# Patient Record
Sex: MALE | Race: WHITE | Employment: UNEMPLOYED | URBAN - METROPOLITAN AREA
[De-identification: names, ages, dates, MRNs, and addresses within clinical notes are randomized per-mention and may not be internally consistent; named-entity substitution may affect disease eponyms.]

---

## 2021-05-20 ENCOUNTER — OFFICE VISIT (OUTPATIENT)
Dept: FAMILY MEDICINE CLINIC | Age: 61
End: 2021-05-20
Payer: MEDICARE

## 2021-05-20 VITALS
WEIGHT: 197 LBS | BODY MASS INDEX: 28.2 KG/M2 | HEIGHT: 70 IN | TEMPERATURE: 97.3 F | SYSTOLIC BLOOD PRESSURE: 138 MMHG | DIASTOLIC BLOOD PRESSURE: 79 MMHG

## 2021-05-20 DIAGNOSIS — Z23 NEED FOR 23-POLYVALENT PNEUMOCOCCAL POLYSACCHARIDE VACCINE: ICD-10-CM

## 2021-05-20 DIAGNOSIS — M54.12 CERVICAL RADICULOPATHY: ICD-10-CM

## 2021-05-20 DIAGNOSIS — Z13.29 THYROID DISORDER SCREENING: ICD-10-CM

## 2021-05-20 DIAGNOSIS — Z13.1 DIABETES MELLITUS SCREENING: ICD-10-CM

## 2021-05-20 DIAGNOSIS — Z11.3 ROUTINE SCREENING FOR STI (SEXUALLY TRANSMITTED INFECTION): ICD-10-CM

## 2021-05-20 DIAGNOSIS — Z11.59 NEED FOR HEPATITIS C SCREENING TEST: ICD-10-CM

## 2021-05-20 DIAGNOSIS — Z13.220 LIPID SCREENING: ICD-10-CM

## 2021-05-20 DIAGNOSIS — M54.2 NECK PAIN: ICD-10-CM

## 2021-05-20 DIAGNOSIS — Z13.0 SCREENING FOR BLOOD DISEASE: ICD-10-CM

## 2021-05-20 DIAGNOSIS — Z23 NEED FOR SHINGLES VACCINE: ICD-10-CM

## 2021-05-20 DIAGNOSIS — Z13.21 ENCOUNTER FOR VITAMIN DEFICIENCY SCREENING: ICD-10-CM

## 2021-05-20 DIAGNOSIS — Z11.4 ENCOUNTER FOR SCREENING FOR HUMAN IMMUNODEFICIENCY VIRUS (HIV): ICD-10-CM

## 2021-05-20 DIAGNOSIS — Z12.11 COLON CANCER SCREENING: ICD-10-CM

## 2021-05-20 DIAGNOSIS — J43.8 OTHER EMPHYSEMA (HCC): Primary | Chronic | ICD-10-CM

## 2021-05-20 DIAGNOSIS — F17.200 CURRENT SMOKER: ICD-10-CM

## 2021-05-20 PROBLEM — M16.12 PRIMARY OSTEOARTHRITIS OF LEFT HIP: Status: ACTIVE | Noted: 2017-06-22

## 2021-05-20 PROCEDURE — 3023F SPIROM DOC REV: CPT | Performed by: FAMILY MEDICINE

## 2021-05-20 PROCEDURE — 4004F PT TOBACCO SCREEN RCVD TLK: CPT | Performed by: FAMILY MEDICINE

## 2021-05-20 PROCEDURE — 3017F COLORECTAL CA SCREEN DOC REV: CPT | Performed by: FAMILY MEDICINE

## 2021-05-20 PROCEDURE — G8419 CALC BMI OUT NRM PARAM NOF/U: HCPCS | Performed by: FAMILY MEDICINE

## 2021-05-20 PROCEDURE — G8427 DOCREV CUR MEDS BY ELIG CLIN: HCPCS | Performed by: FAMILY MEDICINE

## 2021-05-20 PROCEDURE — 90471 IMMUNIZATION ADMIN: CPT | Performed by: FAMILY MEDICINE

## 2021-05-20 PROCEDURE — 99203 OFFICE O/P NEW LOW 30 MIN: CPT | Performed by: FAMILY MEDICINE

## 2021-05-20 PROCEDURE — G8926 SPIRO NO PERF OR DOC: HCPCS | Performed by: FAMILY MEDICINE

## 2021-05-20 PROCEDURE — 90732 PPSV23 VACC 2 YRS+ SUBQ/IM: CPT | Performed by: FAMILY MEDICINE

## 2021-05-20 RX ORDER — ASPIRIN 81 MG/1
81 TABLET ORAL DAILY
COMMUNITY

## 2021-05-20 RX ORDER — LORATADINE 10 MG/1
TABLET ORAL
COMMUNITY
Start: 2021-05-05 | End: 2021-06-30 | Stop reason: ALTCHOICE

## 2021-05-20 RX ORDER — ALBUTEROL SULFATE 90 UG/1
2 AEROSOL, METERED RESPIRATORY (INHALATION) 4 TIMES DAILY PRN
Qty: 1 INHALER | Refills: 0 | Status: SHIPPED | OUTPATIENT
Start: 2021-05-20 | End: 2021-12-20

## 2021-05-20 ASSESSMENT — ENCOUNTER SYMPTOMS
RESPIRATORY NEGATIVE: 1
GASTROINTESTINAL NEGATIVE: 1
EYES NEGATIVE: 1

## 2021-05-20 ASSESSMENT — PATIENT HEALTH QUESTIONNAIRE - PHQ9
SUM OF ALL RESPONSES TO PHQ QUESTIONS 1-9: 1
2. FEELING DOWN, DEPRESSED OR HOPELESS: 1
SUM OF ALL RESPONSES TO PHQ QUESTIONS 1-9: 1
SUM OF ALL RESPONSES TO PHQ QUESTIONS 1-9: 1

## 2021-05-20 NOTE — PROGRESS NOTES
[address] PRIMARY CARE  [address] [place] [address] [facility]  Dept: [phone]  Dept Fax: [phone]    Caro Nazario is a 64 y.o. male who is a New patient, who presents today for his medical conditions/complaints as noted below:  Chief Complaint   Patient presents with   [facility]     ct of lungs    Neck Pain     slept on it wrong back in september and still not healed properly    Other     wants to check skin on hand for melona     Other     allergy test     Other     pneumonia vaccine    Other     colonoscopy in 2015 [facility]          HPI:     The patient is a 21-year-old male with a past medical history significant for osteoarthritis who presents today to [facility]. Patient provides a history for himself. The patient does note multiple complaints today including neck pain, an episode of heartburn and dizziness, palpitations. About 1 month ago the patient had not eaten all day, around 11 PM the patient had dranken 6 beers about 2 hours prior. The patient was making pizza and drinking still. The patient felt like his legs were shutting down standing there. He went to go to his bed and sat on the edge. He fell forward onto his face and nose. About 1 hour later while he was in bed he had significant heartburn (he had been eating pepper and garlic salt while preparing the pizza). The patient developed sweating while he was laying in bed as well. This has not reoccurred. He has drank an occasional beer since that time and did not have the same thing occur. The patient has occasional heart palpitations. He relates this to caffeine a lot. He drinks 12 cups of coffee/day. The patient had symptoms similar to Sandi in 10/2019. He had a headache, hoarseness. The patient does complain of neck pain that radiates down to the left hand. This began after he slept on it and woke up with pain.   This has been going on for at least a month likely a couple months. The symptoms have not been improving much. He has tried over-the-counter medications with no relief in the symptoms. The patient has a history of emphysema. He is a smoker. Neck Pain   This is a chronic problem. The current episode started more than 1 month ago. The problem occurs intermittently. The problem has been waxing and waning. The pain is associated with a sleep position. The pain is present in the left side and midline. The quality of the pain is described as aching. The pain is mild. Associated symptoms include headaches and numbness. Pertinent negatives include no chest pain, fever, leg pain, pain with swallowing, paresis, photophobia, syncope, tingling, trouble swallowing, visual change, weakness or weight loss. He has tried acetaminophen, bed rest, ice and NSAIDs for the symptoms. The treatment provided no relief. No results found for: LABA1C          ( goal A1Cis < 7)   No results found for: LABMICR  LDL Cholesterol (mg/dL)   Date Value   05/21/2021 148 (H)       (goal LDL is <100)   AST (U/L)   Date Value   05/21/2021 21     ALT (U/L)   Date Value   05/21/2021 23     BUN (mg/dL)   Date Value   05/21/2021 12     BP Readings from Last 3 Encounters:   05/20/21 138/79          (goal 120/80)    Past Medical History:   Diagnosis Date    Primary osteoarthritis of both hips     Seasonal allergies       History reviewed. No pertinent surgical history.     Family History   Problem Relation Age of Onset    Coronary Art Dis Mother     Other Mother         ulcers    Dementia Mother     Heart Attack Father     Diabetes Brother     Colon Cancer Neg Hx     Breast Cancer Neg Hx     Prostate Cancer Neg Hx        Social History     Tobacco Use    Smoking status: Current Every Day Smoker     Packs/day: 1.00     Years: 44.00     Pack years: 44.00     Types: Cigarettes     Start date: 1/1/1976    Smokeless tobacco: Never Used   Substance Use Topics    Alcohol use: Not diaphoretic. HENT:      Head: Normocephalic and atraumatic. Right Ear: Tympanic membrane, ear canal and external ear normal.      Left Ear: Tympanic membrane, ear canal and external ear normal.      Nose: Nose normal.      Mouth/Throat:      Mouth: Mucous membranes are moist.      Dentition: Abnormal dentition. Dental caries present. Eyes:      Extraocular Movements: Extraocular movements intact. Conjunctiva/sclera: Conjunctivae normal.      Pupils: Pupils are equal, round, and reactive to light. Cardiovascular:      Rate and Rhythm: Normal rate and regular rhythm. Pulses: Normal pulses. Heart sounds: Normal heart sounds. No murmur heard. Pulmonary:      Effort: Pulmonary effort is normal.      Breath sounds: Normal breath sounds. Abdominal:      General: Abdomen is flat. Bowel sounds are normal.      Palpations: Abdomen is soft. Musculoskeletal:         General: Normal range of motion. Cervical back: Normal range of motion and neck supple. No swelling, deformity or tenderness. Pain with movement present. Normal range of motion. Skin:     Capillary Refill: Capillary refill takes less than 2 seconds. Neurological:      General: No focal deficit present. Mental Status: He is alert and oriented to person, place, and time. Cranial Nerves: No cranial nerve deficit. Psychiatric:         Attention and Perception: Attention normal.         Mood and Affect: Mood and affect normal.         Speech: Speech normal.         Behavior: Behavior normal.         Thought Content: Thought content normal.         Judgment: Judgment normal.       /79 (Site: Right Upper Arm, Position: Sitting, Cuff Size: Large Adult)   Temp 97.3 °F (36.3 °C) (Temporal)   Ht 5' 9.5\" (1.765 m)   Wt 197 lb (89.4 kg)   BMI 28.67 kg/m²     Assessment:       Diagnosis Orders   1. Other emphysema (HCC)  albuterol sulfate HFA (VENTOLIN HFA) 108 (90 Base) MCG/ACT inhaler   2.  Cervical radiculopathy [facility]   3. Current smoker  CT LUNG SCREENING    albuterol sulfate HFA (VENTOLIN HFA) 108 (90 Base) MCG/ACT inhaler   4. Neck pain  XR CERVICAL SPINE (4-5 VIEWS)   5. Encounter for screening for human immunodeficiency virus (HIV)  HIV Screen   6. Need for hepatitis C screening test  Hepatitis C Antibody   7. Lipid screening  Lipid, Fasting   8. Diabetes mellitus screening  Comprehensive Metabolic Panel, Fasting   9. Thyroid disorder screening  TSH With Reflex Ft4   10. Encounter for vitamin deficiency screening  Vitamin D 25 Hydroxy   11. Screening for blood disease  CBC Auto Differential   12. Routine screening for STI (sexually transmitted infection)  Chlamydia/GC DNA, Urine    T. Pallidum Ab   13. Need for shingles vaccine  zoster recombinant adjuvanted vaccine (SHINGRIX) 50 MCG/0.5ML SUSR injection   14. Need for 23-polyvalent pneumococcal polysaccharide vaccine  PNEUMOVAX 23 subcutaneous/IM (Pneumococcal polysaccharide vaccine 23-valent >= 1yo)   15. Colon cancer screening  [facility] Screening Colonoscopy             Plan:    Emphysemapatient to use albuterol as needed. Spirometry is needed. Will order CT lung screening as well. Patient does note that he had spirometry done at [facility] a few years back, we will attempt to retrieve this. Pending the results of the spirometry we may need to add maintenance inhalers. Discussed with patient importance of smoking cessation. Cervical radiculopathy/neck painpatient to pursue physical therapy, take over-the-counter medications such as Tylenol ibuprofen and get imaging of the neck. If symptoms persist past 4 weeks to 6 weeks we will pursue MRI of the neck to see if there is any nerve impingement or disc disease. Patient encouraged to notify us in 4 to 6 weeks his progress. Current smokerdiscussed with patient importance of smoking cessation. At this time he is not able to quit smoking due to stress.   We will update his Pneumovax, check a CT lung screening and continue with education at each visit. Of note the patient had multiple sexual encounters and states he had around 147 partners. Due to this I would like to have the patient get STI screening including syphilis gonorrhea chlamydia and hepatitis. Return in about 4 weeks (around 6/17/2021) for follow up on testing. Orders Placed This Encounter   Procedures    Chlamydia/GC DNA, Urine     Standing Status:   Future     Standing Expiration Date:   5/20/2022    CT LUNG SCREENING     Standing Status:   Future     Standing Expiration Date:   8/20/2021     Order Specific Question:   Is there documentation of shared decision making? Answer:   Yes     Order Specific Question:   Does the patient show any signs or symptoms of lung cancer? Answer:   No     Order Specific Question:   Is this the first (baseline) CT or an annual exam?     Answer:   Baseline [1]     Order Specific Question:   Is this a low dose CT or a routine CT? Answer:   Low Dose CT [1]     Order Specific Question:   Smoking Status? Answer:   Current Every Day Smoker [1]     Order Specific Question:   Smoking packs per day? Answer:   1     Order Specific Question:   Years smoking?      Answer:   44    XR CERVICAL SPINE (4-5 VIEWS)     Standing Status:   Future     Standing Expiration Date:   5/20/2022     Order Specific Question:   Reason for exam:     Answer:   neck pain    PNEUMOVAX 23 subcutaneous/IM (Pneumococcal polysaccharide vaccine 23-valent >= 1yo)    Hepatitis C Antibody     Standing Status:   Future     Number of Occurrences:   1     Standing Expiration Date:   5/19/2022    HIV Screen     Standing Status:   Future     Number of Occurrences:   1     Standing Expiration Date:   5/19/2022    Lipid, Fasting     Standing Status:   Future     Number of Occurrences:   1     Standing Expiration Date:   5/20/2022    Comprehensive Metabolic Panel, Fasting     Standing Status:   Future     Number of Occurrences:   1     Standing Expiration Date:   5/20/2022    TSH With Reflex Ft4     Standing Status:   Future     Number of Occurrences:   1     Standing Expiration Date:   5/20/2022    Vitamin D 25 Hydroxy     Standing Status:   Future     Number of Occurrences:   1     Standing Expiration Date:   5/20/2022    CBC Auto Differential     Standing Status:   Future     Number of Occurrences:   1     Standing Expiration Date:   5/20/2022    T. Pallidum Ab     Standing Status:   Future     Number of Occurrences:   1     Standing Expiration Date:   5/20/2022   [facility] Screening Colonoscopy     Referral Priority:   Routine     Referral Type: Other     Referral Reason:   Specialty Services Required     Number of Visits Requested:   [facility]     Referral Priority:   Routine     Referral Type:   Eval and Treat     Referral Reason:   Specialty Services Required     Requested Specialty:   Physical Therapy     Number of Visits Requested:   1     Orders Placed This Encounter   Medications    zoster recombinant adjuvanted vaccine (SHINGRIX) 50 MCG/0.5ML SUSR injection     Sig: Inject 0.5 mLs into the muscle once for 1 dose     Dispense:  0.5 mL     Refill:  0    albuterol sulfate HFA (VENTOLIN HFA) 108 (90 Base) MCG/ACT inhaler     Sig: Inhale 2 puffs into the lungs 4 times daily as needed for Wheezing     Dispense:  1 Inhaler     Refill:  0       Patient given educational materials - see patient instructions. Discussed use, benefit, and side effects of prescribed medications. All patientquestions answered. Pt voiced understanding. Reviewed health maintenance. Instructedto continue current medications, diet and exercise. Patient agreed with treatmentplan. Follow up as directed.      Electronically signed by Antonio Deluca MD on 5/24/2021 at 7:08 AM

## 2021-05-21 ENCOUNTER — HOSPITAL ENCOUNTER (OUTPATIENT)
Age: 61
Setting detail: SPECIMEN
Discharge: HOME | End: 2021-05-21
Payer: MEDICARE

## 2021-05-21 DIAGNOSIS — Z11.59 NEED FOR HEPATITIS C SCREENING TEST: ICD-10-CM

## 2021-05-21 DIAGNOSIS — Z13.0 SCREENING FOR BLOOD DISEASE: ICD-10-CM

## 2021-05-21 DIAGNOSIS — Z13.29 THYROID DISORDER SCREENING: ICD-10-CM

## 2021-05-21 DIAGNOSIS — Z13.21 ENCOUNTER FOR VITAMIN DEFICIENCY SCREENING: ICD-10-CM

## 2021-05-21 DIAGNOSIS — Z11.3 ROUTINE SCREENING FOR STI (SEXUALLY TRANSMITTED INFECTION): ICD-10-CM

## 2021-05-21 DIAGNOSIS — Z13.1 DIABETES MELLITUS SCREENING: ICD-10-CM

## 2021-05-21 DIAGNOSIS — Z11.4 ENCOUNTER FOR SCREENING FOR HUMAN IMMUNODEFICIENCY VIRUS (HIV): ICD-10-CM

## 2021-05-21 DIAGNOSIS — Z13.220 LIPID SCREENING: ICD-10-CM

## 2021-05-21 LAB
ABSOLUTE EOS #: 0.39 K/UL (ref 0–0.44)
ABSOLUTE IMMATURE GRANULOCYTE: <0.03 K/UL (ref 0–0.3)
ABSOLUTE LYMPH #: 2.86 K/UL (ref 1.1–3.7)
ABSOLUTE MONO #: 0.68 K/UL (ref 0.1–1.2)
ALBUMIN SERPL-MCNC: 4.5 G/DL (ref 3.5–5.2)
ALBUMIN/GLOBULIN RATIO: 1.7 (ref 1–2.5)
ALP BLD-CCNC: 61 U/L (ref 40–129)
ALT SERPL-CCNC: 23 U/L (ref 5–41)
ANION GAP SERPL CALCULATED.3IONS-SCNC: 11 MMOL/L (ref 9–17)
AST SERPL-CCNC: 21 U/L
BASOPHILS # BLD: 1 % (ref 0–2)
BASOPHILS ABSOLUTE: 0.06 K/UL (ref 0–0.2)
BILIRUB SERPL-MCNC: 0.42 MG/DL (ref 0.3–1.2)
BUN BLDV-MCNC: 12 MG/DL (ref 6–20)
BUN/CREAT BLD: ABNORMAL (ref 9–20)
CALCIUM SERPL-MCNC: 9.3 MG/DL (ref 8.6–10.4)
CHLORIDE BLD-SCNC: 103 MMOL/L (ref 98–107)
CHOLESTEROL, FASTING: 221 MG/DL
CHOLESTEROL/HDL RATIO: 5.3
CO2: 24 MMOL/L (ref 20–31)
CREAT SERPL-MCNC: 0.9 MG/DL (ref 0.7–1.2)
DIFFERENTIAL TYPE: ABNORMAL
EOSINOPHILS RELATIVE PERCENT: 4 % (ref 1–4)
GFR AFRICAN AMERICAN: >60 ML/MIN
GFR NON-AFRICAN AMERICAN: >60 ML/MIN
GFR SERPL CREATININE-BSD FRML MDRD: ABNORMAL ML/MIN/{1.73_M2}
GFR SERPL CREATININE-BSD FRML MDRD: ABNORMAL ML/MIN/{1.73_M2}
GLUCOSE FASTING: 135 MG/DL (ref 70–99)
HCT VFR BLD CALC: 53 % (ref 40.7–50.3)
HDLC SERPL-MCNC: 42 MG/DL
HEMOGLOBIN: 16.3 G/DL (ref 13–17)
HEPATITIS C ANTIBODY: REACTIVE
HIV AG/AB: NONREACTIVE
IMMATURE GRANULOCYTES: 0 %
LDL CHOLESTEROL: 148 MG/DL (ref 0–130)
LYMPHOCYTES # BLD: 29 % (ref 24–43)
MCH RBC QN AUTO: 28.6 PG (ref 25.2–33.5)
MCHC RBC AUTO-ENTMCNC: 30.8 G/DL (ref 28.4–34.8)
MCV RBC AUTO: 93 FL (ref 82.6–102.9)
MONOCYTES # BLD: 7 % (ref 3–12)
NRBC AUTOMATED: 0 PER 100 WBC
PDW BLD-RTO: 14.6 % (ref 11.8–14.4)
PLATELET # BLD: 327 K/UL (ref 138–453)
PLATELET ESTIMATE: ABNORMAL
PMV BLD AUTO: 10.8 FL (ref 8.1–13.5)
POTASSIUM SERPL-SCNC: 4.1 MMOL/L (ref 3.7–5.3)
RBC # BLD: 5.7 M/UL (ref 4.21–5.77)
RBC # BLD: ABNORMAL 10*6/UL
SEG NEUTROPHILS: 59 % (ref 36–65)
SEGMENTED NEUTROPHILS ABSOLUTE COUNT: 5.81 K/UL (ref 1.5–8.1)
SODIUM BLD-SCNC: 138 MMOL/L (ref 135–144)
T. PALLIDUM, IGG: NONREACTIVE
TOTAL PROTEIN: 7.2 G/DL (ref 6.4–8.3)
TRIGLYCERIDE, FASTING: 156 MG/DL
TSH SERPL DL<=0.05 MIU/L-ACNC: 1.04 MIU/L (ref 0.3–5)
VITAMIN D 25-HYDROXY: 19.3 NG/ML (ref 30–100)
VLDLC SERPL CALC-MCNC: ABNORMAL MG/DL (ref 1–30)
WBC # BLD: 9.8 K/UL (ref 3.5–11.3)
WBC # BLD: ABNORMAL 10*3/UL

## 2021-05-24 ENCOUNTER — HOSPITAL ENCOUNTER (OUTPATIENT)
Dept: GENERAL RADIOLOGY | Facility: CLINIC | Age: 61
Discharge: HOME | End: 2021-05-26
Payer: MEDICARE

## 2021-05-24 ENCOUNTER — HOSPITAL ENCOUNTER (OUTPATIENT)
Facility: CLINIC | Age: 61
Discharge: HOME | End: 2021-05-26
Payer: MEDICARE

## 2021-05-24 ENCOUNTER — HOSPITAL ENCOUNTER (OUTPATIENT)
Age: 61
Setting detail: SPECIMEN
Discharge: HOME | End: 2021-05-24
Payer: MEDICARE

## 2021-05-24 DIAGNOSIS — Z11.3 ROUTINE SCREENING FOR STI (SEXUALLY TRANSMITTED INFECTION): ICD-10-CM

## 2021-05-24 DIAGNOSIS — M54.2 NECK PAIN: ICD-10-CM

## 2021-05-24 PROCEDURE — 72050 X-RAY EXAM NECK SPINE 4/5VWS: CPT

## 2021-05-24 ASSESSMENT — ENCOUNTER SYMPTOMS
PHOTOPHOBIA: 0
TROUBLE SWALLOWING: 0
VISUAL CHANGE: 0

## 2021-05-25 LAB
C. TRACHOMATIS DNA ,URINE: NEGATIVE
N. GONORRHOEAE DNA, URINE: NEGATIVE
SPECIMEN DESCRIPTION: NORMAL

## 2021-05-26 ENCOUNTER — OFFICE VISIT (OUTPATIENT)
Dept: FAMILY MEDICINE CLINIC | Age: 61
End: 2021-05-26
Payer: MEDICARE

## 2021-05-26 ENCOUNTER — TELEPHONE (OUTPATIENT)
Dept: ONCOLOGY | Age: 61
End: 2021-05-26

## 2021-05-26 ENCOUNTER — HOSPITAL ENCOUNTER (OUTPATIENT)
Age: 61
Discharge: HOME | End: 2021-05-26
Payer: MEDICARE

## 2021-05-26 VITALS
DIASTOLIC BLOOD PRESSURE: 82 MMHG | BODY MASS INDEX: 29.33 KG/M2 | WEIGHT: 198 LBS | SYSTOLIC BLOOD PRESSURE: 130 MMHG | OXYGEN SATURATION: 96 % | TEMPERATURE: 97.3 F | HEIGHT: 69 IN | HEART RATE: 92 BPM

## 2021-05-26 DIAGNOSIS — R73.9 HYPERGLYCEMIA: ICD-10-CM

## 2021-05-26 DIAGNOSIS — R76.8 HEPATITIS C ANTIBODY POSITIVE IN BLOOD: Primary | ICD-10-CM

## 2021-05-26 DIAGNOSIS — R76.8 HEPATITIS C ANTIBODY POSITIVE IN BLOOD: ICD-10-CM

## 2021-05-26 DIAGNOSIS — E78.00 ELEVATED LDL CHOLESTEROL LEVEL: ICD-10-CM

## 2021-05-26 DIAGNOSIS — E55.9 VITAMIN D DEFICIENCY: ICD-10-CM

## 2021-05-26 LAB
HAV IGM SER IA-ACNC: NONREACTIVE
HEPATITIS B CORE IGM ANTIBODY: NONREACTIVE
HEPATITIS B SURFACE ANTIGEN: NONREACTIVE
HEPATITIS C ANTIBODY: REACTIVE

## 2021-05-26 PROCEDURE — G8427 DOCREV CUR MEDS BY ELIG CLIN: HCPCS | Performed by: FAMILY MEDICINE

## 2021-05-26 PROCEDURE — 99214 OFFICE O/P EST MOD 30 MIN: CPT | Performed by: FAMILY MEDICINE

## 2021-05-26 PROCEDURE — G8419 CALC BMI OUT NRM PARAM NOF/U: HCPCS | Performed by: FAMILY MEDICINE

## 2021-05-26 PROCEDURE — 83036 HEMOGLOBIN GLYCOSYLATED A1C: CPT

## 2021-05-26 PROCEDURE — 36415 COLL VENOUS BLD VENIPUNCTURE: CPT

## 2021-05-26 PROCEDURE — 80074 ACUTE HEPATITIS PANEL: CPT

## 2021-05-26 PROCEDURE — 4004F PT TOBACCO SCREEN RCVD TLK: CPT | Performed by: FAMILY MEDICINE

## 2021-05-26 PROCEDURE — 3017F COLORECTAL CA SCREEN DOC REV: CPT | Performed by: FAMILY MEDICINE

## 2021-05-26 PROCEDURE — 87522 HEPATITIS C REVRS TRNSCRPJ: CPT

## 2021-05-26 RX ORDER — ATORVASTATIN CALCIUM 20 MG/1
20 TABLET, FILM COATED ORAL DAILY
Qty: 30 TABLET | Refills: 5 | Status: SHIPPED | OUTPATIENT
Start: 2021-05-26 | End: 2021-11-17

## 2021-05-26 RX ORDER — ERGOCALCIFEROL 1.25 MG/1
50000 CAPSULE ORAL WEEKLY
Qty: 12 CAPSULE | Refills: 1 | Status: SHIPPED | OUTPATIENT
Start: 2021-05-26 | End: 2021-11-17

## 2021-05-26 SDOH — ECONOMIC STABILITY: FOOD INSECURITY: WITHIN THE PAST 12 MONTHS, YOU WORRIED THAT YOUR FOOD WOULD RUN OUT BEFORE YOU GOT MONEY TO BUY MORE.: NEVER TRUE

## 2021-05-26 SDOH — ECONOMIC STABILITY: FOOD INSECURITY: WITHIN THE PAST 12 MONTHS, THE FOOD YOU BOUGHT JUST DIDN'T LAST AND YOU DIDN'T HAVE MONEY TO GET MORE.: NEVER TRUE

## 2021-05-26 ASSESSMENT — SOCIAL DETERMINANTS OF HEALTH (SDOH): HOW HARD IS IT FOR YOU TO PAY FOR THE VERY BASICS LIKE FOOD, HOUSING, MEDICAL CARE, AND HEATING?: NOT HARD AT ALL

## 2021-05-26 NOTE — PROGRESS NOTES
Age of Onset    Coronary Art Dis Mother     Other Mother         ulcers    Dementia Mother     Heart Attack Father     Diabetes Brother     Colon Cancer Neg Hx     Breast Cancer Neg Hx     Prostate Cancer Neg Hx        Social History     Tobacco Use    Smoking status: Current Every Day Smoker     Packs/day: 1.00     Years: 44.00     Pack years: 44.00     Types: Cigarettes     Start date: 1/1/1976    Smokeless tobacco: Never Used   Substance Use Topics    Alcohol use: Not Currently     Comment: occassionly only drinnks every few months      Current Outpatient Medications   Medication Sig Dispense Refill    vitamin D (ERGOCALCIFEROL) 1.25 MG (36071 UT) CAPS capsule Take 1 capsule by mouth once a week 12 capsule 1    atorvastatin (LIPITOR) 20 MG tablet Take 1 tablet by mouth daily 30 tablet 5    loratadine (CLARITIN) 10 MG tablet       aspirin 81 MG EC tablet Take 81 mg by mouth daily      albuterol sulfate HFA (VENTOLIN HFA) 108 (90 Base) MCG/ACT inhaler Inhale 2 puffs into the lungs 4 times daily as needed for Wheezing 1 Inhaler 0     No current facility-administered medications for this visit. No Known Allergies    Health Maintenance   Topic Date Due    COVID-19 Vaccine (1) Never done    Shingles Vaccine (1 of 2) Never done    Colon cancer screen colonoscopy  Never done    Low dose CT lung screening  Never done    Flu vaccine (Season Ended) 09/01/2021    Lipid screen  05/21/2022    A1C test (Diabetic or Prediabetic)  05/26/2022    DTaP/Tdap/Td vaccine (2 - Td) 01/01/2026    Pneumococcal 0-64 years Vaccine (2 of 2) 06/09/2027    Hepatitis C screen  Completed    HIV screen  Completed    Hepatitis A vaccine  Aged Out    Hepatitis B vaccine  Aged Out    Hib vaccine  Aged Out    Meningococcal (ACWY) vaccine  Aged Out       Subjective:     Review of Systems   Constitutional: Negative. HENT: Negative. Eyes: Negative. Respiratory: Negative. Cardiovascular: Negative. GI.  LFTs were essentially unremarkable and check this time. If confirmatory test is positive we will order an ultrasound of the liver. Vitamin D deficiencypatient to start vitamin D supplement and recheck vitamin D level in 6 months. Hyperglycemiapatient to get an A1c when he goes to get the other labs drawn. LDL elevationpatient's risks are high including his history of smoking, and LDL level. I discussed with him starting statin. We will start him on a statin today and recheck LFTs in 3 to 6 months. We will also recheck cholesterol in 3 months. Return in about 3 months (around 8/26/2021). Orders Placed This Encounter   Procedures    Hemoglobin A1C     Standing Status:   Future     Number of Occurrences:   1     Standing Expiration Date:   5/26/2022    Hepatitis C RNA, PCR     Standing Status:   Future     Number of Occurrences:   1     Standing Expiration Date:   5/26/2022    Hepatitis Panel, Acute     Standing Status:   Future     Number of Occurrences:   1     Standing Expiration Date:   5/26/2022   [facility], [address]     Referral Priority:   Routine     Referral Type:   Eval and Treat     Referral Reason:   Specialty Services Required     Requested Specialty:   Gastroenterology     Number of Visits Requested:   1     Orders Placed This Encounter   Medications    vitamin D (ERGOCALCIFEROL) 1.25 MG (47171 UT) CAPS capsule     Sig: Take 1 capsule by mouth once a week     Dispense:  12 capsule     Refill:  1    atorvastatin (LIPITOR) 20 MG tablet     Sig: Take 1 tablet by mouth daily     Dispense:  30 tablet     Refill:  5       Patient given educational materials - see patient instructions. Discussed use, benefit, and side effects of prescribed medications. All patientquestions answered. Pt voiced understanding. Reviewed health maintenance. Instructedto continue current medications, diet and exercise. Patient agreed with treatmentplan.  Follow up as directed.      Electronically signed by Evita Galvan MD on 5/31/2021 at 12:04 PM

## 2021-05-27 ENCOUNTER — HOSPITAL ENCOUNTER (OUTPATIENT)
Dept: PHYSICAL THERAPY | Facility: CLINIC | Age: 61
Setting detail: THERAPIES SERIES
Discharge: HOME | End: 2021-05-27
Payer: MEDICARE

## 2021-05-27 DIAGNOSIS — R73.03 PREDIABETES: Primary | ICD-10-CM

## 2021-05-27 LAB
DIRECT EXAM: NORMAL
ESTIMATED AVERAGE GLUCOSE: 134 MG/DL
HBA1C MFR BLD: 6.3 % (ref 4–6)
Lab: NORMAL
SPECIMEN DESCRIPTION: NORMAL

## 2021-05-27 PROCEDURE — 97530 THERAPEUTIC ACTIVITIES: CPT

## 2021-05-27 PROCEDURE — 97161 PT EVAL LOW COMPLEX 20 MIN: CPT

## 2021-05-27 PROCEDURE — 97140 MANUAL THERAPY 1/> REGIONS: CPT

## 2021-05-27 NOTE — CONSULTS
[] [facility]) - [facility] &  Therapy  [address]  [phone]  F: [phone] [] [address]  [facility]   [address]  P: [phone]  F: [phone] [] [address] &  Therapy  [address]  P: [phone]  F: [phone] [x] [address]  P: [phone]  F: [phone] [] [address]  [facility]   [address]   [place]: [phone]  F: [phone]      Physical Therapy Spine Evaluation    Date:  2021  Patient: Gerhardt Auer  : 1960  MRN: [id number]  Physician: Dr. Keyona Weber: liudmila fuller (30 visits/yr)  Medical Diagnosis: Cervical radiculopathy  Rehab Codes: M54.2  Onset Date:   Next 's appt.:     Subjective:   CC:  Pins/needles (low grade)/  Burn that comes and goes. HPI: Occasional numbness . Decayed vertebrae which is aggravated by \"drinking beer  And sleeping wrong\". To have an MRI in 4-6 wks if no improvement. To have a lung CT but is not having any issues. Always pain in the neck and numbness on L side of neck. But frequency, duration and severity have been decreasing. Back in Sept of last year, he slept wrong and has taken several months to improve. When it happened, he was limited with all movement. 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 Saw Dr. Edgard Nieves for the first time last week.    Prior to that, it was ~3 yrs ago having a PCP  PMHx: [] Unremarkable [] Diabetes [x] HTN  [] Pacemaker   [] MI/Heart Problems [] Cancer [] Arthritis  [] Other:              [x] Refer to full medical chart  In EPIC       Comorbidities:   [] Obesity [] Dialysis  [] N/A   [x] Asthma/COPD [] Dementia [] Other:   [] Stroke [] Sleep apnea [] Independent  [] Assist      Gait Prior level of function Current level of function    [] Independent  [] Assist [] Independent  [] Assist   Device: [] Independent [] Independent    [] Straight Cane [] Quad cane [] Straight Cane [] Quad cane    [] Standard walker [] Rolling walker   [] 4 wheeled walker [] Standard walker [] Rolling walker   [] 4 wheeled walker    [] Wheelchair [] Wheelchair     Pain:  [x] Yes  [] No Location:  Pain Rating: (0-10 scale) 0/10  Pain altered Tx:  [] Yes  [] No  Action:    Symptoms:  [x] Improving [] Worsening [] Same  Better:  [] AM    [] PM    [] Sit    [] Rise/Sit    []Stand    [] Walk    [] Lying    [] Other:  Worse: [] AM    [] PM    [] Sit    [] Rise/Sit    []Stand    [] Walk    [] Lying    [] Bend                      [] Valsalva    [] Other:  Sleep: [] OK    [x] Disturbed side sleeper. 1 pillow but he feels that there is quite a pressure on his neck. He is trying to sleep on his back     Objective:      STRENGTH  ROM    Left Right Cervical    C5 Shld Abd 5 5 Flexion wnl   Shld Flexion 5 5 Extension discomfort   Shld IR 5 5 Rotation L stif R stiff   Shld ER 5 5 Sidebend Lstiff R stiff   C6 Elb Flex 5 5 Retraction    C7 Elb Ext 5 5     C8 EPL 5 5     T1 Fing Abd 5 5        UE wnl wnl                                                           Otto shoulder flexion were wnl for full flexion and abd, but increased resistance to full range. TESTS (+/-) LEFT RIGHT Not Tested   SLR [] sit [] supine   []   Hamstring (SLR)   []   SKTC   []   DKTC   []   Slump/Dural   []   SI JT   []   ANDREA   []   Joint Mobility   []   Cerv. Comp   []   Cerv. Distraction   []   Cerv. Alar/Transverse   []   Vertebral Artery   []   Adsons   []   Ardath Patricia   []   Reji Tests ? Pain ?  Pain No Change Not Tested   RFIS [] [] [] []   JUDY [] [] [] []   RFIL [] [] [] []   REIL [] [] [] []   Rep Prot [] [] [] []   Rep Retract [] [] [] []       OBSERVATION No Deficit Deficit Not Tested Comments   Posture Forward Head [] [x] []    Rounded Shoulders [] [x] []    Kyphosis [] [x] []    Lordosis [] [] []    Lateral Shift [] [] []    Scoliosis [] [] []    Iliac Crest [] [] []    PSIS [] [] []    ASIS [] [] []    Genu Valgus [] [] []    Genu Varus [] [] []    Genu Recurvatum [] [] []    Pronation [] [] []    Supination [] [] []    Leg Length Discrp [] [] []    Slumped Sitting [] [] []    Palpation [] [] []    Sensation [] [] []    Edema [] [] []    Neurological [] [] []        Functional Test: NDI Score: not completed  functionally impaired     Comments:    Assessment:  Patient would benefit from skilled physical therapy services in order to: decrease pins/needles feeling on the L lateral side of his neck    Problems:    [x] ? Pain:  [x] ? ROM:  [] ? Strength:  [x] ? Function:  [] Other:      STG: (to be met in 6 treatments)  1. ? Pain: in neck  2. ? ROM: to minimal restriction with SB and rot  3. ? Function: able to sleep on his L side with decreased discomfort. 4. Patient to be independent with home exercise program as demonstrated by performance with correct form without cues. LTG: (to be met in 12 treatments)  1. Minimal to no paresthesias in L lateral neck  2. Independent with proper computer workstation setup      Patient goals:     Rehab Potential:  [x] Good  [] Fair  [] Poor   Suggested Professional Referral:  [x] No  [] Yes:  Barriers to Goal Achievement:  [x] No  [] Yes:  Domestic Concerns:  [x] No  [] Yes:    Pt. Education:  [x] Plans/Goals, Risks/Benefits discussed  [x] Home exercise program: sleeping posture on his side and supine with a towel roll in pillow case  Method of Education: [x] Verbal  [] Demo  [] Written  Comprehension of Education:  [x] Verbalizes understanding. [] Demonstrates understanding. [] Needs Review. [] Demonstrates/verbalizes understanding of HEP/Ed previously given.     Treatment Plan:  [x] Therapeutic Exercise   13144    [] Therapeutic Activity  87848   [] Gait Training   35394 [x] Neuromuscular Re-education  [id number]   [x] Manual Therapy  39328   [x] Instruction in HEP        Frequency:  2 x/week for 12 visits    Todays Treatment:  Modalities: none  Precautions: standard  Exercises:  Exercise Reps/ Time Weight/ Level Issued for HEP Completed Comments   UBE *               Shoulder flex/abd 10 ea  x x    Shrugs *       UT stretch *       Lev scap stretch (*       C AROM *       TB rows/ext *                                                               Other:  Manual  1. STM to L scalene, lev scap    Therapeutic act:  1. Reviewed at length proper sleeping posture with 1-2 pillows and a towel roll in the pillow case. 2. Reviewed computer workstation posture. Specific Instructions for next treatment:  1. Continue with manual  2. Add UE ex as above      Evaluation Complexity:  History (Personal factors, comorbidities) [x] 0 [] 1-2 [] 3+   Exam (limitations, restrictions) [x] 1-2 [] 3 [] 4+   Clinical presentation (progression) [x] Stable [] Evolving  [] Unstable   Decision Making [x] Low [] Moderate [] High    [x] Low Complexity [] Moderate Complexity [] High Complexity       Treatment Charges: Mins Units   [x] Evaluation       [x]  Low       []  Moderate       []  High  1   []  Modalities     []  Ther Exercise     [x]  Manual Therapy 10 1   [x]  Ther Activities 20 1   []  Aquatics     []  Vasocompression     []  Other       TOTAL TREATMENT TIME: 55    Time in: 0563      Time out: [address]    Electronically signed by: Giovani Mir PT        Physician Signature:________________________________Date:__________________  By signing above or cosigning this note, I have reviewed this plan of care and certify a need for medically necessary rehabilitation services.      *PLEASE SIGN ABOVE AND FAX BACK ALL PAGES*

## 2021-05-31 ASSESSMENT — ENCOUNTER SYMPTOMS
RESPIRATORY NEGATIVE: 1
GASTROINTESTINAL NEGATIVE: 1
EYES NEGATIVE: 1

## 2021-06-01 ENCOUNTER — HOSPITAL ENCOUNTER (OUTPATIENT)
Dept: CT IMAGING | Facility: CLINIC | Age: 61
Discharge: HOME | End: 2021-06-03
Payer: MEDICARE

## 2021-06-01 ENCOUNTER — HOSPITAL ENCOUNTER (OUTPATIENT)
Dept: PHYSICAL THERAPY | Facility: CLINIC | Age: 61
Setting detail: THERAPIES SERIES
Discharge: HOME | End: 2021-06-01
Payer: MEDICARE

## 2021-06-01 DIAGNOSIS — F17.200 CURRENT SMOKER: ICD-10-CM

## 2021-06-01 PROCEDURE — 71271 CT THORAX LUNG CANCER SCR C-: CPT

## 2021-06-01 PROCEDURE — 97140 MANUAL THERAPY 1/> REGIONS: CPT

## 2021-06-01 PROCEDURE — 97110 THERAPEUTIC EXERCISES: CPT

## 2021-06-01 NOTE — FLOWSHEET NOTE
of motion. Added UE and neck strengthening activities to improve stability. Pt received manual to L scalene, lev scap and bilateral UT to decrease muscle tightness and pain. Pt noted improvement after treatmtent. [] No change. [] Other:  [x] Patient would continue to benefit from skilled physical therapy services in order to: decrease pins/needles feeling on the L lateral side of his neck      STG: (to be met in 6 treatments)  1. ? Pain: in neck  2. ? ROM: to minimal restriction with SB and rot  3. ? Function: able to sleep on his L side with decreased discomfort. 4. Patient to be independent with home exercise program as demonstrated by performance with correct form without cues. LTG: (to be met in 12 treatments)  1. Minimal to no paresthesias in L lateral neck  2. Independent with proper computer workstation setup          Pt. Education:  [x] Yes  [] No  [] Reviewed Prior HEP/Ed  Method of Education: [x] Verbal  [x] Demo  [] Written  Comprehension of Education:  [x] Verbalizes understanding. [x] Demonstrates understanding. [] Needs review. [] Demonstrates/verbalizes HEP/Ed previously given. Plan: [x] Continue current frequency toward long and short term goals.     [x] Specific Instructions for subsequent treatments: above      Time In: 0900            Time Out: 1610    Electronically signed by:  Fouzia Whittington PTA

## 2021-06-08 ENCOUNTER — HOSPITAL ENCOUNTER (OUTPATIENT)
Dept: PHYSICAL THERAPY | Facility: CLINIC | Age: 61
Setting detail: THERAPIES SERIES
Discharge: HOME | End: 2021-06-08
Payer: MEDICARE

## 2021-06-08 PROCEDURE — 97110 THERAPEUTIC EXERCISES: CPT

## 2021-06-08 PROCEDURE — 97140 MANUAL THERAPY 1/> REGIONS: CPT

## 2021-06-08 NOTE — FLOWSHEET NOTE
[x] [facility]  Outpatient Rehabilitation &  Therapy  [address]  P: [phone]  F: [phone] [] [address]  [address]: [phone]  F: [phone]      Physical Therapy Daily Treatment Note    Date:  2021  Patient Name:  Frieda Penaloza    :  1960  MRN: [id number]  Physician: Dr. Nevin Thompson: liudmila fuller (30 visits/yr)  Medical Diagnosis: Cervical radiculopathy Rehab Codes: M54.2  Onset Date:  ???                 Next 's appt.: TBD  Visit# / total visits: 3/12    Cancels/No Shows: 0/0    Subjective:    Pain:  [x] Yes  [] No Location: L side Neck Pain    Rating: (0-10 scale) 0-1/10  Pain altered Tx:  [] No  [] Yes  Action:  Comments: Pt reported cervical spinal pain which patient stated as normal but pt stated that the burning and numbness has been gone since the last visit. Objective:  Modalities:   Precautions:  Exercises:  Exercise Reps/ Time Weight/ Level Issued for HEP Completed Comments   UBE 2/2                       Shoulder flex/abd/scaption  10x ea   x      Shrugs 10x          UT stretch 2x15\" ea          Lev scap stretch 2x15\" ea          C AROM 10x ea          TB rows 2x15  blue        TB ext 2x15 blue        Supine on 1/2 foam roller        X                                                                           Other:    Manual  1. Sub occ release followed by gentle C traction   2. DI to L pec minor     Specific Instructions for next treatment:  1. Continue with manual  2. Neck and upper back strengthening activities     Treatment Charges: Mins Units   []  Modalities     [x]  Ther Exercise 23 2   [x]  Manual Therapy 15 1   []  Ther Activities     []  Aquatics     []  Vasocompression     []  Other     Total Treatment time 38 3       Assessment: [x] Progressing toward goals.  He remains tender on sub occ triangle, but less on the scalenes, lev scap, and UT. Issued HEP (supine on 1/2 noodle, pec stretch, retractions, C rotation, UT stretch)    [] No change. [] Other:  [x] Patient would continue to benefit from skilled physical therapy services in order to: decrease pins/needles feeling on the L lateral side of his neck      STG: (to be met in 6 treatments)  1. ? Pain: in neck  2. ? ROM: to minimal restriction with SB and rot  3. ? Function: able to sleep on his L side with decreased discomfort. 4. Patient to be independent with home exercise program as demonstrated by performance with correct form without cues. LTG: (to be met in 12 treatments)  1. Minimal to no paresthesias in L lateral neck  2. Independent with proper computer workstation setup          Pt. Education:  [x] Yes  [] No  [x] Reviewed Prior HEP/Ed  Method of Education: [] Verbal  [x] Demo  [x] Written  Comprehension of Education:  [x] Verbalizes understanding. [x] Demonstrates understanding. [] Needs review. [] Demonstrates/verbalizes HEP/Ed previously given. Plan: [x] Continue current frequency toward long and short term goals.     [x] Specific Instructions for subsequent treatments: above      Time In: 3453  Time Out: 1250    Electronically signed by:  Vanessa Weathers PT

## 2021-06-11 ENCOUNTER — HOSPITAL ENCOUNTER (OUTPATIENT)
Dept: PHYSICAL THERAPY | Facility: CLINIC | Age: 61
Setting detail: THERAPIES SERIES
Discharge: HOME | End: 2021-06-11
Payer: MEDICARE

## 2021-06-11 PROCEDURE — 97110 THERAPEUTIC EXERCISES: CPT

## 2021-06-11 PROCEDURE — 97140 MANUAL THERAPY 1/> REGIONS: CPT

## 2021-06-11 NOTE — FLOWSHEET NOTE
[x] [facility]  Outpatient Rehabilitation &  Therapy  [address]  P: [phone]  F: [phone] [] [address]  [facility]: [phone]  F: [phone]      Physical Therapy Daily Treatment Note    Date:  2021  Patient Name:  Pablito Wilcox    :  1960  MRN: [id number]  Physician: Dr. Troncoso Skill: liudmila fuller (30 visits/yr)  Medical Diagnosis: Cervical radiculopathy Rehab Codes: M54.2  Onset Date:  ???                 Next 's appt.: TBD  Visit# / total visits:     Cancels/No Shows: 0/0    Subjective:    Pain:  [x] Yes  [] No Location: L side Neck Pain    Rating: (0-10 scale) 0-1/10  Pain altered Tx:  [x] No  [] Yes  Action:  Comments: Pt reports his neck has felt stiff over the last few days. Pt denies pain and states that he rarley has numbness/tingling/burning in his neck anymore. Objective:  Modalities:   Precautions:  Exercises:  Exercise Reps/ Time Weight/ Level Issued for HEP Completed Comments   UBE 2/2  L1   x                  Shoulder flex/abd 2x10 ea   x x     Shrugs 10x     -     UT stretch 2x20\" ea     x     Lev scap stretch 2x15\" ea          C AROM 10x ea     x     TB rows 2x15  purple   x     TB ext 2x15 blue   x     Supine on 1/2 foam roller  1' ea      x                   Other:    Manual  1. Sub occ release followed by gentle C traction   2. DI to L pec minor- pt req to hold     Specific Instructions for next treatment:  1. Continue with manual  2. Neck and upper back strengthening activities     Treatment Charges: Mins Units   [x]  Modalities: MHP 10 N/C   [x]  Ther Exercise 30 2   [x]  Manual Therapy 15 1   []  Ther Activities     []  Aquatics     []  Vasocompression     []  Other     Total Treatment time 55 3       Assessment: [] Progressing toward goals. [x] No change. Initiated tx with SOR and manual cervical traction.  Pt states his neck still feels stiff after, therefore applied MHP in supine with cervical HP, decreased tension reported after. Pt then completed cervical AROM with fair sathya. Cont with UBE, scap strengthening and B shld AROM with good sathya. Pt req mod verbal and tactile cues to decr B UT activation with tband rows and B shld ext, fair carry through noted. Pt req cues to \"slow down\" with B shld AROM. Pt also req max cues with seated B UT and levator stretches. Ended with B pec stretch on 1/2 foam roller with sig tightness noted B. Good sathya to interventions this date, pt states that he \"feels great\" after tx. PTA educated pt on doing \"self-checks\" throughout the day to make sure he is relaxed and not in a B shld elevated position. [] Other:  [x] Patient would continue to benefit from skilled physical therapy services in order to: decrease pins/needles feeling on the L lateral side of his neck      STG: (to be met in 6 treatments)  1. ? Pain: in neck  2. ? ROM: to minimal restriction with SB and rot  3. ? Function: able to sleep on his L side with decreased discomfort. 4. Patient to be independent with home exercise program as demonstrated by performance with correct form without cues. LTG: (to be met in 12 treatments)  1. Minimal to no paresthesias in L lateral neck  2. Independent with proper computer workstation setup          Pt. Education:  [x] Yes  [] No  [x] Reviewed Prior HEP/Ed  Method of Education: [] Verbal  [x] Demo  [x] Written  Comprehension of Education:  [x] Verbalizes understanding. [x] Demonstrates understanding. [] Needs review. [] Demonstrates/verbalizes HEP/Ed previously given. Plan: [x] Continue current frequency toward long and short term goals.     [x] Specific Instructions for subsequent treatments: above      Time In: 1:00pm  Time Out: 1:57pm    Electronically signed by:  Dora Rogers PTA

## 2021-06-15 ENCOUNTER — OFFICE VISIT (OUTPATIENT)
Dept: GASTROENTEROLOGY | Age: 61
End: 2021-06-15
Payer: MEDICARE

## 2021-06-15 ENCOUNTER — HOSPITAL ENCOUNTER (OUTPATIENT)
Dept: PHYSICAL THERAPY | Facility: CLINIC | Age: 61
Setting detail: THERAPIES SERIES
Discharge: HOME | End: 2021-06-15
Payer: MEDICARE

## 2021-06-15 VITALS — WEIGHT: 197 LBS | DIASTOLIC BLOOD PRESSURE: 86 MMHG | BODY MASS INDEX: 29.51 KG/M2 | SYSTOLIC BLOOD PRESSURE: 132 MMHG

## 2021-06-15 DIAGNOSIS — K57.92 DIVERTICULITIS: ICD-10-CM

## 2021-06-15 DIAGNOSIS — R76.8 HEPATITIS C ANTIBODY POSITIVE IN BLOOD: ICD-10-CM

## 2021-06-15 DIAGNOSIS — Z12.11 COLON CANCER SCREENING: Primary | ICD-10-CM

## 2021-06-15 PROCEDURE — 3017F COLORECTAL CA SCREEN DOC REV: CPT | Performed by: INTERNAL MEDICINE

## 2021-06-15 PROCEDURE — G8417 CALC BMI ABV UP PARAM F/U: HCPCS | Performed by: INTERNAL MEDICINE

## 2021-06-15 PROCEDURE — 4004F PT TOBACCO SCREEN RCVD TLK: CPT | Performed by: INTERNAL MEDICINE

## 2021-06-15 PROCEDURE — 99203 OFFICE O/P NEW LOW 30 MIN: CPT | Performed by: INTERNAL MEDICINE

## 2021-06-15 PROCEDURE — 97110 THERAPEUTIC EXERCISES: CPT

## 2021-06-15 PROCEDURE — G8427 DOCREV CUR MEDS BY ELIG CLIN: HCPCS | Performed by: INTERNAL MEDICINE

## 2021-06-15 ASSESSMENT — ENCOUNTER SYMPTOMS
BLOOD IN STOOL: 1
ALLERGIC/IMMUNOLOGIC NEGATIVE: 1
EYES NEGATIVE: 1
RESPIRATORY NEGATIVE: 1

## 2021-06-15 NOTE — PROGRESS NOTES
Reason for Referral: Hepatitis C positive results      Kashif Mcfarland MD  [address] [place] [address],  [address]    Chief Complaint   Patient presents with   [facility] New Patient     referred for hepatitis C and colonoscopy screening.  Colon Cancer Screening     Patient states his last colonoscopy was 5 years ago @ [facility]    Diverticulitis     Patient states having 2 occurances in the past year of bleeding when having a flare. HISTORY OF PRESENT ILLNESS: Mr.Dennis P Leopoldo Holly is a 63 y.o. male with a past history remarkable for arthritis of hips, emphysema, COPD, referred for evaluation of chronic HCV and colonoscopy evaluation. Smoker: active smoker, 1 ppd x 40 yrs. Drinking history: heavy in the past <9231   Illicit drugs: Marijuana  Abdominal surgeries: None  Prior Colonoscopy: 5 yrs ago, Jan. 2016, [facility]. Prior EGD: None   FH of GI issues: Father- DM, Mother-CAD      Past Medical,Family, and Social History reviewed and does contribute to the patient presentingcondition. Patient's PMH/PSH,SH,PSYCH Hx, MEDs, ALLERGIES, and ROS were all reviewed and updated in the appropriate sections. PAST MEDICAL HISTORY:  Past Medical History:   Diagnosis Date    Primary osteoarthritis of both hips     Seasonal allergies        History reviewed. No pertinent surgical history.     CURRENT MEDICATIONS:    Current Outpatient Medications:     vitamin D (ERGOCALCIFEROL) 1.25 MG (08536 UT) CAPS capsule, Take 1 capsule by mouth once a week, Disp: 12 capsule, Rfl: 1    atorvastatin (LIPITOR) 20 MG tablet, Take 1 tablet by mouth daily, Disp: 30 tablet, Rfl: 5    loratadine (CLARITIN) 10 MG tablet, , Disp: , Rfl:     aspirin 81 MG EC tablet, Take 81 mg by mouth daily, Disp: , Rfl:     albuterol sulfate HFA (VENTOLIN HFA) 108 (90 Base) MCG/ACT inhaler, Inhale 2 puffs into the lungs 4 times daily as needed for Wheezing, Disp: 1 Inhaler, Rfl: 0    ALLERGIES:   No Known Allergies    FAMILY HISTORY: Meetings:     Marital Status:    Intimate Partner Violence:     Fear of Current or Ex-Partner:     Emotionally Abused:     Physically Abused:     Sexually Abused:          REVIEW OF SYSTEMS: A 12-point review of systems was obtained and pertinent positives and negatives were listed below. REVIEW OF SYSTEMS:     Constitutional: No fever, no chills, no lethargy, no weakness. HEENT:  No headache, otalgia, itchy eyes, nasal discharge or sore throat. Cardiac:  No chest pain, dyspnea, orthopnea or PND. Chest:   No cough, phlegm or wheezing. Abdomen:      Detailed by MA   Neuro:  No focal weakness, abnormal movements or seizure like activity. Skin:   No rashes, no itching. :   No hematuria, no pyuria, no dysuria, no flank pain. Extremities:  No swelling or joint pains. ROS was otherwise negative    Review of Systems   Constitutional: Negative. HENT: Negative. Eyes: Negative. Respiratory: Negative. Cardiovascular: Negative. Gastrointestinal: Positive for blood in stool. Endocrine: Negative. Genitourinary: Negative. Musculoskeletal: Negative. Skin: Negative. Allergic/Immunologic: Negative. Neurological: Negative. Hematological: Negative. Psychiatric/Behavioral: Negative. All other systems reviewed and are negative. PHYSICAL EXAMINATION: Vital signs reviewed per the nursing documentation. BP (!) 153/92   Wt 197 lb (89.4 kg)   BMI 29.51 kg/m²   Body mass index is 29.51 kg/m². Physical Exam    Physical Exam   Constitutional: Patient is oriented to person, place, and time. Patient appears well-developed and well-nourished. HENT:   Head: Normocephalic and atraumatic. Eyes: Pupils are equal, round, and reactive to light. EOM are normal.   Neck: Normal range of motion. Neck supple. No JVD present. No tracheal deviation present. No thyromegaly present. Cardiovascular: Normal rate, regular rhythm, normal heart sounds and intact distal pulses. Pulmonary/Chest: Effort normal and breath sounds normal. No stridor. No respiratory distress. He has no wheezes. He has no rales. He exhibits no tenderness. Abdominal: Soft. Bowel sounds are normal. He exhibits no distension and no mass. There is no tenderness. There is no rebound and no guarding. No hernia. Musculoskeletal: Normal range of motion. Lymphadenopathy:    Patient has no cervical adenopathy. Neurological: Patient is alert and oriented to person, place, and time. Psychiatric: Patient has a normal mood and affect. Patient behavior is normal.       LABORATORY DATA: Reviewed  Lab Results   Component Value Date    WBC 9.8 05/21/2021    HGB 16.3 05/21/2021    HCT 53.0 (H) 05/21/2021    MCV 93.0 05/21/2021     05/21/2021     05/21/2021    K 4.1 05/21/2021     05/21/2021    CO2 24 05/21/2021    BUN 12 05/21/2021    CREATININE 0.90 05/21/2021    LABALBU 4.5 05/21/2021    BILITOT 0.42 05/21/2021    ALKPHOS 61 05/21/2021    AST 21 05/21/2021    ALT 23 05/21/2021         Lab Results   Component Value Date    RBC 5.70 05/21/2021    HGB 16.3 05/21/2021    MCV 93.0 05/21/2021    MCH 28.6 05/21/2021    MCHC 30.8 05/21/2021    RDW 14.6 (H) 05/21/2021    MPV 10.8 05/21/2021    BASOPCT 1 05/21/2021    LYMPHSABS 2.86 05/21/2021    MONOSABS 0.68 05/21/2021    NEUTROABS 5.81 05/21/2021    EOSABS 0.39 05/21/2021    BASOSABS 0.06 05/21/2021         DIAGNOSTIC TESTING:     XR CERVICAL SPINE (4-5 VIEWS)    Result Date: 5/24/2021  EXAMINATION: 5 XRAY VIEWS OF THE CERVICAL SPINE 5/24/2021 11:16 am COMPARISON: None. HISTORY: ORDERING SYSTEM PROVIDED HISTORY: Neck pain TECHNOLOGIST PROVIDED HISTORY: neck pain Reason for Exam: Pt states has had some neck pain that started 1-2 months ago to the left side of his neck. Pain and now has some/tingling/numbness to left side of neck also. No known injury Acuity: Acute Type of Exam: Initial FINDINGS: The vertebral body heights are maintained.   There is degenerative disc disease most pronounced at the C5-6 and C6-7 levels. There is multilevel degenerative facet hypertrophy. There is no spondylolisthesis. There is a degree of bilateral foraminal narrowing at C5-6 and C6-7. The prevertebral soft tissues are unremarkable. The lung apices are without acute process. Multilevel degenerative change with a degree of bilateral foraminal narrowing at C5-6 and C6-7. CT LUNG SCREENING    Result Date: 2021  EXAMINATION: LOW DOSE SCREENING CT OF THE CHEST WITHOUT CONTRAST 2021 2:38 pm TECHNIQUE: Low dose lung cancer screening CT of the chest was performed without the administration of intravenous contrast.  Multiplanar reformatted images are provided for review. Dose modulation, iterative reconstruction, and/or weight based adjustment of the mA/kV was utilized to reduce the radiation dose to as low as reasonably achievable. COMPARISON: None. HISTORY: Screening. Patient Age: 63 y/o Number of pack years of smokin If no longer smoking, number of years since cessation:  Current everyday smoker. Other symptoms:  None. Additional history: ORDERING SYSTEM PROVIDED HISTORY: Current smoker TECHNOLOGIST PROVIDED HISTORY: Is there documentation of shared decision making? ->Yes Does the patient show any signs or symptoms of lung cancer? ->No Is this the first (baseline) CT or an annual exam?->Baseline Is this a low dose CT or a routine CT?->Low Dose CT Smoking Status?->Current Every Day Smoker Smoking packs per day?->1 Years smoking?->46 Reason for Exam: Lung screening Acuity: Acute Type of Exam: Initial Relevant Medical/Surgical History: COPD FINDINGS: Mediastinum:  Mild mediastinal adenopathy is noted with a 12 mm precarinal lymph node present. No acute aortic abnormality, cardiomegaly, pericardial effusion or epicardial adenopathy is noted. Coronary artery calcification is demonstrated. Lungs/Pleura:  Mild emphysematous changes are present.   Basilar intending to generate a document that actually reflects the content of the visit, the document can still have some errors including those of syntax and sound a like substitutions which may escape proof reading. It such instances, actual meaning can be extrapolated by contextual diversion.

## 2021-06-15 NOTE — FLOWSHEET NOTE
[x] [facility] [place]  Outpatient Rehabilitation &  Therapy  [address]  P: [phone]  F: [phone] [] [address]  [address]: [phone]  F: [phone]      Physical Therapy Daily Treatment/Discharge Note    Date:  6/15/2021  Patient Name:  Caro Nazario    :  1960  MRN: [id number]  Physician: Dr. Delarosa Du: liudmila fuller (30 visits/yr)  Medical Diagnosis: Cervical radiculopathy Rehab Codes: M54.2  Onset Date:  ???                 Next 's appt.: TBD  Visit# / total visits:     Cancels/No Shows: 0/0    Subjective:    Pain:  [x] Yes  [] No Location: L side Neck Pain    Rating: (0-10 scale) 0-1/10  Pain altered Tx:  [x] No  [] Yes  Action:  Comments: Pt reports his neck was stiff the morning after his  last visit, but he is unsure if it is due to working out (I.e. PT) or sleeping wrong. Objective:  Modalities: none  Precautions: standard  Exercises:  Exercise Reps/ Time Weight/ Level Issued for HEP Completed Comments   UBE 2/2  L1. 5   X                  Shoulder flex/abd 2x10 ea  3 lbs x X     Shrugs 2x10 6 lbs   X     UT stretch 2x20\" ea     X Can use ipsilateral hand to apply LIGHT pressure   Lev scap stretch 2x15\" ea          C AROM 10x ea     X  apply light overpressure at end range w/ ipsilat UE   Sports cords rows 20x Blue    X     Sports cords ext 20 green   X     Supine on 1/2 foam roller 5'     X     Body Blade 10x     X flexion   Prone shoulder I-T-Y 2x10 0 lbs  X    Other:      Treatment Charges: Mins Units   []  Modalities: MHP     [x]  Ther Exercise 50 3   []  Manual Therapy     []  Ther Activities     []  Aquatics     []  Vasocompression     []  Other     Total Treatment time 50 3       Assessment: [] Progressing toward goals. Advanced his program to add resistance. Pt wishes to be DC'd at this time. [x] No change.       [] Other:  [x] Patient would continue to benefit from skilled physical therapy services in order to: decrease pins/needles feeling on the L lateral side of his neck      STG: (to be met in 6 treatments)  1. ? Pain: in neck  2. ? ROM: to minimal restriction with SB and rot  3. ? Function: able to sleep on his L side with decreased discomfort. 4. Patient to be independent with home exercise program as demonstrated by performance with correct form without cues. LTG: (to be met in 12 treatments)  1. Minimal to no paresthesias in L lateral neck  2. Independent with proper computer workstation setup          Pt. Education:  [x] Yes  [] No  [x] Reviewed Prior HEP/Ed  Method of Education: [] Verbal  [x] Demo  [x] Written  Comprehension of Education:  [] Verbalizes understanding. [] Demonstrates understanding. [] Needs review. [x] Demonstrates/verbalizes HEP/Ed previously given. Plan: [] Continue current frequency toward long and short term goals.     [x] DC to HEP at this time       Time In: 4630  Time Out: 1205    Electronically signed by:  Kat Friend, PT

## 2021-06-16 ENCOUNTER — TELEPHONE (OUTPATIENT)
Dept: GASTROENTEROLOGY | Age: 61
End: 2021-06-16

## 2021-06-16 NOTE — TELEPHONE ENCOUNTER
BERNAG - 7/7/21 9:00 am   Screening colonoscopy  LVM for patient to call writer regarding COVID vaccine  COVID appt 7/3/21 SA 7:30 am  Mailed Eleazar/Mag prep.

## 2021-06-17 ENCOUNTER — OFFICE VISIT (OUTPATIENT)
Dept: FAMILY MEDICINE CLINIC | Age: 61
End: 2021-06-17
Payer: MEDICARE

## 2021-06-17 VITALS
TEMPERATURE: 97.6 F | DIASTOLIC BLOOD PRESSURE: 78 MMHG | BODY MASS INDEX: 29.71 KG/M2 | HEIGHT: 69 IN | WEIGHT: 200.6 LBS | SYSTOLIC BLOOD PRESSURE: 120 MMHG

## 2021-06-17 DIAGNOSIS — R59.1 LYMPHADENOPATHY: Primary | ICD-10-CM

## 2021-06-17 DIAGNOSIS — J43.8 OTHER EMPHYSEMA (HCC): ICD-10-CM

## 2021-06-17 PROCEDURE — G8417 CALC BMI ABV UP PARAM F/U: HCPCS | Performed by: FAMILY MEDICINE

## 2021-06-17 PROCEDURE — 99213 OFFICE O/P EST LOW 20 MIN: CPT | Performed by: FAMILY MEDICINE

## 2021-06-17 PROCEDURE — G8926 SPIRO NO PERF OR DOC: HCPCS | Performed by: FAMILY MEDICINE

## 2021-06-17 PROCEDURE — 4004F PT TOBACCO SCREEN RCVD TLK: CPT | Performed by: FAMILY MEDICINE

## 2021-06-17 PROCEDURE — 3017F COLORECTAL CA SCREEN DOC REV: CPT | Performed by: FAMILY MEDICINE

## 2021-06-17 PROCEDURE — 3023F SPIROM DOC REV: CPT | Performed by: FAMILY MEDICINE

## 2021-06-17 PROCEDURE — G8427 DOCREV CUR MEDS BY ELIG CLIN: HCPCS | Performed by: FAMILY MEDICINE

## 2021-06-17 ASSESSMENT — ENCOUNTER SYMPTOMS
WHEEZING: 1
EYES NEGATIVE: 1
GASTROINTESTINAL NEGATIVE: 1
COUGH: 1

## 2021-06-17 NOTE — PROGRESS NOTES
[address] PRIMARY CARE  [address] [place] [address]  Dept: [phone]  Dept Fax: [phone]    Luis Marinelli is a 63 y.o. male who is a Established patient, who presents today for his medical conditions/complaints as noted below:  Chief Complaint   Patient presents with    1 Month Follow-Up     on testing         HPI:     The patient is a 63year old male with a recent diagnosis of prediabetes, high cholesterol, possible hepatitis C, tobacco use who presents today to discuss CT lung cancer screening. Patient had completed CT lung cancer screening on. It did show emphysema as well as significant mediastinal lymphadenopathy. The patient denies any recent infections, fever, chills, nausea, vomiting, diarrhea, weight loss, weight gain, exposure to histoplasmosis or cryptococcus. The patient has lived in multiple different states throughout his lifetime has been in [place] for a while now. Patient is a chronic smoker. Hemoglobin A1C (%)   Date Value   05/26/2021 6.3 (H)             ( goal A1Cis < 7)   No results found for: LABMICR  LDL Cholesterol (mg/dL)   Date Value   06/18/2021 90   05/21/2021 148 (H)       (goal LDL is <100)   AST (U/L)   Date Value   06/18/2021 16     ALT (U/L)   Date Value   06/18/2021 22     BUN (mg/dL)   Date Value   06/18/2021 13     BP Readings from Last 3 Encounters:   06/17/21 120/78   06/15/21 132/86   05/26/21 130/82          (goal 120/80)    Past Medical History:   Diagnosis Date    Primary osteoarthritis of both hips     Seasonal allergies       History reviewed. No pertinent surgical history.     Family History   Problem Relation Age of Onset    Coronary Art Dis Mother     Other Mother         ulcers    Dementia Mother     Heart Attack Father     Diabetes Brother     Colon Cancer Neg Hx     Breast Cancer Neg Hx     Prostate Cancer Neg Hx        Social History     Tobacco Use    Smoking status: Current Every Day Smoker Packs/day: 1.00     Years: 44.00     Pack years: 44.00     Types: Cigarettes     Start date: 1/1/1976    Smokeless tobacco: Never Used   Substance Use Topics    Alcohol use: Not Currently     Comment: occassionly only drinnks every few months      Current Outpatient Medications   Medication Sig Dispense Refill    vitamin D (ERGOCALCIFEROL) 1.25 MG (55257 UT) CAPS capsule Take 1 capsule by mouth once a week 12 capsule 1    atorvastatin (LIPITOR) 20 MG tablet Take 1 tablet by mouth daily 30 tablet 5    loratadine (CLARITIN) 10 MG tablet       aspirin 81 MG EC tablet Take 81 mg by mouth daily      albuterol sulfate HFA (VENTOLIN HFA) 108 (90 Base) MCG/ACT inhaler Inhale 2 puffs into the lungs 4 times daily as needed for Wheezing 1 Inhaler 0     No current facility-administered medications for this visit. No Known Allergies    Health Maintenance   Topic Date Due    COVID-19 Vaccine (1) Never done    Shingles Vaccine (1 of 2) Never done    Colon cancer screen colonoscopy  Never done    Flu vaccine (Season Ended) 09/01/2021    A1C test (Diabetic or Prediabetic)  05/26/2022    Low dose CT lung screening  06/01/2022    Lipid screen  06/18/2022    DTaP/Tdap/Td vaccine (2 - Td or Tdap) 01/01/2026    Pneumococcal 0-64 years Vaccine (2 of 2) 06/09/2027    Hepatitis C screen  Completed    HIV screen  Completed    Hepatitis A vaccine  Aged Out    Hepatitis B vaccine  Aged Out    Hib vaccine  Aged Out    Meningococcal (ACWY) vaccine  Aged Out       Subjective:     Review of Systems   Constitutional: Negative. HENT: Negative. Eyes: Negative. Respiratory: Positive for cough (first thing in the morning) and wheezing. Cardiovascular: Negative. Gastrointestinal: Negative. Genitourinary: Negative. Musculoskeletal: Negative. Skin: Negative. Allergic/Immunologic: Negative for environmental allergies, food allergies and immunocompromised state. Neurological: Negative. Psychiatric/Behavioral: Negative. Objective:     Physical Exam  Vitals and nursing note reviewed. Constitutional:       General: He is not in acute distress. Appearance: Normal appearance. He is not ill-appearing, toxic-appearing or diaphoretic. HENT:      Head: Normocephalic and atraumatic. Right Ear: External ear normal.      Left Ear: External ear normal.   Eyes:      Extraocular Movements: Extraocular movements intact. Conjunctiva/sclera: Conjunctivae normal.   Cardiovascular:      Rate and Rhythm: Normal rate and regular rhythm. Pulses: Normal pulses. Heart sounds: Normal heart sounds. No murmur heard. No friction rub. No gallop. Pulmonary:      Effort: Pulmonary effort is normal. No respiratory distress. Breath sounds: Normal breath sounds. No stridor. No wheezing, rhonchi or rales. Musculoskeletal:      Cervical back: Normal range of motion. Neurological:      General: No focal deficit present. Mental Status: He is alert. Mental status is at baseline. Psychiatric:         Mood and Affect: Mood normal.         Behavior: Behavior normal.         Thought Content: Thought content normal.         Judgment: Judgment normal.       /78 (Site: Right Upper Arm, Position: Sitting, Cuff Size: Large Adult)   Temp 97.6 °F (36.4 °C) (Temporal)   Ht 5' 8.5\" (1.74 m)   Wt 200 lb 9.6 oz (91 kg)   BMI 30.06 kg/m²     Assessment:       Diagnosis Orders   1. Lymphadenopathy  [facility], DO, Pulmonology, [place]    Histoplasma Antibodies    Histoplasma Antigen, Serum    Cryptococcal Antigen    Lipid, Fasting    Comprehensive Metabolic Panel, Fasting    Angiotensin Converting Enzyme    Quantiferon (R) TB Gold, (Incubated)    Anti-Neutrophilic Cytoplasmic Antibody    C-Reactive Protein    Sedimentation Rate    Lactate Dehydrogenase   2.  Other emphysema ([facility] [place] [address]  Spirometry With Bronchodilator             Plan:    Lymphadenopathy, emphysema, history of smokingI have ordered labs including histoplasmosis, cryptococcus, ACE level to assess for the possibility of sarcoidosis, histoplasmosis, crackle infection. I would like the patient to follow-up with pulmonary to discuss findings further. I would like him to discuss with them as well as emphysema make sure treatment. I would like him to get spirometry as well to assess stage of COPD. Albuterol as needed. Return in about 3 months (around 9/17/2021) for lipid .     Orders Placed This Encounter   Procedures    Quantiferon (R) TB Gold, (Incubated)     Standing Status:   Future     Number of Occurrences:   1     Standing Expiration Date:   6/17/2022    Histoplasma Antibodies     Standing Status:   Future     Number of Occurrences:   1     Standing Expiration Date:   6/17/2022    Histoplasma Antigen, Serum     Standing Status:   Future     Number of Occurrences:   1     Standing Expiration Date:   6/17/2022    Cryptococcal Antigen     Standing Status:   Future     Number of Occurrences:   1     Standing Expiration Date:   6/17/2022    Lipid, Fasting     Standing Status:   Future     Number of Occurrences:   1     Standing Expiration Date:   6/17/2022    Comprehensive Metabolic Panel, Fasting     Standing Status:   Future     Number of Occurrences:   1     Standing Expiration Date:   6/17/2022    Angiotensin Converting Enzyme     Standing Status:   Future     Number of Occurrences:   1     Standing Expiration Date:   6/17/2022    Anti-Neutrophilic Cytoplasmic Antibody     Standing Status:   Future     Number of Occurrences:   1     Standing Expiration Date:   6/17/2022    C-Reactive Protein     Standing Status:   Future     Number of Occurrences:   1     Standing Expiration Date:   6/17/2022    Sedimentation Rate     Standing Status:   Future     Number of Occurrences:   1     Standing Expiration Date:   6/17/2022    Lactate Dehydrogenase     Standing Status:   Future     Number of Occurrences:   1

## 2021-06-18 ENCOUNTER — HOSPITAL ENCOUNTER (OUTPATIENT)
Age: 61
Setting detail: SPECIMEN
Discharge: HOME | End: 2021-06-18
Payer: MEDICARE

## 2021-06-18 DIAGNOSIS — R59.1 LYMPHADENOPATHY: ICD-10-CM

## 2021-06-18 LAB
ALBUMIN SERPL-MCNC: 4.4 G/DL (ref 3.5–5.2)
ALBUMIN/GLOBULIN RATIO: 1.4 (ref 1–2.5)
ALP BLD-CCNC: 65 U/L (ref 40–129)
ALT SERPL-CCNC: 22 U/L (ref 5–41)
ANGIOTENSIN-CONVERTING ENZYME: 22 U/L (ref 8–52)
ANION GAP SERPL CALCULATED.3IONS-SCNC: 10 MMOL/L (ref 9–17)
AST SERPL-CCNC: 16 U/L
BILIRUB SERPL-MCNC: 0.66 MG/DL (ref 0.3–1.2)
BUN BLDV-MCNC: 13 MG/DL (ref 6–20)
BUN/CREAT BLD: NORMAL (ref 9–20)
C-REACTIVE PROTEIN: 19.4 MG/L (ref 0–5)
CALCIUM SERPL-MCNC: 9.2 MG/DL (ref 8.6–10.4)
CHLORIDE BLD-SCNC: 106 MMOL/L (ref 98–107)
CHOLESTEROL, FASTING: 152 MG/DL
CHOLESTEROL/HDL RATIO: 3.6
CO2: 23 MMOL/L (ref 20–31)
CREAT SERPL-MCNC: 0.82 MG/DL (ref 0.7–1.2)
GFR AFRICAN AMERICAN: >60 ML/MIN
GFR NON-AFRICAN AMERICAN: >60 ML/MIN
GFR SERPL CREATININE-BSD FRML MDRD: NORMAL ML/MIN/{1.73_M2}
GFR SERPL CREATININE-BSD FRML MDRD: NORMAL ML/MIN/{1.73_M2}
GLUCOSE FASTING: 99 MG/DL (ref 70–99)
HDLC SERPL-MCNC: 42 MG/DL
LACTATE DEHYDROGENASE: 154 U/L (ref 135–225)
LDL CHOLESTEROL: 90 MG/DL (ref 0–130)
POTASSIUM SERPL-SCNC: 4.2 MMOL/L (ref 3.7–5.3)
SEDIMENTATION RATE, ERYTHROCYTE: 25 MM (ref 0–20)
SODIUM BLD-SCNC: 139 MMOL/L (ref 135–144)
TOTAL PROTEIN: 7.5 G/DL (ref 6.4–8.3)
TRIGLYCERIDE, FASTING: 100 MG/DL
VLDLC SERPL CALC-MCNC: NORMAL MG/DL (ref 1–30)

## 2021-06-19 LAB — CRYPTOCOCCAL ANTIGEN: NEGATIVE

## 2021-06-20 LAB
QUANTI TB GOLD PLUS: NEGATIVE
QUANTI TB1 MINUS NIL: 0.02 IU/ML (ref 0–0.34)
QUANTI TB2 MINUS NIL: 0 IU/ML (ref 0–0.34)
QUANTIFERON MITOGEN: >10 IU/ML
QUANTIFERON NIL: 0.01 IU/ML

## 2021-06-21 ENCOUNTER — HOSPITAL ENCOUNTER (OUTPATIENT)
Dept: ULTRASOUND IMAGING | Facility: CLINIC | Age: 61
Discharge: HOME | End: 2021-06-23
Payer: MEDICARE

## 2021-06-21 DIAGNOSIS — R76.8 HEPATITIS C ANTIBODY POSITIVE IN BLOOD: ICD-10-CM

## 2021-06-21 LAB
ANCA MYELOPEROXIDASE: <0.3 AU/ML (ref 0–3.5)
ANCA PROTEINASE 3: <0.7 AU/ML (ref 0–2)

## 2021-06-21 PROCEDURE — 76705 ECHO EXAM OF ABDOMEN: CPT

## 2021-06-23 LAB
HISTOPLASMA AG, S: NORMAL
HISTOPLASMA ANTIGEN, SERUM: NORMAL

## 2021-06-28 LAB
HISTOPLASMA ABS, ID: NORMAL
HISTOPLASMA ANTIBODY MYCELIAL CF: NORMAL
HISTOPLASMA ANTIBODY YEAST CF: NORMAL

## 2021-06-29 ENCOUNTER — HOSPITAL ENCOUNTER (OUTPATIENT)
Dept: DIABETES SERVICES | Age: 61
Setting detail: THERAPIES SERIES
Discharge: HOME | End: 2021-06-29
Payer: MEDICARE

## 2021-06-29 DIAGNOSIS — R73.03 DIABETES MELLITUS, LATENT: Primary | ICD-10-CM

## 2021-06-29 PROCEDURE — 97802 MEDICAL NUTRITION INDIV IN: CPT

## 2021-06-29 NOTE — PROGRESS NOTES
Diabetes Self- Management Education Program Assessment - PHONE  This visit was done on the telephone  (During OBVVG-24 public health emergency). Pursuant to the emergency declaration under the [facility], [address] [place] [address] authority and the [employer] Act, this visit was conducted with patient's (and/or legal guardian's) consent, to reduce the patient's risk of exposure to COVID-19 and provide necessary medical care. The patient (and/or legal guardian) has also been advised to contact this office for worsening conditions or problems, and seek emergency medical treatment and/or call 911 if deemed necessary. Patient identification was verified at the start of the visit: Yes    Total time spent for this encounter: 60 min  - this encounter was done as one on one virtual /  phone visit as no group sessions being offered for 2 months due to covid - 19 pandemic    Services were provided through a phone discussion to substitute for in-person clinic visit. Patient and provider were located at their individual home/office. Also see Diabetic Screening  Patient, Lainey Livingston, contacted via phone call encounters for diabetes self-management education assessment on 6/21/21       MEDICAL HISTORY:  Past Medical History:   Diagnosis Date    Primary osteoarthritis of both hips     Primary osteoarthritis of left hip 6/22/2017    Seasonal allergies      Family History   Problem Relation Age of Onset    Coronary Art Dis Mother     Other Mother         ulcers    Dementia Mother     Heart Attack Father     Diabetes Brother     Colon Cancer Neg Hx     Breast Cancer Neg Hx     Prostate Cancer Neg Hx      Patient has no known allergies.    Immunization History   Administered Date(s) Administered    Influenza, Quadv, Recombinant, IM PF (Flublok 18 yrs and older) 10/12/2019    Pneumococcal Polysaccharide ([id number]) 05/20/2021    Tdap (Boostrix, Adacel) 01/01/2016     Current Medications  Current Outpatient Medications   Medication Sig Dispense Refill    vitamin D (ERGOCALCIFEROL) 1.25 MG (07742 UT) CAPS capsule Take 1 capsule by mouth once a week 12 capsule 1    atorvastatin (LIPITOR) 20 MG tablet Take 1 tablet by mouth daily 30 tablet 5    loratadine (CLARITIN) 10 MG tablet       aspirin 81 MG EC tablet Take 81 mg by mouth daily      albuterol sulfate HFA (VENTOLIN HFA) 108 (90 Base) MCG/ACT inhaler Inhale 2 puffs into the lungs 4 times daily as needed for Wheezing 1 Inhaler 0     No current facility-administered medications for this encounter.   :     Comments:  Allergies:  No Known Allergies    A1C blood level   Lab Results   Component Value Date    LABA1C 6.3 (H) 05/26/2021     Lab Results   Component Value Date    GLUF 99 06/18/2021    CREATININE 0.82 06/18/2021       Blood pressure   BP Readings from Last 3 Encounters:   06/17/21 120/78   06/15/21 132/86   05/26/21 130/82        Cholesterol  Lab Results   Component Value Date    LDLCHOLESTEROL 90 06/18/2021        Diabetes Self- Management Education Record    Participant Name: Latisha Hart  Referring Provider: Diandra Elizonod MD   Assessment/Evaluation Ratings:  1=Needs Instruction   4=Demonstrates Understanding/Competency  2=Needs Review   NC=Not Covered    3=Comprehends Key Points  N/A=Not Applicable  Topics/Learning Objectives Pre-session Assess Date:   Instr. Date Reinforce Date Post- session Eval Comments   Diabetes disease process & Treatment process: Define diabetes & pre-diabetes; Identify own type of diabetes; role of the pancreas; signs/symptoms; diagnostic criteria; prevention & treatment options; contributing factors. 1    6-21-21 BB family hx of diabetes on his father's side. Incorporating nutritional management into lifestyle: Describe effect of type, amount & timing of food on blood glucose;  Describe basic meal planning techniques & current nutrition guideline   2 Tends to start day late with eating and long stretches between meals. Does make fairly healthy choices. 6/29/21 JW pt also watches sodium, sat fat. Discussed dinner plate guide and carbs. Pt frustrated w varying info on internet. Gave "[employer] and [employer] sites   What to eat - Food groups, When to eat - timing of meals and snacks, and How much to eat - portions control. calories/ day   CHO choices/ meal   CHO choices/  day   grams of protein /day   gram of fat /day     Correctly read food labels & demonstrate CHO counting & portion control with personalized meal plan. Identify dining out strategies, & dietary sick day guidelines. 1 6/29/21 JW      Incorporating physical activity into lifestyle:   Verbalize effect of exercise on blood glucose levels; benefits of regular exercise; safety considerations; contraindications; maintenance of activity. 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 NA       Monitoring blood glucose, interpreting and using results:  Identify recommended & personal blood glucose targets; importance of testing; testing supplies; HgbA1C target levels; Factors affecting blood glucose; Importance of logging blood glucose levels for pattern recognition; ketone testing; safe lancet disposal.   NA       Prevention, detection & treatment of acute complications:  Identify symptoms of hyper & hypoglycemia, and prevention & treatment strategies. 1       Describe sick day guidelines & indications for  physician notification. Identify short term consequences of poor control.  Disaster preparedness strategies    NA       Prevention, detection & treatment of chronic complications:  Define the natural course of diabetes & describe the relationship of blood glucose levels to long term complications of diabetes. Identify preventative measures & standards of care. 1       Developing strategies to address psychosocial issues:  Describe feelings about living with diabetes; Describe how stress, depression & anxiety affect blood glucose; Identify coping strategies; Identify support needed & support network available. 2       Developing strategies to promote health/change behavior: Identify 7 self-care behaviors; Personal health risk factors; Benefits, challenges & strategies for behavioral change;    2         Individualized goal selection. My goal , to help me improve my health, I will:   1. Being active - walk daily      2. Healthy eating - monitor intake, eat healthy balanced meals with portion control to support  weight loss (would like to decrease wt by 20 lbs)       Plan  Follow-up Appointments planned for 6-29-21 with dietician     Instruction Method: [x]Lecture/Discussion  []Power Point Presentation  [x]Handouts  []Return Demonstration    Education Materials/Equipment Provided (VIA Mail for phone visits)  :    [x]Self-Management - Initial assessment - Enrolment in to ADA  Where do I Begin, Living with Type 2 diabetes \"  ADA home support program and  handout on diabetes education classes. Mailed pre-diabetes information & Weight management info on 6-21-21    []Self-Management  Class 1 -Self-Management  Class 1 - \"How to Thrive: A guide for Your Journey with Diabetes\" ADA booklet 2020 -pages 4, 11- 15 , 25 -23   o one day food diary and envelope for return of diet HX   o  You tube and website resource sheet-Understanding Type 2 Diabetes from Animated Diabetes Patient [email or web address] be/[email or web address]      [x] Self-Management  Class 2 - Meal Plan and handout for serving sizes, smarter snacking, Ready Set Carb Counting / Plate Method, Nutrient Conversion and International Diabetes [address] Eating for People with Diabetes and Nutrition in the [employer] - fast facts about fast food and \"How to Thrive: A guide for Your journey with Diabetes\" - ADA booklet 2020  - pages 12 -176/29/21 JW    [] Self-Management  Class 3 -   \"How to Thrive: A guide for Your Journey with Diabetes\"  pages 6- 9 &  21 - 28,  type 2 diabetes and the role of GLP- 1,  Individualized Diabetes report card     [] Self-Management Class 4 - BD Booklet  Sick Day Rules and  Dinning Out Guide , recipe hand outs and tips, diabetes Cookbooks  ( when available), & \"How to Thrive: A guide for Your journey with Diabetes\" - ADA booklet 2020  - pages 39 -39     []Self-Management - 3 month follow -  AADE7 Self care behaviors work sheets,  Online resource list - March 2020 ,  How to Thrive: A guide for Your journey with Diabetes\" - ADA booklet 2020  - pages 39. []Self-Management  Gestational - RN class -Resource materials sent out : care booklet - \" Gestational Diabetes Mellitus ( GDM) toolkit form [place] gestational diabetes postpartum care learning collaborative 2018. \"Simple Guidelines for meal planning with gestational diabetes. SMBG sheets to fax back to [facility] weekly. BD  healthy injection site selection and rotation with 6 mm insulin syringe and 4 mm pen needle. Gestational diabetes handout from [facility] 2016. Did you have gestational diabetes when you were pregnant? Handout from [facility]  April 2014    []Self-Management Gestational - RD class - My Food Plan for Gestational diabetes    []Glucose Meter     []Insulin Kit     []Other      Encounter Type Date Start Time End Time Comments No Show Dates   Assessment   6-21-21 BB 3pm 4pm   []In Person  [x]Telephone    Class 1 - Understanding diabetes     []TelephoneAmerican Diabetes Association  www. diabetes. org    Class 2- Nutrition and diabetes   6/29/21 JW 3:00 4:00 [x]Telephone  Healthy Eating with Diabetes- Automatic Data of Diabetes and Digestive and Kidney   https://[email or web address] be/[email or web address]    Class 3 - Preventing Complications     []Telephone    Class 4 -  In depth Nutrition and sick day care    []Telephone  Diabetes Food hub  www. diabetesfoodhub.org     Class 5 - 3 month follow up / goal reassessment        Gestational - RN         Gestational - RD        Individual MNT         Shared Med Appt         Yearly Follow-up        Meter Instrx      How to Measure Your Blood Sugar - [facility] Patient Education  https://[email or web address] be/[email or web address]    Insulin Instrx      []Pen  []Vial & Syringe   BD Diabetes Care: How to Inject Insulin with a Pen Needle  https://[email or web address] [email or web address]    Diabetes Care: How to Inject Insulin with a Syringe  https://[email or web address] be/[email or web address]       DSMS Support :   [] MNT      [] Annual update     [] Starting Fresh  adults living with diabetes or pre diabetes. [address] [place] [address] [phone] call for dates    []  Diabetes Group at  [address] [place] - Free 6 week diabetes education support   classes - use web site interest form found at  VisConPro.pt - to enroll       []ADA  Where do I Begin, Living with Type 2 diabetes ADA home support program  Web site: diabetes. org/living    Call: 1800 DIABETES  e-mail: [email or web address]@[email or web address]. org     []  Internet web sites - ADAWeb site: diabetes. org and diabetesfoodhub.org      Post Education Referrals:      [] [facility] information sheet and [address] [facility] , 21       [] Dental care - [facility]     [] [facility] ([facility]) link  phone number - for information and referral to [address], WOUND, WEIGHT MANAGEMENT        []Other  Candace Cochran RD, LD

## 2021-06-30 ENCOUNTER — OFFICE VISIT (OUTPATIENT)
Dept: PULMONOLOGY | Age: 61
End: 2021-06-30
Payer: MEDICARE

## 2021-06-30 VITALS
RESPIRATION RATE: 15 BRPM | TEMPERATURE: 97.3 F | DIASTOLIC BLOOD PRESSURE: 78 MMHG | HEIGHT: 70 IN | HEART RATE: 69 BPM | WEIGHT: 199 LBS | SYSTOLIC BLOOD PRESSURE: 144 MMHG | OXYGEN SATURATION: 98 % | BODY MASS INDEX: 28.49 KG/M2

## 2021-06-30 DIAGNOSIS — R59.0 MEDIASTINAL ADENOPATHY: ICD-10-CM

## 2021-06-30 DIAGNOSIS — J44.9 COPD, SEVERITY TO BE DETERMINED (HCC): Primary | ICD-10-CM

## 2021-06-30 PROCEDURE — 3017F COLORECTAL CA SCREEN DOC REV: CPT | Performed by: INTERNAL MEDICINE

## 2021-06-30 PROCEDURE — 4004F PT TOBACCO SCREEN RCVD TLK: CPT | Performed by: INTERNAL MEDICINE

## 2021-06-30 PROCEDURE — G8417 CALC BMI ABV UP PARAM F/U: HCPCS | Performed by: INTERNAL MEDICINE

## 2021-06-30 PROCEDURE — 3023F SPIROM DOC REV: CPT | Performed by: INTERNAL MEDICINE

## 2021-06-30 PROCEDURE — G8926 SPIRO NO PERF OR DOC: HCPCS | Performed by: INTERNAL MEDICINE

## 2021-06-30 PROCEDURE — 94060 EVALUATION OF WHEEZING: CPT | Performed by: INTERNAL MEDICINE

## 2021-06-30 PROCEDURE — 99214 OFFICE O/P EST MOD 30 MIN: CPT | Performed by: INTERNAL MEDICINE

## 2021-06-30 PROCEDURE — G8427 DOCREV CUR MEDS BY ELIG CLIN: HCPCS | Performed by: INTERNAL MEDICINE

## 2021-06-30 NOTE — PROGRESS NOTES
PULMONARY MEDICINE OUTPATIENT  CONSULT NOTE                                                                       PATIENT:  Rebeka Brantley  YOB: 1960  MRN: [id number]     REFERRED BY: Jaquelin Gracia MD   CHIEF COMPLIANT: Breathing Problem (new patient)       HISTORY     HISTORY OF PRESENT ILLNESS:   Rebeka Brantley is a 63y.o. year old male here for [facility] care    Patient  reports that he has been smoking cigarettes. He started smoking about 45 years ago. He has a 44.00 pack-year smoking history. He has never used smokeless tobacco.  He has chronic cough and sputum. No PFT available. Patient on as needed albuterol. He underwent a lung cancer screening CT and was noted to have precarinal lymphadenopathy. PAST MEDICAL HISTORY:      Diagnosis Date    Primary osteoarthritis of both hips     Primary osteoarthritis of left hip 6/22/2017    Seasonal allergies      PAST SURGICAL HISTORY:  History reviewed. No pertinent surgical history. SOCIAL HISTORY:  TOBACCO:   reports that he has been smoking cigarettes. He started smoking about 45 years ago. He has a 44.00 pack-year smoking history. He has never used smokeless tobacco.  ETOH:   reports current alcohol use. DRUGS: reports previous drug use.      AVOCATION/OCCUPATIONAL EXPOSURE:  [] Asbestos      [] Hot tub  [] Silica dust    [] Pets  [] Coal            [] Exotic birds  [] [employer]       [] Tuberculosis  [] Roseanne Cone         [] Others  [] [place]          PHYSICAL EXAMINATION      VITAL SIGNS:   BP (!) 144/78   Pulse 69   Temp 97.3 °F (36.3 °C) (Temporal)   Resp 15   Ht 5' 9.5\" (1.765 m)   Wt 199 lb (90.3 kg)   SpO2 98%   BMI 28.97 kg/m²   Wt Readings from Last 3 Encounters:   09/01/21 196 lb (88.9 kg)   06/30/21 199 lb (90.3 kg)   06/17/21 200 lb 9.6 oz (91 kg)     SYSTEMIC EXAMINATION:   General appearance -  [x] well appearing  [x] overweight  [] obese [] cachectic [x] comfortable  [x] in no acute distress  [] chronically ill-appearing  [] in mild to moderate respiratory distress   Mental status -  [x] alert  [x] oriented to person, place, and time  [] anxious  [] depressed mood    Head-  [x] atraumatic  [x] normocephalic   Eyes -  [] pupils equal and reactive, extraocular eye movements intact  [] sclera anicteric   Ears -  [x] hearing grossly normal bilaterally [] bilateral TM's and external ear canals normal   Nose -  [x] normal and patent [] no erythema  [] discharge   Mouth -  [x] mucous membranes moist  [] pharynx normal without lesions [] erythematous  [] exudate noted   Mallampati Airway Score -  [] I (soft palate, uvula, fauces, tonsillar pillars visible) [] II (soft palate, uvula, fauces visible) []  III (soft palate, base of uvula visible) [] IV (only hard palate visible)   Neck -  [] supple  [] no significant adenopathy  [] carotids upstroke normal bilaterally [] no JVD  [] no bruit   Lymphatics -  [x] no palpable lymphadenopathy  [] no hepatosplenomegaly   Chest -   [x] normal chest excursion  [x] no chest wall tenderness  [] increased AP diameter[] pectus noted [] scoliosis noted [x] no tachypnea retraction or cyanosis [x] clear to auscultation, no wheezes, rales or rhonchi, symmetric air entry  [] wheezing noted  [] rales noted  []rhonchi noted [] decreased air entry noted bilaterally   Heart -  [x] normal rate,  [x] regular rhythm  [] irregularly irregular rhythm [x] normal S1  [x] normal S2  [x] no murmurs, rubs, clicks or gallops  [] S3 present  [] S4 present  [] systolic murmur  [] diastolic murmur  [] midsystolic click []pericardial rub present    Abdomen -  [] soft [] nontender  [] nondistended  [] no masses or organomegaly   Neurological -  [x] normal speech  [x] no focal findings or movement disorder noted  [] cranial nerves II through XII intact  [] DTR's normal and symmetric  [] Babinski sign negative,  [x] motor and sensory grossly normal bilaterally  [] normal muscle tone [x] no tremors  [] strength 5/5  [] Romberg sign negative [] normal gait    Musculoskeletal -  [x] no joint tenderness [x] no deformity or swelling   Extremities - [x] no clubbing or cyanosis,  [x] no pedal edema [] pedal edema  [] intact peripheral pulses   Skin -  [x] normal coloration and turgor  [x] no rashes  [] no suspicious skin lesions noted          MEDICAL DECISION MAKING     1. PROBLEMS ADDRESSED (NUMBER AND COMPLEXITY)       ICD-10-CM    1. COPD, severity to be determined ([facility]  J44.9 fluticasone-salmeterol (WIXELA INHUB) 250-50 MCG/DOSE AEPB     Full PFT Study With Bronchodilator   2. Mediastinal adenopathy  R59.0 CT CHEST W CONTRAST        2.  DATA REVIEWED AND ANALYZED (AMOUNT AND/OR COMPLEXITY)   LABS:  ABG:   No results found for: PH, PHART, PCO2, XLW2JHX, PO2, PO2ART, HCO3, FLN2LMJ, BE, BEART, THGB, M8MUJPZG  VBG:  No results found for: PHVEN, DIW5PVT, BEVEN, H6GXOVRW  CBC:   Lab Results   Component Value Date    WBC 9.8 05/21/2021    RBC 5.70 05/21/2021    HGB 16.3 05/21/2021    HCT 53.0 (H) 05/21/2021     05/21/2021    MCV 93.0 05/21/2021    MCH 28.6 05/21/2021    MCHC 30.8 05/21/2021    RDW 14.6 (H) 05/21/2021    LYMPHOPCT 29 05/21/2021    MONOPCT 7 05/21/2021    BASOPCT 1 05/21/2021    MONOSABS 0.68 05/21/2021    LYMPHSABS 2.86 05/21/2021    EOSABS 0.39 05/21/2021    BASOSABS 0.06 05/21/2021    DIFFTYPE NOT REPORTED 05/21/2021     Allergen Panel:No results found for: REGVALL  Eosinophils/IgE:   Lab Results   Component Value Date    EOSABS 0.39 05/21/2021     Alpha 1 antitrypsin: No results found for: A1A  CMP:   Lab Results   Component Value Date     06/18/2021    K 4.2 06/18/2021     06/18/2021    CO2 23 06/18/2021    BUN 13 06/18/2021    CREATININE 0.82 06/18/2021    CALCIUM 9.2 06/18/2021    PROT 7.7 09/01/2021    LABALBU 4.9 09/01/2021    BILITOT 0.40 09/01/2021    BILIDIR 0.11 09/01/2021    IBILI 0.29 09/01/2021    ALT 25 09/01/2021    AST 20 09/01/2021    ALKPHOS 66 09/01/2021 Coagulation Profile:   No results found for: INR, PROTIME, APTT  BNP:   No results found for: BNP, PROBNP  D-Dimer/Fibrinogen:  No results found for: DDIMER  Others labs:  Lab Results   Component Value Date    TSH 1.04 05/21/2021     Lab Results   Component Value Date    MPO <0.3 06/18/2021    PR3 <0.7 06/18/2021    SEDRATE 25 (H) 06/18/2021    CRP 19.4 (H) 06/18/2021     Lab Results   Component Value Date     06/18/2021     No results found for: SPEP, UPEP, PSA, CEA, , TY9603,   No results found for: RPR, HIV    RADIOLOGY:  [] Ordered  [] Unavailable  [x] Reviewed  CT chest 6/1/2021  FINDINGS:   Mediastinum:  Mild mediastinal adenopathy is noted with a 12 mm precarinal   lymph node present.  No acute aortic abnormality, cardiomegaly, pericardial   effusion or epicardial adenopathy is noted.  Coronary artery calcification is   demonstrated.       Lungs/Pleura:  Mild emphysematous changes are present.  Basilar atelectasis   is noted.  No noncalcified solid pulmonary nodules, areas of consolidation or   effusion are noted.       Upper Abdomen:  No acute abnormality in the included upper abdominal   structures.  Adrenals are unremarkable.       Soft Tissues/Bones: No acute osseous or soft tissue abnormality.  Minimal   spondylosis is present in the spine.           Impression   1.  Mild emphysematous changes.       2.  No solid noncalcified lung nodules.       3.  Prominent mediastinal lymph node.         ECHOCARDIOGRAM:  No results found for this or any previous visit. PULMONARY FUNCTION TEST:  [] Ordered  [x] Unavailable  [] Reviewed  No results found for: FEV1, FVC, CVW9OXQ, TLC, DLCO   No results found for this or any previous visit. 6 MINUTE WALK TEST:  [] Ordered  [x] Unavailable  [] Reviewed  No results found for this or any previous visit.     Distance Walk Predicted Distance LLN** Predicted % Duration (min) Duration of Stops Sec Karine Dyspnea Karine Fatigue               PRIOR EXTERNAL NOTES:  [] Unavailable  [x] Reviewed    ORDERS PLACED:  Orders Placed This Encounter   Procedures    CT CHEST W CONTRAST     Standing Status:   Future     Number of Occurrences:   1     Standing Expiration Date:   6/30/2022     Order Specific Question:   Reason for exam:     Answer:   FU Mediatinal adenopathy    Full PFT Study With Bronchodilator        3. RISK OF COMPLICATIONS AND/OR MORBIDITY OR MORTALITY:     ALLERGIES:  No Known Allergies   MEDICATIONS:  Outpatient Medications Prior to Visit   Medication Sig Dispense Refill    vitamin D (ERGOCALCIFEROL) 1.25 MG (89174 UT) CAPS capsule Take 1 capsule by mouth once a week 12 capsule 1    atorvastatin (LIPITOR) 20 MG tablet Take 1 tablet by mouth daily 30 tablet 5    aspirin 81 MG EC tablet Take 81 mg by mouth daily      albuterol sulfate HFA (VENTOLIN HFA) 108 (90 Base) MCG/ACT inhaler Inhale 2 puffs into the lungs 4 times daily as needed for Wheezing 1 Inhaler 0    loratadine (CLARITIN) 10 MG tablet  (Patient not taking: Reported on 6/30/2021)       No facility-administered medications prior to visit. PRESCRIPTION DRUG MANAGEMENT/RECOMMENDATIONS:   Ordered PFT   CT findings reviewed and discussed with patient   Ordered a CT chest in 3 months to reevaluate precarinal lymph node   Recommended starting Wixela   Continue as needed albuterol   Reviewed use of rescue vs controlling agents and potential side effects.  Reviewed use, techniques, schedule and side effects of all inhaled medications.  Refills were provided as requested.  Barriers to medication compliance addressed.  Patient to have prednisone and an antibiotic available for use during an exacerbation. SUPPLEMENTAL OXYGEN/NIV RECOMMENDATIONS:   Patient is not on home oxygen therapy.     SMOKING CESSATION RECOMMENDATIONS/COUNSELING:   Patient admonished to quit smoking    LIFESTYLE MODIFICATION RECOMMENDATIONS/COUNSELING:   Follow healthy behaviors: nutrition, exercise and medication adherence.  Maintain an active lifestyle. LUNG CANCER SCREENING/OTHER IMAGING RECOMMENDATIONS:   After reviewing the patient's smoking history, age and other clinical criteria, the LOW DOSE CT is : NOT INDICATED; Recent CT within 11 months     IMMUNIZATION HISTORY/RECOMMENDATIONS:  Immunization History   Administered Date(s) Administered    COVID-19, Pfizer, PF, 30mcg/0.3mL 07/01/2021, 08/29/2021    Influenza, Quadv, Recombinant, IM PF (Flublok 18 yrs and older) 10/12/2019    Pneumococcal Polysaccharide ([id number]) 05/20/2021    Tdap (Boostrix, Adacel) 01/01/2016     All the questions that the patient had were answered to his satisfaction  We'll see the patient back in three months  Thank you for having us involved in the care of your patient. Please call us if you have any questions or concerns. Puma Berman MD  Pulmonary and Critical Care Medicine            Please note that this chart was generated using voice recognition Dragon dictation software. Although every effort was made to ensure the accuracy of this automated transcription, some errors in transcription may have occurred.

## 2021-07-20 ENCOUNTER — HOSPITAL ENCOUNTER (OUTPATIENT)
Dept: DIABETES SERVICES | Age: 61
Setting detail: THERAPIES SERIES
Discharge: HOME | End: 2021-07-20
Payer: MEDICARE

## 2021-07-20 DIAGNOSIS — R73.03 DIABETES MELLITUS, LATENT: Primary | ICD-10-CM

## 2021-07-20 PROCEDURE — 97803 MED NUTRITION INDIV SUBSEQ: CPT

## 2021-07-20 NOTE — PROGRESS NOTES
Diabetes Self- Management Education Program Assessment - PHONE  This visit was done on the telephone  (During KNFBE-05 public health emergency). Pursuant to the emergency declaration under the [facility], [address] [place] [address] authority and the [employer] Act, this visit was conducted with patient's (and/or legal guardian's) consent, to reduce the patient's risk of exposure to COVID-19 and provide necessary medical care. The patient (and/or legal guardian) has also been advised to contact this office for worsening conditions or problems, and seek emergency medical treatment and/or call 911 if deemed necessary. Patient identification was verified at the start of the visit: Yes    Total time spent for this encounter: 60 min  - this encounter was done as one on one virtual /  phone visit as no group sessions being offered for 2 months due to covid - 19 pandemic  7/20/21 [facility] were provided through a phone discussion to substitute for in-person clinic visit. Patient and provider were located at their individual home/office. Also see Diabetic Screening  Patient, Fe Shanks, contacted via phone call encounters for diabetes self-management education assessment on 6/21/21       MEDICAL HISTORY:  Past Medical History:   Diagnosis Date    Primary osteoarthritis of both hips     Primary osteoarthritis of left hip 6/22/2017    Seasonal allergies      Family History   Problem Relation Age of Onset    Coronary Art Dis Mother     Other Mother         ulcers    Dementia Mother     Heart Attack Father     Diabetes Brother     Colon Cancer Neg Hx     Breast Cancer Neg Hx     Prostate Cancer Neg Hx      Patient has no known allergies.    Immunization History   Administered Date(s) Administered    Influenza, Quadv, Recombinant, IM PF (Flublok 18 yrs and older) 10/12/2019    Pneumococcal Polysaccharide ([id number]) 05/20/2021    Tdap (Boostrix, Adacel) 01/01/2016     Current Medications  Current Outpatient Medications   Medication Sig Dispense Refill    fluticasone-salmeterol (WIXELA INHUB) 250-50 MCG/DOSE AEPB Inhale 1 puff into the lungs every 12 hours 60 each 3    vitamin D (ERGOCALCIFEROL) 1.25 MG (09055 UT) CAPS capsule Take 1 capsule by mouth once a week 12 capsule 1    atorvastatin (LIPITOR) 20 MG tablet Take 1 tablet by mouth daily 30 tablet 5    aspirin 81 MG EC tablet Take 81 mg by mouth daily      albuterol sulfate HFA (VENTOLIN HFA) 108 (90 Base) MCG/ACT inhaler Inhale 2 puffs into the lungs 4 times daily as needed for Wheezing 1 Inhaler 0     No current facility-administered medications for this encounter.   :     Comments:  Allergies:  No Known Allergies    A1C blood level   Lab Results   Component Value Date    LABA1C 6.3 (H) 05/26/2021     Lab Results   Component Value Date    GLUF 99 06/18/2021    CREATININE 0.82 06/18/2021       Blood pressure   BP Readings from Last 3 Encounters:   06/30/21 (!) 144/78   06/17/21 120/78   06/15/21 132/86        Cholesterol  Lab Results   Component Value Date    LDLCHOLESTEROL 90 06/18/2021        Diabetes Self- Management Education Record    Participant Name: Neri Aguayo  Referring Provider: Dustin Martinez MD   Assessment/Evaluation Ratings:  1=Needs Instruction   4=Demonstrates Understanding/Competency  2=Needs Review   NC=Not Covered    3=Comprehends Key Points  N/A=Not Applicable  Topics/Learning Objectives Pre-session Assess Date:   Instr. Date Reinforce Date Post- session Eval Comments   Diabetes disease process & Treatment process: Define diabetes & pre-diabetes; Identify own type of diabetes; role of the pancreas; signs/symptoms; diagnostic criteria; prevention & treatment options; contributing factors. 1    6-21-21 BB family hx of diabetes on his father's side.    Incorporating nutritional management into lifestyle: Describe effect of type, amount & timing of food on blood glucose; Describe basic meal planning techniques & current nutrition guideline   2 Tends to start day late with eating and long stretches between meals. Does make fairly healthy choices. 6/29/21 JW pt also watches sodium, sat fat. Discussed dinner plate guide and carbs. Pt frustrated w varying info on internet. Gave [employer] and [employer] sites 7/20/21 JW denies questions. Cont to try to make healthy choices and eat regular. Biggest issue is balance and portions. Discussed carbs and sent sample menu using carbs. What to eat - Food groups, When to eat - timing of meals and snacks, and How much to eat - portions control. 1800 calories/ day   CHO choices/ meal 4,1,4,1,4,1   CHO choices/  day   grams of protein /day   gram of fat /day     Correctly read food labels & demonstrate CHO counting & portion control with personalized meal plan. Identify dining out strategies, & dietary sick day guidelines. 1 6/29/21 JW 7/20/21 JW     Incorporating physical activity into lifestyle:   Verbalize effect of exercise on blood glucose levels; benefits of regular exercise; safety considerations; contraindications; maintenance of activity. 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 NA       Monitoring blood glucose, interpreting and using results:  Identify recommended & personal blood glucose targets; importance of testing; testing supplies; HgbA1C target levels; Factors affecting blood glucose;  Importance of logging blood glucose levels for pattern recognition; ketone testing; safe lancet disposal.   NA    7/20/21 JW pt asked about and discussed blood glucose monitoring. Prevention, detection & treatment of acute complications:  Identify symptoms of hyper & hypoglycemia, and prevention & treatment strategies. 1       Describe sick day guidelines & indications for  physician notification. Identify short term consequences of poor control. Disaster preparedness strategies    NA       Prevention, detection & treatment of chronic complications:  Define the natural course of diabetes & describe the relationship of blood glucose levels to long term complications of diabetes. Identify preventative measures & standards of care. 1       Developing strategies to address psychosocial issues:  Describe feelings about living with diabetes; Describe how stress, depression & anxiety affect blood glucose; Identify coping strategies; Identify support needed & support network available. 2       Developing strategies to promote health/change behavior: Identify 7 self-care behaviors; Personal health risk factors; Benefits, challenges & strategies for behavioral change;    2         Individualized goal selection. My goal , to help me improve my health, I will:   1. Being active - walk daily      2. Healthy eating - monitor intake, eat healthy balanced meals with portion control to support  weight loss (would like to decrease wt by 20 lbs)       Plan  Follow-up Appointments planned for 6-29-21 with dietician     Instruction Method: [x]Lecture/Discussion  []Power Point Presentation  [x]Handouts  []Return Demonstration    Education Materials/Equipment Provided (VIA Mail for phone visits)  :    [x]Self-Management - Initial assessment - Enrolment in to ADA  Where do I Begin, Living with Type 2 diabetes \"  ADA home support program and  handout on diabetes education classes.    Mailed pre-diabetes information & Weight management info on 6-21-21    []Self-Management  Class 1 -Self-Management  Class 1 - \"How to Thrive: A guide for Your Journey with Diabetes\" ADA booklet 2020 -pages 4, 11- 15 , 18 -23   o one day food diary and envelope for return of diet HX   o  You tube and website resource sheet-Understanding Type 2 Diabetes from Animated Diabetes Patient [email or web address]://[email or web address] be/[email or web address]      [x] Self-Management  Class 2 - Meal Plan and handout for serving sizes, smarter snacking, Ready Set Carb Counting / Plate Method, Nutrient Conversion and International Diabetes [address] Eating for People with Diabetes and Nutrition in the [employer] - fast facts about fast food and \"How to Thrive: A guide for Your journey with Diabetes\" - ADA booklet 2020  - pages 12 -176/29/21 JW    [] Self-Management  Class 3 -   \"How to Thrive: A guide for Your Journey with Diabetes\"  pages 6- 9 &  21 - 28,  type 2 diabetes and the role of GLP- 1,  Individualized Diabetes report card     [x] Self-Management Class 4 - BD Booklet  Sick Day Rules and  Dinning Out Guide , recipe hand outs and tips, diabetes Cookbooks  ( when available), & \"How to Thrive: A guide for Your journey with Diabetes\" - ADA booklet 2020  - pages 39 -39 7/20/21 JW    []Self-Management - 3 month follow -  AADE7 Self care behaviors work sheets,  Online resource list - March 2020 ,  How to Thrive: A guide for Your journey with Diabetes\" - ADA booklet 2020  - pages 39. []Self-Management  Gestational - RN class -Resource materials sent out : care booklet - \" Gestational Diabetes Mellitus ( GDM) toolkit form [place] gestational diabetes postpartum care learning collaborative 2018. \"Simple Guidelines for meal planning with gestational diabetes. SMBG sheets to fax back to [facility] weekly. BD  healthy injection site selection and rotation with 6 mm insulin syringe and 4 mm pen needle. Gestational diabetes handout from [facility] 2016. Did you have gestational diabetes when you were pregnant?  Handout from [facility]  April 2014    []Self-Management Gestational - RD class - My Food Plan for Gestational diabetes    []Glucose Meter     []Insulin Kit     []Other      Encounter Type Date Start Time End Time Comments No Show Dates   Assessment   6-21-21 BB 3pm 4pm   []In Person  [x]Telephone    Class 1 - Understanding diabetes     []TelephoneAmerican Diabetes Association  www. diabetes. org    Class 2- Nutrition and diabetes   6/29/21 JW 3:00 4:00 [x]Telephone  Healthy Eating with Diabetes- Automatic Data of Diabetes and Digestive and Kidney   https://[email or web address] be/[email or web address]    Class 3 - Preventing Complications     []Telephone    Class 4 -  In depth Nutrition and sick day care 7/20/21 JW 11:30 12:30 [x]Telephone encouraged pt to call with further questions. Diabetes Food hub  www. diabetesY'allb.org     Class 5 - 3 month follow up / goal reassessment        Gestational - RN         Gestational - RD        Individual MNT         Shared Med Appt         Yearly Follow-up        Meter Instrx      How to Measure Your Blood Sugar - [facility] Patient Education  https://[email or web address] be/[email or web address]    Insulin Instrx      []Pen  []Vial & Syringe   BD Diabetes Care: How to Inject Insulin with a Pen Needle  https://[email or web address] be/[email or web address]    Diabetes Care: How to Inject Insulin with a Syringe  [email or web address] be/[email or web address]       DSMS Support :   [] MNT      [] Annual update     [] Starting Fresh  adults living with diabetes or pre diabetes. [address] [place] [phone] call for dates    []  Diabetes Group at  [address] [place] - Free 6 week diabetes education support   classes - use web site interest form found at  [employer].pt - to enroll       []ADA  Where do I Begin, Living with Type 2 diabetes ADA home support program  Web site: diabetes. org/living    Call: 1800 DIABETES  e-mail: Cece@[email or web address]. org     []  Internet web sites - ADAWeb site: diabetes. org and diabetesfoodhub.org      Post Education Referrals: [] [place] Tobacco Quit information sheet and [address] [facility]       [] Dental care - [facility]     [] [address] link  phone number - for information and referral to [address], WOUND, WEIGHT MANAGEMENT        []Other  Sparkle Avendano RD, LD

## 2021-09-01 ENCOUNTER — HOSPITAL ENCOUNTER (OUTPATIENT)
Age: 61
Discharge: HOME | End: 2021-09-01
Payer: MEDICARE

## 2021-09-01 ENCOUNTER — OFFICE VISIT (OUTPATIENT)
Dept: GASTROENTEROLOGY | Age: 61
End: 2021-09-01
Payer: MEDICARE

## 2021-09-01 ENCOUNTER — HOSPITAL ENCOUNTER (OUTPATIENT)
Dept: CT IMAGING | Age: 61
Discharge: HOME | End: 2021-09-03
Payer: MEDICARE

## 2021-09-01 VITALS — DIASTOLIC BLOOD PRESSURE: 81 MMHG | WEIGHT: 196 LBS | BODY MASS INDEX: 28.53 KG/M2 | SYSTOLIC BLOOD PRESSURE: 126 MMHG

## 2021-09-01 DIAGNOSIS — R76.8 HEPATITIS C ANTIBODY POSITIVE IN BLOOD: ICD-10-CM

## 2021-09-01 DIAGNOSIS — K57.92 DIVERTICULITIS: Primary | ICD-10-CM

## 2021-09-01 DIAGNOSIS — R59.0 MEDIASTINAL ADENOPATHY: ICD-10-CM

## 2021-09-01 LAB
ALBUMIN SERPL-MCNC: 4.9 G/DL (ref 3.5–5.2)
ALBUMIN/GLOBULIN RATIO: 1.8 (ref 1–2.5)
ALP BLD-CCNC: 66 U/L (ref 40–129)
ALT SERPL-CCNC: 25 U/L (ref 5–41)
AST SERPL-CCNC: 20 U/L
BILIRUB SERPL-MCNC: 0.4 MG/DL (ref 0.3–1.2)
BILIRUBIN DIRECT: 0.11 MG/DL
BILIRUBIN, INDIRECT: 0.29 MG/DL (ref 0–1)
GLOBULIN: NORMAL G/DL (ref 1.5–3.8)
TOTAL PROTEIN: 7.7 G/DL (ref 6.4–8.3)

## 2021-09-01 PROCEDURE — G8417 CALC BMI ABV UP PARAM F/U: HCPCS | Performed by: INTERNAL MEDICINE

## 2021-09-01 PROCEDURE — G8427 DOCREV CUR MEDS BY ELIG CLIN: HCPCS | Performed by: INTERNAL MEDICINE

## 2021-09-01 PROCEDURE — 2580000003 HC RX 258: Performed by: INTERNAL MEDICINE

## 2021-09-01 PROCEDURE — 6360000004 HC RX CONTRAST MEDICATION: Performed by: INTERNAL MEDICINE

## 2021-09-01 PROCEDURE — 71260 CT THORAX DX C+: CPT

## 2021-09-01 PROCEDURE — 80076 HEPATIC FUNCTION PANEL: CPT

## 2021-09-01 PROCEDURE — 36415 COLL VENOUS BLD VENIPUNCTURE: CPT

## 2021-09-01 PROCEDURE — 3017F COLORECTAL CA SCREEN DOC REV: CPT | Performed by: INTERNAL MEDICINE

## 2021-09-01 PROCEDURE — 87522 HEPATITIS C REVRS TRNSCRPJ: CPT

## 2021-09-01 PROCEDURE — 4004F PT TOBACCO SCREEN RCVD TLK: CPT | Performed by: INTERNAL MEDICINE

## 2021-09-01 PROCEDURE — 99213 OFFICE O/P EST LOW 20 MIN: CPT | Performed by: INTERNAL MEDICINE

## 2021-09-01 RX ORDER — 0.9 % SODIUM CHLORIDE 0.9 %
80 INTRAVENOUS SOLUTION INTRAVENOUS ONCE
Status: COMPLETED | OUTPATIENT
Start: 2021-09-01 | End: 2021-09-01

## 2021-09-01 RX ORDER — SODIUM CHLORIDE 0.9 % (FLUSH) 0.9 %
10 SYRINGE (ML) INJECTION PRN
Status: DISCONTINUED | OUTPATIENT
Start: 2021-09-01 | End: 2021-09-04 | Stop reason: HOSPADM

## 2021-09-01 RX ADMIN — SODIUM CHLORIDE 80 ML: 9 INJECTION, SOLUTION INTRAVENOUS at 14:36

## 2021-09-01 RX ADMIN — SODIUM CHLORIDE, PRESERVATIVE FREE 10 ML: 5 INJECTION INTRAVENOUS at 14:36

## 2021-09-01 RX ADMIN — IOPAMIDOL 75 ML: 755 INJECTION, SOLUTION INTRAVENOUS at 14:36

## 2021-09-01 ASSESSMENT — ENCOUNTER SYMPTOMS
ALLERGIC/IMMUNOLOGIC NEGATIVE: 1
RESPIRATORY NEGATIVE: 1
GASTROINTESTINAL NEGATIVE: 1
EYES NEGATIVE: 1

## 2021-09-01 NOTE — PROGRESS NOTES
GI FOLLOW UP    INTERVAL HISTORY:       Patient is currently deferring colorectal examination, and colonoscopy  Does not appear amenable to any stool test  Liver ultrasound unremarkable    Chief Complaint   Patient presents with    Follow-up     liver ultrasound follow up    Colon Cancer Screening     Patient states for personal reasons he is not ready to have procedure- would not disclose reasons.  Constipation     Patient states since starting lipitor he has noticed being constipated. States he doesn't feel he is fully evacuating. 1. Diverticulitis    2. Hepatitis C antibody positive in blood          HISTORY OF PRESENT ILLNESS: Mr.Dennis RIMA James is a 63 y.o. male with a past history remarkable for arthritis of hips, emphysema, COPD, referred for evaluation of chronic HCV and colonoscopy evaluation.        Smoker: active smoker, 1 ppd x 40 yrs. Drinking history: heavy in the past <7698   Illicit drugs: Marijuana  Abdominal surgeries: None  Prior Colonoscopy: 5 yrs ago, Jan. 2016, [facility]. Prior EGD: None   FH of GI issues: Father- DM, Mother-CAD    Past Medical,Family, and Social History reviewed and does contribute to the patient presenting condition. Patient's PMH/PSH,SH,PSYCH Hx, MEDs, ALLERGIES, and ROS were all reviewed and updated in the appropriate sections. PAST MEDICAL HISTORY:  Past Medical History:   Diagnosis Date    Primary osteoarthritis of both hips     Primary osteoarthritis of left hip 6/22/2017    Seasonal allergies        No past surgical history on file.     CURRENT MEDICATIONS:    Current Outpatient Medications:     fluticasone-salmeterol (WIXELA INHUB) 250-50 MCG/DOSE AEPB, Inhale 1 puff into the lungs every 12 hours, Disp: 60 each, Rfl: 3    vitamin D (ERGOCALCIFEROL) 1.25 MG (55360 UT) CAPS capsule, Take 1 capsule by mouth once a week, Disp: 12 capsule, Rfl: 1    atorvastatin (LIPITOR) 20 MG tablet, Take 1 tablet by mouth daily, Disp: 30 tablet, Rfl: 5    aspirin 81 MG EC tablet, Take 81 mg by mouth daily, Disp: , Rfl:     albuterol sulfate HFA (VENTOLIN HFA) 108 (90 Base) MCG/ACT inhaler, Inhale 2 puffs into the lungs 4 times daily as needed for Wheezing, Disp: 1 Inhaler, Rfl: 0    ALLERGIES:   No Known Allergies    FAMILY HISTORY:       Problem Relation Age of Onset    Coronary Art Dis Mother     Other Mother         ulcers    Dementia Mother     Heart Attack Father     Diabetes Brother     Colon Cancer Neg Hx     Breast Cancer Neg Hx     Prostate Cancer Neg Hx          SOCIAL HISTORY:   Social History     Socioeconomic History    Marital status: Single     Spouse name: Not on file    Number of children: Not on file    Years of education: Not on file    Highest education level: Not on file   Occupational History    Not on file   Tobacco Use    Smoking status: Current Every Day Smoker     Packs/day: 1.00     Years: 44.00     Pack years: 44.00     Types: Cigarettes     Start date: 1/1/1976    Smokeless tobacco: Never Used   Vaping Use    Vaping Use: Never used   Substance and Sexual Activity    Alcohol use: Yes     Comment: occassionly     Drug use: Not Currently     Comment: used to smoke marijuana    Sexual activity: Not Currently     Partners: Female     Comment: had 80 female partners lifetime--has not been sexually active since 2017   Other Topics Concern    Not on file   Social History Narrative    Not on file     Social Determinants of Health     Financial Resource Strain: Low Risk     Difficulty of Paying Living Expenses: Not hard at all   Food Insecurity: No Food Insecurity    Worried About Running Out of Food in the Last Year: Never true    Tiffani of Food in the Last Year: Never true   Transportation Needs:     Lack of Transportation (Medical):      Lack of Transportation (Non-Medical):    Physical Activity:     Days of Exercise per Week:     Minutes of Exercise per Session:    Stress:     Feeling of Stress :    Social Connections:     Frequency of Communication with Friends and Family:     Frequency of Social Gatherings with Friends and Family:     Attends Buddhist Services:     Active Member of Clubs or Organizations:     Attends Club or Organization Meetings:     Marital Status:    Intimate Partner Violence:     Fear of Current or Ex-Partner:     Emotionally Abused:     Physically Abused:     Sexually Abused:        REVIEW OF SYSTEMS: A 12-point review of systems was obtained and pertinent positives and negatives were listed below. REVIEW OF SYSTEMS:     Constitutional: No fever, no chills, no lethargy, no weakness. HEENT:  No headache, otalgia, itchy eyes, nasal discharge or sore throat. Cardiac:  No chest pain, dyspnea, orthopnea or PND. Chest:   No cough, phlegm or wheezing. Abdomen:      Detailed by MA   Neuro:  No focal weakness, abnormal movements or seizure like activity. Skin:   No rashes, no itching. :   No hematuria, no pyuria, no dysuria, no flank pain. Extremities:  No swelling or joint pains. ROS was otherwise negative    Review of Systems   Constitutional: Negative. HENT: Negative. Eyes: Negative. Respiratory: Negative. Cardiovascular: Negative. Gastrointestinal: Negative. Endocrine: Negative. Genitourinary: Negative. Musculoskeletal: Negative. Skin: Negative. Allergic/Immunologic: Negative. Neurological: Negative. Hematological: Negative. Psychiatric/Behavioral: Negative. All other systems reviewed and are negative. PHYSICAL EXAMINATION: Vital signs reviewed per the nursing documentation. /81   Wt 196 lb (88.9 kg)   BMI 28.53 kg/m²   Body mass index is 28.53 kg/m². Physical Exam    Physical Exam   Constitutional: Patient is oriented to person, place, and time. Patient appears well-developed and well-nourished.    HENT: Head: Normocephalic and atraumatic. Eyes: Pupils are equal, round, and reactive to light. EOM are normal.   Neck: Normal range of motion. Neck supple. No JVD present. No tracheal deviation present. No thyromegaly present. Cardiovascular: Normal rate, regular rhythm, normal heart sounds and intact distal pulses. Pulmonary/Chest: Effort normal and breath sounds normal. No stridor. No respiratory distress. He has no wheezes. He has no rales. He exhibits no tenderness. Abdominal: Soft. Bowel sounds are normal. He exhibits no distension and no mass. There is no tenderness. There is no rebound and no guarding. No hernia. Musculoskeletal: Normal range of motion. Lymphadenopathy:    Patient has no cervical adenopathy. Neurological: Patient is alert and oriented to person, place, and time. Psychiatric: Patient has a normal mood and affect. Patient behavior is normal.       LABORATORY DATA: Reviewed  Lab Results   Component Value Date    WBC 9.8 05/21/2021    HGB 16.3 05/21/2021    HCT 53.0 (H) 05/21/2021    MCV 93.0 05/21/2021     05/21/2021     06/18/2021    K 4.2 06/18/2021     06/18/2021    CO2 23 06/18/2021    BUN 13 06/18/2021    CREATININE 0.82 06/18/2021    LABALBU 4.4 06/18/2021    BILITOT 0.66 06/18/2021    ALKPHOS 65 06/18/2021    AST 16 06/18/2021    ALT 22 06/18/2021         Lab Results   Component Value Date    RBC 5.70 05/21/2021    HGB 16.3 05/21/2021    MCV 93.0 05/21/2021    MCH 28.6 05/21/2021    MCHC 30.8 05/21/2021    RDW 14.6 (H) 05/21/2021    MPV 10.8 05/21/2021    BASOPCT 1 05/21/2021    LYMPHSABS 2.86 05/21/2021    MONOSABS 0.68 05/21/2021    NEUTROABS 5.81 05/21/2021    EOSABS 0.39 05/21/2021    BASOSABS 0.06 05/21/2021         DIAGNOSTIC TESTING:     No results found.            IMPRESSION: Mr.Dennis RIMA Thornton is a 63 y.o. male with a past history remarkable for arthritis of hips, emphysema, COPD, referred for evaluation of chronic HCV and colonoscopy evaluation. Assessment  1. Diverticulitis    2. Hepatitis C antibody positive in blood        PLAN:    1) Chronic HCV antibody positive result --- viral load in May was negative. Will repeat viral load to evaluate for any changes. We will repeat LFTs. Difficult to obtain approval for DAA if no detectable viral load. Liver ultrasound is unremarkable aside from mild fatty infiltration. 2) colorectal cancer screeningpatient is deferring colonoscopy. Risk of deferring at refusal explained to the patient which includes missing a precancerous or cancerous lesion. He understood with full capacity    3) follow-up in 2 months. Thank you for allowing me to participate in the care of Mr. Clementina Burnett. For any further questions please do not hesitate to contact me. I have reviewed and agree with the ROS entered by the MA/LPN from today's encounter documented in a separate note. Elias Vela MD, MPH   [facility]  Office #: [phone]    this note is created with the assistance of a speech recognition program.  While intending to generate a document that actually reflects the content of the visit, the document can still have some errors including those of syntax and sound a like substitutions which may escape proof reading. It such instances, actual meaning can be extrapolated by contextual diversion.

## 2021-09-02 LAB
DIRECT EXAM: NORMAL
Lab: NORMAL
SPECIMEN DESCRIPTION: NORMAL

## 2021-09-29 ENCOUNTER — OFFICE VISIT (OUTPATIENT)
Dept: PULMONOLOGY | Age: 61
End: 2021-09-29
Payer: MEDICARE

## 2021-09-29 VITALS
OXYGEN SATURATION: 97 % | RESPIRATION RATE: 15 BRPM | BODY MASS INDEX: 29.62 KG/M2 | DIASTOLIC BLOOD PRESSURE: 89 MMHG | WEIGHT: 200 LBS | SYSTOLIC BLOOD PRESSURE: 139 MMHG | HEIGHT: 69 IN | TEMPERATURE: 97.3 F | HEART RATE: 86 BPM

## 2021-09-29 DIAGNOSIS — Z23 ENCOUNTER FOR IMMUNIZATION: ICD-10-CM

## 2021-09-29 DIAGNOSIS — Z72.0 TOBACCO ABUSE: ICD-10-CM

## 2021-09-29 DIAGNOSIS — R59.0 MEDIASTINAL ADENOPATHY: Primary | ICD-10-CM

## 2021-09-29 DIAGNOSIS — J44.9 COPD, SEVERITY TO BE DETERMINED (HCC): ICD-10-CM

## 2021-09-29 PROCEDURE — 4004F PT TOBACCO SCREEN RCVD TLK: CPT | Performed by: INTERNAL MEDICINE

## 2021-09-29 PROCEDURE — G8427 DOCREV CUR MEDS BY ELIG CLIN: HCPCS | Performed by: INTERNAL MEDICINE

## 2021-09-29 PROCEDURE — 90471 IMMUNIZATION ADMIN: CPT | Performed by: INTERNAL MEDICINE

## 2021-09-29 PROCEDURE — 99214 OFFICE O/P EST MOD 30 MIN: CPT | Performed by: INTERNAL MEDICINE

## 2021-09-29 PROCEDURE — G8417 CALC BMI ABV UP PARAM F/U: HCPCS | Performed by: INTERNAL MEDICINE

## 2021-09-29 PROCEDURE — G8926 SPIRO NO PERF OR DOC: HCPCS | Performed by: INTERNAL MEDICINE

## 2021-09-29 PROCEDURE — 90674 CCIIV4 VAC NO PRSV 0.5 ML IM: CPT | Performed by: INTERNAL MEDICINE

## 2021-09-29 PROCEDURE — 3017F COLORECTAL CA SCREEN DOC REV: CPT | Performed by: INTERNAL MEDICINE

## 2021-09-29 PROCEDURE — 3023F SPIROM DOC REV: CPT | Performed by: INTERNAL MEDICINE

## 2021-09-29 RX ORDER — NICOTINE 21 MG/24HR
1 PATCH, TRANSDERMAL 24 HOURS TRANSDERMAL DAILY
Qty: 14 PATCH | Refills: 5 | Status: SHIPPED | OUTPATIENT
Start: 2021-09-29 | End: 2021-12-28 | Stop reason: SDUPTHER

## 2021-09-29 NOTE — PATIENT INSTRUCTIONS
Patient to call central scheduling at [phone] to schedule PET scan dated 3/1/22; printed AVS for patient  guillermo

## 2021-09-29 NOTE — PROGRESS NOTES
PULMONARY MEDICINE OUTPATIENT  FOLLOW UP NOTE                                                                       PATIENT:  Terell Hill OF BIRTH: 1960  MRN: [id number]     REFERRED BY: Shashi Maldonado MD   CHIEF COMPLIANT: COPD (follow up)       HISTORY     HISTORY OF PRESENT ILLNESS:   Pramod Issa is a 63y.o. year old male here for follow up    COPD/Tobacco abuse/mediastinal adenopathy:  Patient  reports that he has been smoking cigarettes. He started smoking about 45 years ago. He has a 44.00 pack-year smoking history. He has never used smokeless tobacco.  He has chronic cough and sputum. PFT not done yet. Patient on Wixela and as needed albuterol. He underwent a lung cancer screening CT (6/1/21) and was noted to have 1.2 cm precarinal lymphadenopathy. He is returning after repeat which is reported to show slight decrease in the size of precarinal LN. Patient also has subcarinal LN (not reported) which is also stable in appearance. PAST MEDICAL HISTORY:      Diagnosis Date    Primary osteoarthritis of both hips     Primary osteoarthritis of left hip 6/22/2017    Seasonal allergies      PAST SURGICAL HISTORY:  History reviewed. No pertinent surgical history. SOCIAL HISTORY:  TOBACCO:   reports that he has been smoking cigarettes. He started smoking about 45 years ago. He has a 44.00 pack-year smoking history. He has never used smokeless tobacco.  ETOH:   reports current alcohol use. DRUGS: reports previous drug use.      AVOCATION/OCCUPATIONAL EXPOSURE:  [] Asbestos      [] Hot tub  [] Silica dust    [] Pets  [] Coal            [] Exotic birds  [] [employer]       [] Tuberculosis  [] Kamila Sham         [] Others  [] [place]          PHYSICAL EXAMINATION      VITAL SIGNS:   /89 (Site: Left Upper Arm, Position: Sitting)   Pulse 86   Temp 97.3 °F (36.3 °C) (Temporal)   Resp 15   Ht 5' 9\" (1.753 m)   Wt 200 lb (90.7 kg)   SpO2 97%   BMI 29.53 kg/m²   Wt Readings from Last 3 Encounters:   09/29/21 200 lb (90.7 kg)   09/01/21 196 lb (88.9 kg)   06/30/21 199 lb (90.3 kg)     SYSTEMIC EXAMINATION:   General appearance -  [x] well appearing  [x] overweight  [] obese [] cachectic [x] comfortable  [x] in no acute distress  [] chronically ill-appearing  [] in mild to moderate respiratory distress   Mental status -  [x] alert  [x] oriented to person, place, and time  [] anxious  [] depressed mood    Head-  [x] atraumatic  [x] normocephalic   Eyes -  [] pupils equal and reactive, extraocular eye movements intact  [] sclera anicteric   Ears -  [x] hearing grossly normal bilaterally [] bilateral TM's and external ear canals normal   Nose -  [x] normal and patent [] no erythema  [] discharge   Mouth -  [x] mucous membranes moist  [] pharynx normal without lesions [] erythematous  [] exudate noted   Mallampati Airway Score -  [] I (soft palate, uvula, fauces, tonsillar pillars visible) [] II (soft palate, uvula, fauces visible) []  III (soft palate, base of uvula visible) [] IV (only hard palate visible)   Neck -  [] supple  [] no significant adenopathy  [] carotids upstroke normal bilaterally [] no JVD  [] no bruit   Lymphatics -  [x] no palpable lymphadenopathy  [] no hepatosplenomegaly   Chest -   [x] normal chest excursion  [x] no chest wall tenderness  [] increased AP diameter[] pectus noted [] scoliosis noted [x] no tachypnea retraction or cyanosis [x] clear to auscultation, no wheezes, rales or rhonchi, symmetric air entry  [] wheezing noted  [] rales noted  []rhonchi noted [] decreased air entry noted bilaterally   Heart -  [x] normal rate,  [x] regular rhythm  [] irregularly irregular rhythm [x] normal S1  [x] normal S2  [x] no murmurs, rubs, clicks or gallops  [] S3 present  [] S4 present  [] systolic murmur  [] diastolic murmur  [] midsystolic click []pericardial rub present    Abdomen -  [] soft [] nontender  [] nondistended  [] no masses or organomegaly   Neurological - [x] normal speech  [x] no focal findings or movement disorder noted  [] cranial nerves II through XII intact  [] DTR's normal and symmetric  [] Babinski sign negative,  [x] motor and sensory grossly normal bilaterally  [] normal muscle tone [x] no tremors  [] strength 5/5  [] Romberg sign negative [] normal gait    Musculoskeletal -  [x] no joint tenderness [x] no deformity or swelling   Extremities - [x] no clubbing or cyanosis,  [x] no pedal edema [] pedal edema  [] intact peripheral pulses   Skin -  [x] normal coloration and turgor  [x] no rashes  [] no suspicious skin lesions noted          MEDICAL DECISION MAKING     1. PROBLEMS ADDRESSED (NUMBER AND COMPLEXITY)       ICD-10-CM    1. Mediastinal adenopathy  R59.0 PET CT SKULL BASE TO MID THIGH   2. Encounter for immunization  Z23 INFLUENZA, MDCK QUADV, 2 YRS AND OLDER, IM, PF, PREFILL SYR OR SDV, 0.5ML (FLUCELVAX QUADV, PF)     CANCELED: INFLUENZA, QUADV, 3 YRS AND OLDER, IM PF, PREFILL SYR OR SDV, 0.5ML (AFLURIA QUADV, PF)   3. Tobacco abuse  Z72.0 nicotine (NICODERM CQ) 14 MG/24HR   4. COPD, severity to be determined ([address]  J44.9         2.  DATA REVIEWED AND ANALYZED (AMOUNT AND/OR COMPLEXITY)   LABS:  ABG:   No results found for: PH, PHART, PCO2, NAL1QON, PO2, PO2ART, HCO3, LRQ6ABY, BE, BEART, THGB, F6YOALDC  VBG:  No results found for: PHVEN, VUG6LJH, BEVEN, S7RXGSGY  CBC:   Lab Results   Component Value Date    WBC 9.8 05/21/2021    RBC 5.70 05/21/2021    HGB 16.3 05/21/2021    HCT 53.0 (H) 05/21/2021     05/21/2021    MCV 93.0 05/21/2021    MCH 28.6 05/21/2021    MCHC 30.8 05/21/2021    RDW 14.6 (H) 05/21/2021    LYMPHOPCT 29 05/21/2021    MONOPCT 7 05/21/2021    BASOPCT 1 05/21/2021    MONOSABS 0.68 05/21/2021    LYMPHSABS 2.86 05/21/2021    EOSABS 0.39 05/21/2021    BASOSABS 0.06 05/21/2021    DIFFTYPE NOT REPORTED 05/21/2021     Allergen Panel:No results found for: REGVALL  Eosinophils/IgE:   Lab Results   Component Value Date    EOSABS 0.39 05/21/2021     Alpha 1 antitrypsin: No results found for: A1A  CMP:   Lab Results   Component Value Date     06/18/2021    K 4.2 06/18/2021     06/18/2021    CO2 23 06/18/2021    BUN 13 06/18/2021    CREATININE 0.82 06/18/2021    CALCIUM 9.2 06/18/2021    PROT 7.7 09/01/2021    LABALBU 4.9 09/01/2021    BILITOT 0.40 09/01/2021    BILIDIR 0.11 09/01/2021    IBILI 0.29 09/01/2021    ALT 25 09/01/2021    AST 20 09/01/2021    ALKPHOS 66 09/01/2021     Coagulation Profile:   No results found for: INR, PROTIME, APTT  BNP:   No results found for: BNP, PROBNP  D-Dimer/Fibrinogen:  No results found for: DDIMER  Others labs:  Lab Results   Component Value Date    TSH 1.04 05/21/2021     Lab Results   Component Value Date    MPO <0.3 06/18/2021    PR3 <0.7 06/18/2021    SEDRATE 25 (H) 06/18/2021    CRP 19.4 (H) 06/18/2021     Lab Results   Component Value Date     06/18/2021     No results found for: SPEP, UPEP, PSA, CEA, , SR3109,   No results found for: RPR, HIV    RADIOLOGY:  [] Ordered  [] Unavailable  [x] Reviewed  CT CHEST W CONTRAST  Result Date: 9/1/2021  FINDINGS: Mediastinum: Precarinal lymph node is redemonstrated now measuring 11 mm, minimally enlarged. No other enlarged lymph nodes are present. No acute aortic abnormality, cardiomegaly, pericardial effusion or epicardial adenopathy is noted. Minimal calcification of the coronary arteries is noted. Lungs/pleura: Mild emphysematous changes are present in the lungs. No areas of consolidation effusion or extrapleural air are noted. No significant nodules are present. Tracheobronchial tree is patent. Upper Abdomen: No acute abnormality in the included upper abdomen. However, the liver is fatty infiltrated. Soft Tissues/Bones: No acute osseous or soft tissue abnormality. No axillary adenopathy. Multilevel minimal degenerative changes are noted.      1.  Slightly prominent precarinal lymph node is again demonstrated now measuring 11 mm previously 12 mm. 2.  Mild emphysematous changes. No acute pulmonary findings. No noncalcified solid pulmonary nodules. 3.  Hepatic steatosis. CT chest 6/1/2021  FINDINGS:   Mediastinum:  Mild mediastinal adenopathy is noted with a 12 mm precarinal   lymph node present.  No acute aortic abnormality, cardiomegaly, pericardial   effusion or epicardial adenopathy is noted.  Coronary artery calcification is   demonstrated.       Lungs/Pleura:  Mild emphysematous changes are present.  Basilar atelectasis   is noted.  No noncalcified solid pulmonary nodules, areas of consolidation or   effusion are noted.       Upper Abdomen:  No acute abnormality in the included upper abdominal   structures.  Adrenals are unremarkable.       Soft Tissues/Bones: No acute osseous or soft tissue abnormality.  Minimal   spondylosis is present in the spine.           Impression   1.  Mild emphysematous changes.       2.  No solid noncalcified lung nodules.       3.  Prominent mediastinal lymph node.         ECHOCARDIOGRAM:  No results found for this or any previous visit. PULMONARY FUNCTION TEST:  [] Ordered  [x] Unavailable  [] Reviewed  No results found for: FEV1, FVC, XKL2HRW, TLC, DLCO   No results found for this or any previous visit. 6 MINUTE WALK TEST:  [] Ordered  [x] Unavailable  [] Reviewed  No results found for this or any previous visit. Distance Walk Predicted Distance LLN** Predicted % Duration (min) Duration of Stops Sec Karine Dyspnea Karine Fatigue               PRIOR EXTERNAL NOTES:  [] Unavailable  [x] Reviewed    ORDERS PLACED:  Orders Placed This Encounter   Procedures    PET CT SKULL BASE TO MID THIGH     Standing Status:   Future     Standing Expiration Date:   9/29/2022     Order Specific Question:   Reason for exam:     Answer:   medistinal lymphadenopathy FU    INFLUENZA, MDCK QUADV, 2 YRS AND OLDER, IM, PF, PREFILL SYR OR SDV, 0.5ML (FLUCELVAX QUADV, PF)        3.  RISK OF COMPLICATIONS AND/OR MORBIDITY OR MORTALITY:     ALLERGIES:  No Known Allergies   MEDICATIONS:  Outpatient Medications Prior to Visit   Medication Sig Dispense Refill    fluticasone-salmeterol (WIXELA INHUB) 250-50 MCG/DOSE AEPB Inhale 1 puff into the lungs every 12 hours 60 each 3    vitamin D (ERGOCALCIFEROL) 1.25 MG (38221 UT) CAPS capsule Take 1 capsule by mouth once a week 12 capsule 1    atorvastatin (LIPITOR) 20 MG tablet Take 1 tablet by mouth daily 30 tablet 5    aspirin 81 MG EC tablet Take 81 mg by mouth daily      albuterol sulfate HFA (VENTOLIN HFA) 108 (90 Base) MCG/ACT inhaler Inhale 2 puffs into the lungs 4 times daily as needed for Wheezing 1 Inhaler 0     No facility-administered medications prior to visit. PRESCRIPTION DRUG MANAGEMENT/RECOMMENDATIONS:   PFT pending   CT findings reviewed and discussed with patient   Ordered a PET chest in 6 months to reevaluate precarinal lymph node   Continue Wixela and as needed albuterol   Reviewed use of rescue vs controlling agents and potential side effects.  Reviewed use, techniques, schedule and side effects of all inhaled medications.  Refills were provided as requested.  Barriers to medication compliance addressed.  Patient to have prednisone and an antibiotic available for use during an exacerbation. SUPPLEMENTAL OXYGEN/NIV RECOMMENDATIONS:   Patient is not on home oxygen therapy. SMOKING CESSATION RECOMMENDATIONS/COUNSELING:  Jeff Adair was instructed on smoking cessation for greater than 10 minutes. I discussed with this patient today the risks and benefits of various tobacco cessation strategies, including, but not limited to \"cold turkey,\" nicotine replacement including the electronic cigarette, zyban ( wellbutrin ) & Chantix. We specifically discussed the risks of Chantix and zyban with focus on side effects to include nausea, and intense dreams.  Jeff Adair has chosen to try Nicotine patches    LIFESTYLE MODIFICATION RECOMMENDATIONS/COUNSELING:   Follow healthy behaviors: nutrition, exercise and medication adherence.  Maintain an active lifestyle. LUNG CANCER SCREENING/OTHER IMAGING RECOMMENDATIONS:   After reviewing the patient's smoking history, age and other clinical criteria, the LOW DOSE CT is : NOT INDICATED; Recent CT within 11 months     IMMUNIZATION HISTORY/RECOMMENDATIONS:    Immunization History   Administered Date(s) Administered    COVID-19, Pfizer, PF, 30mcg/0.3mL 07/01/2021, 08/29/2021    Influenza, MDCK Quadv, IM, PF (Flucelvax 2 yrs and older) 09/29/2021    Influenza, Hassel Scarce, Recombinant, IM PF (Flublok 18 yrs and older) 10/12/2019    Pneumococcal Polysaccharide ([id number]) 05/20/2021    Tdap (Boostrix, Adacel) 01/01/2016      Recommend influenza vaccination in the fall annually; ordered    Recommendations were given regarding pneumococcal vaccination; Up-to-date   Recommendations were given regarding COVID-19 vaccination; Up-to-date    All the questions that the patient had were answered to his satisfaction  We'll see the patient back in 6 months  Thank you for having us involved in the care of your patient. Please call us if you have any questions or concerns. Ta Santos MD  Pulmonary and Critical Care Medicine            Please note that this chart was generated using voice recognition Dragon dictation software. Although every effort was made to ensure the accuracy of this automated transcription, some errors in transcription may have occurred.

## 2021-11-16 DIAGNOSIS — E78.00 ELEVATED LDL CHOLESTEROL LEVEL: ICD-10-CM

## 2021-11-16 DIAGNOSIS — E55.9 VITAMIN D DEFICIENCY: ICD-10-CM

## 2021-11-17 RX ORDER — ERGOCALCIFEROL 1.25 MG/1
CAPSULE ORAL
Qty: 4 CAPSULE | Refills: 2 | Status: SHIPPED | OUTPATIENT
Start: 2021-11-17 | End: 2022-02-21 | Stop reason: SDUPTHER

## 2021-11-17 RX ORDER — ATORVASTATIN CALCIUM 20 MG/1
TABLET, FILM COATED ORAL
Qty: 30 TABLET | Refills: 5 | Status: SHIPPED | OUTPATIENT
Start: 2021-11-17 | End: 2022-05-23 | Stop reason: SDUPTHER

## 2021-11-17 NOTE — TELEPHONE ENCOUNTER
Electronic medication refill request. Pharmacy on file. Please advise.         Next Visit Date:  Future Appointments   Date Time Provider [place] Sanchez   12/1/2021  1:30 PM Ehsan Bansal MD [id number]   3/2/2022  2:30 PM Onesimo Maldonado MD [place] [facility]   Topic Date Due    Colon cancer screen colonoscopy  Never done    Shingles Vaccine (1 of 2) Never done    COVID-19 Vaccine (3 - Booster for Pfizer series) 02/28/2022    A1C test (Diabetic or Prediabetic)  05/26/2022    Lipid screen  06/18/2022    Low dose CT lung screening  09/01/2022    DTaP/Tdap/Td vaccine (2 - Td or Tdap) 01/01/2026    Pneumococcal 0-64 years Vaccine (2 of 2 - PPSV23) 06/09/2027    Flu vaccine  Completed    Hepatitis C screen  Completed    HIV screen  Completed    Hepatitis A vaccine  Aged Out    Hepatitis B vaccine  Aged Out    Hib vaccine  Aged Out    Meningococcal (ACWY) vaccine  Aged Out       Hemoglobin A1C (%)   Date Value   05/26/2021 6.3 (H)             ( goal A1C is < 7)   No results found for: LABMICR  LDL Cholesterol (mg/dL)   Date Value   06/18/2021 90   05/21/2021 148 (H)       (goal LDL is <100)   AST (U/L)   Date Value   09/01/2021 20     ALT (U/L)   Date Value   09/01/2021 25     BUN (mg/dL)   Date Value   06/18/2021 13     BP Readings from Last 3 Encounters:   09/29/21 139/89   09/01/21 126/81   06/30/21 (!) 144/78          (goal 120/80)    All Future Testing planned in [facility]  Lab Frequency Next Occurrence   COLONOSCOPY (Screening) Once 06/29/2021   Spirometry With Bronchodilator Once 06/17/2021   PET CT SKULL BASE TO MID THIGH Once 03/01/2022               Patient Active Problem List:     Primary osteoarthritis of left hip

## 2021-11-29 ENCOUNTER — TELEPHONE (OUTPATIENT)
Dept: GASTROENTEROLOGY | Age: 61
End: 2021-11-29

## 2021-11-29 NOTE — TELEPHONE ENCOUNTER
Yasmeen Mckay called stating he received a reminder for his appt. with Dr. Kiera Parsons on 12/1/2021. He has not had testing done yet and wonders if he should reschedule. The testing was scheduled but then cancelled by provider due to illness. Please advise, Yasmeen Mckay would like a call back.

## 2021-11-30 NOTE — TELEPHONE ENCOUNTER
Spoke with patient. Cancelled tomorrows appt. States he will see PCP end of December to get his living will set up. Informed patient I will put a recall in for mid-January to schedule procedure. Patient was agreeable and thanked writer.

## 2021-12-19 DIAGNOSIS — J43.8 OTHER EMPHYSEMA (HCC): Chronic | ICD-10-CM

## 2021-12-19 DIAGNOSIS — F17.200 CURRENT SMOKER: ICD-10-CM

## 2021-12-20 DIAGNOSIS — J43.8 OTHER EMPHYSEMA (HCC): Chronic | ICD-10-CM

## 2021-12-20 DIAGNOSIS — F17.200 CURRENT SMOKER: ICD-10-CM

## 2021-12-20 RX ORDER — ALBUTEROL SULFATE 90 UG/1
AEROSOL, METERED RESPIRATORY (INHALATION)
Qty: 8.5 G | Refills: 1 | Status: CANCELLED | OUTPATIENT
Start: 2021-12-20

## 2021-12-20 RX ORDER — ALBUTEROL SULFATE 90 UG/1
AEROSOL, METERED RESPIRATORY (INHALATION)
Qty: 8.5 G | Refills: 1 | Status: SHIPPED | OUTPATIENT
Start: 2021-12-20 | End: 2022-04-25 | Stop reason: SDUPTHER

## 2021-12-20 NOTE — TELEPHONE ENCOUNTER
Daniel Landa is calling to request a refill on the following medication(s):    Medication Request:  Requested Prescriptions     Pending Prescriptions Disp Refills    albuterol sulfate  (90 Base) MCG/ACT inhaler 8.5 g 1     Sig: INHALE TWO PUFFS BY MOUTH FOUR TIMES A DAY AS NEEDED FOR WHEEZING       Last Visit Date (If Applicable):  8/93/9711    Next Visit Date:    Visit date not found

## 2021-12-20 NOTE — TELEPHONE ENCOUNTER
Requested Prescriptions     Pending Prescriptions Disp Refills    albuterol sulfate  (90 Base) MCG/ACT inhaler [Pharmacy Med Name: ALBUTEROL HFA 90 MCG INHALER] 8.5 g      Sig: INHALE TWO PUFFS BY MOUTH FOUR TIMES A DAY AS NEEDED FOR WHEEZING

## 2021-12-28 ENCOUNTER — OFFICE VISIT (OUTPATIENT)
Dept: FAMILY MEDICINE CLINIC | Age: 61
End: 2021-12-28
Payer: MEDICARE

## 2021-12-28 VITALS
SYSTOLIC BLOOD PRESSURE: 137 MMHG | WEIGHT: 201 LBS | HEART RATE: 85 BPM | DIASTOLIC BLOOD PRESSURE: 79 MMHG | OXYGEN SATURATION: 97 % | TEMPERATURE: 97.3 F | HEIGHT: 69 IN | BODY MASS INDEX: 29.77 KG/M2

## 2021-12-28 DIAGNOSIS — R73.03 PREDIABETES: ICD-10-CM

## 2021-12-28 DIAGNOSIS — R00.2 PALPITATIONS: Primary | ICD-10-CM

## 2021-12-28 DIAGNOSIS — Z72.0 TOBACCO ABUSE: ICD-10-CM

## 2021-12-28 DIAGNOSIS — K40.90 RIGHT INGUINAL HERNIA: ICD-10-CM

## 2021-12-28 PROBLEM — B18.2 CHRONIC HEPATITIS C WITHOUT HEPATIC COMA (HCC): Status: ACTIVE | Noted: 2021-12-28

## 2021-12-28 PROCEDURE — G8482 FLU IMMUNIZE ORDER/ADMIN: HCPCS | Performed by: STUDENT IN AN ORGANIZED HEALTH CARE EDUCATION/TRAINING PROGRAM

## 2021-12-28 PROCEDURE — 4004F PT TOBACCO SCREEN RCVD TLK: CPT | Performed by: STUDENT IN AN ORGANIZED HEALTH CARE EDUCATION/TRAINING PROGRAM

## 2021-12-28 PROCEDURE — 99214 OFFICE O/P EST MOD 30 MIN: CPT | Performed by: STUDENT IN AN ORGANIZED HEALTH CARE EDUCATION/TRAINING PROGRAM

## 2021-12-28 PROCEDURE — G8417 CALC BMI ABV UP PARAM F/U: HCPCS | Performed by: STUDENT IN AN ORGANIZED HEALTH CARE EDUCATION/TRAINING PROGRAM

## 2021-12-28 PROCEDURE — G8427 DOCREV CUR MEDS BY ELIG CLIN: HCPCS | Performed by: STUDENT IN AN ORGANIZED HEALTH CARE EDUCATION/TRAINING PROGRAM

## 2021-12-28 PROCEDURE — 3017F COLORECTAL CA SCREEN DOC REV: CPT | Performed by: STUDENT IN AN ORGANIZED HEALTH CARE EDUCATION/TRAINING PROGRAM

## 2021-12-28 RX ORDER — NICOTINE 21 MG/24HR
1 PATCH, TRANSDERMAL 24 HOURS TRANSDERMAL DAILY
Qty: 30 PATCH | Refills: 1 | Status: SHIPPED | OUTPATIENT
Start: 2021-12-28 | End: 2022-08-16 | Stop reason: SDUPTHER

## 2021-12-28 ASSESSMENT — ENCOUNTER SYMPTOMS
WHEEZING: 0
DIARRHEA: 0
ABDOMINAL PAIN: 0
COUGH: 0
CONSTIPATION: 0
SORE THROAT: 0
NAUSEA: 0
EYE DISCHARGE: 0
CHEST TIGHTNESS: 0
SHORTNESS OF BREATH: 0
VOMITING: 0

## 2021-12-28 NOTE — PROGRESS NOTES
[address] PRIMARY CARE  [address] [place] [address] [address] [facility]  Dept: [phone]  Dept Fax: [phone]    Cesar Farmer is a 63 y.o. male who is a Established patient, who presents today for his medical conditions/complaints as noted below:  Chief Complaint   Patient presents with    3 Month Follow-Up     pre diabetes    Other     anxiety after covid shoot         HPI:     He is here today to follow-up on smoking cessation and anxiety. He says that he has been trying to cut down on smoking and nicotine patches have been helping. He says that he got his second shot of Covid in July and has noticed occasional episodes of palpitations since then. He says that the episodes occur mostly when he is stressed but sometimes he just sitting around or waking up in the morning when they occur. He also has a history of prediabetes for which he is not on any medications. He says that he followed with diabetic education program and has been learning to modify his diet slowly. He has been learning to read labels and cut out sugars from his diet. He also has chronic right inguinal hernia which is slowly getting bigger and he would like to get a surgical evaluation. Hemoglobin A1C (%)   Date Value   05/26/2021 6.3 (H)             ( goal A1Cis < 7)   No results found for: LABMICR  LDL Cholesterol (mg/dL)   Date Value   06/18/2021 90   05/21/2021 148 (H)       (goal LDL is <100)   AST (U/L)   Date Value   09/01/2021 20     ALT (U/L)   Date Value   09/01/2021 25     BUN (mg/dL)   Date Value   06/18/2021 13     BP Readings from Last 3 Encounters:   12/28/21 137/79   09/29/21 139/89   09/01/21 126/81          (goal 120/80)    Past Medical History:   Diagnosis Date    Primary osteoarthritis of both hips     Primary osteoarthritis of left hip 6/22/2017    Seasonal allergies       History reviewed. No pertinent surgical history.     Family History   Problem Relation Age of Onset    Coronary Art Dis Mother     Other Mother         ulcers    Dementia Mother     Heart Attack Father     Diabetes Brother     Colon Cancer Neg Hx     Breast Cancer Neg Hx     Prostate Cancer Neg Hx        Social History     Tobacco Use    Smoking status: Current Every Day Smoker     Packs/day: 1.00     Years: 44.00     Pack years: 44.00     Types: Cigarettes     Start date: 1/1/1976    Smokeless tobacco: Never Used   Substance Use Topics    Alcohol use: Yes     Comment: occassionly       Current Outpatient Medications   Medication Sig Dispense Refill    nicotine (NICODERM CQ) 14 MG/24HR Place 1 patch onto the skin daily 30 patch 1    albuterol sulfate  (90 Base) MCG/ACT inhaler INHALE TWO PUFFS BY MOUTH FOUR TIMES A DAY AS NEEDED FOR WHEEZING 8.5 g 1    vitamin D (ERGOCALCIFEROL) 1.25 MG (33542 UT) CAPS capsule TAKE ONE CAPSULE BY MOUTH ONCE WEEKLY 4 capsule 2    atorvastatin (LIPITOR) 20 MG tablet TAKE ONE TABLET BY MOUTH DAILY 30 tablet 5    fluticasone-salmeterol (WIXELA INHUB) 250-50 MCG/DOSE AEPB Inhale 1 puff into the lungs every 12 hours 60 each 3    aspirin 81 MG EC tablet Take 81 mg by mouth daily       No current facility-administered medications for this visit.      No Known Allergies    Health Maintenance   Topic Date Due    Colon cancer screen colonoscopy  Never done    Shingles Vaccine (1 of 2) Never done    COVID-19 Vaccine (3 - Booster for Pfizer series) 02/28/2022    A1C test (Diabetic or Prediabetic)  05/26/2022    Lipid screen  06/18/2022    Low dose CT lung screening  09/01/2022    DTaP/Tdap/Td vaccine (2 - Td or Tdap) 01/01/2026    Pneumococcal 0-64 years Vaccine (2 of 2 - PPSV23) 06/09/2027    Flu vaccine  Completed    Hepatitis C screen  Completed    HIV screen  Completed    Hepatitis A vaccine  Aged Out    Hepatitis B vaccine  Aged Out    Hib vaccine  Aged Out    Meningococcal (ACWY) vaccine  Aged Out       Subjective:     Review of Systems   Constitutional: Negative for appetite change, fatigue and fever. HENT: Negative for congestion, ear pain, hearing loss and sore throat. Eyes: Negative for discharge and visual disturbance. Respiratory: Negative for cough, chest tightness, shortness of breath and wheezing. Cardiovascular: Negative for chest pain, palpitations and leg swelling. Gastrointestinal: Negative for abdominal pain, constipation, diarrhea, nausea and vomiting. Genitourinary: Negative for flank pain, frequency, hematuria and urgency. Musculoskeletal: Negative for arthralgias, gait problem, joint swelling and myalgias. Skin: Negative. Neurological: Negative for dizziness, weakness, numbness and headaches. Psychiatric/Behavioral: Negative. Negative for dysphoric mood. The patient is not nervous/anxious. Objective:     Physical Exam  Vitals reviewed. Constitutional:       Appearance: Normal appearance. He is normal weight. HENT:      Head: Normocephalic and atraumatic. Nose: Nose normal.      Mouth/Throat:      Mouth: Mucous membranes are moist.      Pharynx: Oropharynx is clear. Eyes:      Extraocular Movements: Extraocular movements intact. Conjunctiva/sclera: Conjunctivae normal.      Pupils: Pupils are equal, round, and reactive to light. Cardiovascular:      Rate and Rhythm: Normal rate and regular rhythm. Heart sounds: Normal heart sounds. No murmur heard. No gallop. Pulmonary:      Effort: Pulmonary effort is normal. No respiratory distress. Breath sounds: Normal breath sounds. No stridor. No wheezing. Abdominal:      General: Bowel sounds are normal. There is no distension. Palpations: Abdomen is soft. Tenderness: There is no abdominal tenderness. There is no guarding or rebound. Musculoskeletal:         General: No swelling or tenderness. Normal range of motion. Cervical back: Normal range of motion and neck supple. Skin:     General: Skin is warm and dry. Coloration: Skin is not jaundiced. Findings: No rash. Neurological:      General: No focal deficit present. Mental Status: He is alert and oriented to person, place, and time. Psychiatric:         Mood and Affect: Mood normal.         Behavior: Behavior normal.         Thought Content: Thought content normal.         Judgment: Judgment normal.       /79   Pulse 85   Temp 97.3 °F (36.3 °C) (Temporal)   Ht 5' 9\" (1.753 m)   Wt 201 lb (91.2 kg)   SpO2 97%   BMI 29.68 kg/m²     Assessment/Plan:   1. Palpitations  -     Holter Monitor 48 Hour; Future  2. Prediabetes  3. Tobacco abuse  -     nicotine (NICODERM CQ) 14 MG/24HR; Place 1 patch onto the skin daily, Disp-30 patch, R-1Normal  4. Right inguinal hernia  -     [facility] Rosemary Paz DO, General Surgery, [place]    Palpitations-started after second Covid shot. Will get Holter monitor to rule out any arrhythmia. Prediabetes-last A1c 6.1, not on any medications currently. Discussed dietary modifications and increasing activity, will recheck A1c in 3 months        Return in about 3 months (around 3/28/2022) for Follow up prediabetes. Orders Placed This Encounter   Procedures   [facility] - Rosemary Paz DO, General Surgery, [place]     Referral Priority:   Routine     Referral Type:   Eval and Treat     Referral Reason:   Specialty Services Required     Referred to Provider:   Erica Love DO     Requested Specialty:   General Surgery     Number of Visits Requested:   1    Holter Monitor 48 Hour     Standing Status:   Future     Standing Expiration Date:   12/28/2022     Order Specific Question:   Reason for Exam?     Answer: Tachycardia     Orders Placed This Encounter   Medications    nicotine (NICODERM CQ) 14 MG/24HR     Sig: Place 1 patch onto the skin daily     Dispense:  30 patch     Refill:  1       Patient given educational materials - see patient instructions. Discussed use, benefit, and side effects of prescribed medications. All patientquestions answered. Pt voiced understanding. Reviewed health maintenance. Instructedto continue current medications, diet and exercise. Patient agreed with treatmentplan. Follow up as directed.      Electronically signed by Shivani Luz MD on 12/28/2021 at 3:07 PM

## 2021-12-29 ENCOUNTER — TELEPHONE (OUTPATIENT)
Dept: SURGERY | Age: 61
End: 2021-12-29

## 2022-01-03 ENCOUNTER — TELEPHONE (OUTPATIENT)
Dept: SURGERY | Age: 62
End: 2022-01-03

## 2022-01-06 ENCOUNTER — TELEPHONE (OUTPATIENT)
Dept: SURGERY | Age: 62
End: 2022-01-06

## 2022-02-17 DIAGNOSIS — J44.9 COPD, SEVERITY TO BE DETERMINED (HCC): ICD-10-CM

## 2022-02-17 NOTE — TELEPHONE ENCOUNTER
Dr Lulú Sandhu, patient is current but not scheduled for follow up at this time. I sent a note to pharmacy asking that patient call for an appointment. Per last dictation patient to continue Dina Edmond. Please sign for refill if ok. Thank you.

## 2022-02-18 ENCOUNTER — TELEPHONE (OUTPATIENT)
Dept: PULMONOLOGY | Age: 62
End: 2022-02-18

## 2022-02-21 DIAGNOSIS — E55.9 VITAMIN D DEFICIENCY: ICD-10-CM

## 2022-02-21 RX ORDER — ERGOCALCIFEROL 1.25 MG/1
CAPSULE ORAL
Qty: 4 CAPSULE | Refills: 2 | Status: SHIPPED | OUTPATIENT
Start: 2022-02-21 | End: 2022-03-21

## 2022-02-21 NOTE — TELEPHONE ENCOUNTER
Len Thorne is calling to request a refill on the following medication(s):    Medication Request:  Requested Prescriptions     Pending Prescriptions Disp Refills    vitamin D (ERGOCALCIFEROL) 1.25 MG (19505 UT) CAPS capsule 4 capsule 2     Sig: TAKE ONE CAPSULE BY MOUTH ONCE WEEKLY       Last Visit Date (If Applicable):  3/29/6813    Next Visit Date:    Visit date not found

## 2022-03-16 ENCOUNTER — TELEPHONE (OUTPATIENT)
Dept: PULMONOLOGY | Age: 62
End: 2022-03-16

## 2022-03-16 NOTE — TELEPHONE ENCOUNTER
----- Message from Pipo Saini sent at 3/16/2022  8:32 AM EDT -----  Jeny Gaming, PET scan ordered by Dr Russel Parra on 9/29/21 fell into the overdue results folder. Please contact patient to see if this test was completed outside of a [facility] facility. If so please obtain results and if testing has not been completed please advise patient to complete. If testing needs to be rescheduled please do so.  Thank you.    ----- Message -----  From: SYSTEM  Sent: 3/16/2022   4:22 AM EDT  To: [facility] [place] Clinical Staff

## 2022-03-16 NOTE — TELEPHONE ENCOUNTER
A call was made to [facility] which went to voicemail. A message was left asking for a return call regarding scheduling his PET scan. My name and number were provided.

## 2022-03-19 DIAGNOSIS — E55.9 VITAMIN D DEFICIENCY: ICD-10-CM

## 2022-03-21 RX ORDER — ERGOCALCIFEROL 1.25 MG/1
CAPSULE ORAL
Qty: 4 CAPSULE | Refills: 2 | Status: SHIPPED | OUTPATIENT
Start: 2022-03-21 | End: 2022-04-18

## 2022-03-21 NOTE — TELEPHONE ENCOUNTER
Saumya Barnett is calling to request a refill on the following medication(s):    Medication Request:  Requested Prescriptions     Pending Prescriptions Disp Refills    vitamin D (ERGOCALCIFEROL) 1.25 MG (24759 UT) CAPS capsule [Pharmacy Med Name: VIT D2 (ERGOCAL) 1.25MG(50,000U) CP] 4 capsule 2     Sig: TAKE ONE CAPSULE BY MOUTH ONCE WEEKLY       Last Visit Date (If Applicable):  49/67/3070    Next Visit Date:    3/29/2022

## 2022-03-22 NOTE — TELEPHONE ENCOUNTER
Shannan Butts returned dropped call and is scheduled for PET on 4/18/22 and follow up appt. with Dr. Dania Mcghee is also rescheduled for 5/16/22.

## 2022-03-22 NOTE — TELEPHONE ENCOUNTER
Call was made to Courtney Vaughan to reschedule an appointment with Dr. Vlad Byrd which was cancelled on 3/21/22. When asked if he would also like to reschedule the PET scan , the connection was lost. A return call to him went to [employer] and a message was left providing Central Scheduling phone number and also my name and number were provided.

## 2022-03-29 ENCOUNTER — OFFICE VISIT (OUTPATIENT)
Dept: FAMILY MEDICINE CLINIC | Age: 62
End: 2022-03-29
Payer: MEDICARE

## 2022-03-29 VITALS
OXYGEN SATURATION: 98 % | HEIGHT: 70 IN | WEIGHT: 193.4 LBS | HEART RATE: 87 BPM | SYSTOLIC BLOOD PRESSURE: 126 MMHG | DIASTOLIC BLOOD PRESSURE: 82 MMHG | TEMPERATURE: 97 F | BODY MASS INDEX: 27.69 KG/M2

## 2022-03-29 DIAGNOSIS — E55.9 VITAMIN D DEFICIENCY: ICD-10-CM

## 2022-03-29 DIAGNOSIS — E78.00 PURE HYPERCHOLESTEROLEMIA: Primary | ICD-10-CM

## 2022-03-29 DIAGNOSIS — K40.90 RIGHT INGUINAL HERNIA: ICD-10-CM

## 2022-03-29 DIAGNOSIS — R73.03 PREDIABETES: ICD-10-CM

## 2022-03-29 DIAGNOSIS — Z72.0 TOBACCO ABUSE: ICD-10-CM

## 2022-03-29 DIAGNOSIS — Z23 NEED FOR PROPHYLACTIC VACCINATION AND INOCULATION AGAINST VARICELLA: ICD-10-CM

## 2022-03-29 DIAGNOSIS — Z00.00 ROUTINE HEALTH MAINTENANCE: ICD-10-CM

## 2022-03-29 PROCEDURE — 99214 OFFICE O/P EST MOD 30 MIN: CPT | Performed by: STUDENT IN AN ORGANIZED HEALTH CARE EDUCATION/TRAINING PROGRAM

## 2022-03-29 PROCEDURE — G8417 CALC BMI ABV UP PARAM F/U: HCPCS | Performed by: STUDENT IN AN ORGANIZED HEALTH CARE EDUCATION/TRAINING PROGRAM

## 2022-03-29 PROCEDURE — 4004F PT TOBACCO SCREEN RCVD TLK: CPT | Performed by: STUDENT IN AN ORGANIZED HEALTH CARE EDUCATION/TRAINING PROGRAM

## 2022-03-29 PROCEDURE — G8482 FLU IMMUNIZE ORDER/ADMIN: HCPCS | Performed by: STUDENT IN AN ORGANIZED HEALTH CARE EDUCATION/TRAINING PROGRAM

## 2022-03-29 PROCEDURE — 3017F COLORECTAL CA SCREEN DOC REV: CPT | Performed by: STUDENT IN AN ORGANIZED HEALTH CARE EDUCATION/TRAINING PROGRAM

## 2022-03-29 PROCEDURE — G8427 DOCREV CUR MEDS BY ELIG CLIN: HCPCS | Performed by: STUDENT IN AN ORGANIZED HEALTH CARE EDUCATION/TRAINING PROGRAM

## 2022-03-29 ASSESSMENT — ENCOUNTER SYMPTOMS
VOMITING: 0
DIARRHEA: 0
NAUSEA: 0
ABDOMINAL PAIN: 0
EYE DISCHARGE: 0
SHORTNESS OF BREATH: 0
WHEEZING: 0
COUGH: 0
CONSTIPATION: 0
SORE THROAT: 0
CHEST TIGHTNESS: 0

## 2022-03-29 NOTE — PROGRESS NOTES
[address] PRIMARY CARE  [address] [place] [address] [place] [address]  Dept: [phone]  Dept Fax: [phone]    Vladislav Romero is a 63 y.o. male who is a Established patient, who presents today for his medical conditions/complaints as noted below:  Chief Complaint   Patient presents with    3 Month Follow-Up    Palpitations     come and go     Blood Sugar Problem     pre diabetes    Other     request labs         HPI:     He is here today to follow-up on hyperlipidemia and prediabetes. He has been taking atorvastatin daily but has not been on any medications for his prediabetes. He says that he is nervous about taking too many medications and likes to avoid them. His mother passed away after routine surgery and he feels that it was because she was on too many medications. He also lost his sister at age 13 and his brother after he had blockage of his stent. He says that he had already lost his father at a very young age. He says that he occasionally gets depressed with having no living family member. He does not want to do counseling, says that he is managing well on his own. He is due for repeat colonoscopy but would like to wait for some time. He also has a right inguinal hernia which has been bothering him, he had a referral placed to general surgery last visit but he missed their calls for scheduling. He is also a current everyday smoker, says that he has been trying to cut down. He was put on Wellbutrin but that gave him side effects.            Hemoglobin A1C (%)   Date Value   05/26/2021 6.3 (H)             ( goal A1Cis < 7)   No results found for: LABMICR  LDL Cholesterol (mg/dL)   Date Value   06/18/2021 90   05/21/2021 148 (H)       (goal LDL is <100)   AST (U/L)   Date Value   09/01/2021 20     ALT (U/L)   Date Value   09/01/2021 25     BUN (mg/dL)   Date Value   06/18/2021 13     BP Readings from Last 3 Encounters:   03/29/22 126/82   12/28/21 137/79 09/29/21 139/89          (goal 120/80)    Past Medical History:   Diagnosis Date    Primary osteoarthritis of both hips     Primary osteoarthritis of left hip 6/22/2017    Seasonal allergies       History reviewed. No pertinent surgical history. Family History   Problem Relation Age of Onset    Coronary Art Dis Mother     Other Mother         ulcers    Dementia Mother     Heart Attack Father     Diabetes Brother     Colon Cancer Neg Hx     Breast Cancer Neg Hx     Prostate Cancer Neg Hx        Social History     Tobacco Use    Smoking status: Current Every Day Smoker     Packs/day: 1.00     Years: 44.00     Pack years: 44.00     Types: Cigarettes     Start date: 1/1/1976    Smokeless tobacco: Never Used   Substance Use Topics    Alcohol use: Yes     Comment: occassionly       Current Outpatient Medications   Medication Sig Dispense Refill    zoster recombinant adjuvanted vaccine (SHINGRIX) 50 MCG/0.5ML SUSR injection Inject 0.5 mLs into the muscle once for 1 dose 50 MCG IM then repeat 2-6 months. 1 each 1    vitamin D (ERGOCALCIFEROL) 1.25 MG (35158 UT) CAPS capsule TAKE ONE CAPSULE BY MOUTH ONCE WEEKLY 4 capsule 2    fluticasone-salmeterol (ADVAIR) 250-50 MCG/DOSE AEPB INHALE ONE PUFF BY MOUTH EVERY 12 HOURS 1 each 7    nicotine (NICODERM CQ) 14 MG/24HR Place 1 patch onto the skin daily 30 patch 1    albuterol sulfate  (90 Base) MCG/ACT inhaler INHALE TWO PUFFS BY MOUTH FOUR TIMES A DAY AS NEEDED FOR WHEEZING 8.5 g 1    atorvastatin (LIPITOR) 20 MG tablet TAKE ONE TABLET BY MOUTH DAILY 30 tablet 5    aspirin 81 MG EC tablet Take 81 mg by mouth daily       No current facility-administered medications for this visit.      No Known Allergies    Health Maintenance   Topic Date Due    Colorectal Cancer Screen  Never done    Shingles Vaccine (1 of 2) Never done    COVID-19 Vaccine (3 - Booster for Pfizer series) 01/29/2022    Hepatitis A vaccine (1 of 2 - Risk 2-dose series) 03/29/2023 (Originally 6/9/1961)    Hepatitis B vaccine (1 of 3 - Risk 3-dose series) 03/29/2023 (Originally 6/9/1979)    Depression Screen  05/20/2022    A1C test (Diabetic or Prediabetic)  05/26/2022    Lipid screen  06/18/2022    Low dose CT lung screening  09/01/2022    DTaP/Tdap/Td vaccine (2 - Td or Tdap) 01/01/2026    Pneumococcal 0-64 years Vaccine (2 of 2 - PPSV23) 06/09/2027    Flu vaccine  Completed    HIV screen  Completed    Hib vaccine  Aged Out    Meningococcal (ACWY) vaccine  Aged Out       Subjective:     Review of Systems   Constitutional: Negative for appetite change, fatigue and fever. HENT: Negative for congestion, ear pain, hearing loss and sore throat. Eyes: Negative for discharge and visual disturbance. Respiratory: Negative for cough, chest tightness, shortness of breath and wheezing. Cardiovascular: Negative for chest pain, palpitations and leg swelling. Gastrointestinal: Negative for abdominal pain, constipation, diarrhea, nausea and vomiting. Genitourinary: Negative for flank pain, frequency, hematuria and urgency. Musculoskeletal: Negative for arthralgias, gait problem, joint swelling and myalgias. Skin: Negative. Neurological: Negative for dizziness, weakness, numbness and headaches. Psychiatric/Behavioral: Negative. Negative for dysphoric mood. The patient is not nervous/anxious. Objective:     Physical Exam  Vitals reviewed. Constitutional:       Appearance: Normal appearance. He is normal weight. HENT:      Head: Normocephalic and atraumatic. Nose: Nose normal.      Mouth/Throat:      Mouth: Mucous membranes are moist.      Pharynx: Oropharynx is clear. Eyes:      Extraocular Movements: Extraocular movements intact. Conjunctiva/sclera: Conjunctivae normal.      Pupils: Pupils are equal, round, and reactive to light. Cardiovascular:      Rate and Rhythm: Normal rate and regular rhythm. Heart sounds: Normal heart sounds.  No murmur heard.  No gallop. Pulmonary:      Effort: Pulmonary effort is normal. No respiratory distress. Breath sounds: Normal breath sounds. No stridor. No wheezing. Abdominal:      General: Bowel sounds are normal. There is no distension. Palpations: Abdomen is soft. Tenderness: There is no abdominal tenderness. There is no guarding or rebound. Musculoskeletal:         General: No swelling or tenderness. Normal range of motion. Cervical back: Normal range of motion and neck supple. Skin:     General: Skin is warm and dry. Coloration: Skin is not jaundiced. Findings: No rash. Neurological:      General: No focal deficit present. Mental Status: He is alert and oriented to person, place, and time. Psychiatric:         Mood and Affect: Mood normal.         Behavior: Behavior normal.         Thought Content: Thought content normal.         Judgment: Judgment normal.       /82   Pulse 87   Temp 97 °F (36.1 °C)   Ht 5' 10\" (1.778 m)   Wt 193 lb 6.4 oz (87.7 kg)   SpO2 98%   BMI 27.75 kg/m²     Assessment/Plan:   1. Pure hypercholesterolemia  -     Lipid, Fasting; Future  2. Prediabetes  -     Hemoglobin A1C; Future  3. Right inguinal hernia  -     Kimmy Awad DO, General Surgery, [place]  4. Vitamin D deficiency  -     Vitamin D 25 Hydroxy; Future  5. Tobacco abuse  6. Routine health maintenance  -     CBC with Auto Differential; Future  -     Comprehensive Metabolic Panel, Fasting; Future  -     TSH with Reflex; Future  7. Need for prophylactic vaccination and inoculation against varicella  -     zoster recombinant adjuvanted vaccine [facility]) 50 MCG/0.5ML SUSR injection; Inject 0.5 mLs into the muscle once for 1 dose 50 MCG IM then repeat 2-6 months., Disp-1 each, R-1Print    Hyperlipidemiaon atorvastatin 20 mg daily, repeat lipid panel ordered    Prediabeteslast A1c 6.3, not on any medications currently.     Right inguinal hernia-advised to call surgery office to schedule appointment    Dorian Lu been trying to cut down, could not tolerate Wellbutrin    Return in about 6 months (around 9/29/2022) for Follow up labs. Orders Placed This Encounter   Procedures    CBC with Auto Differential     Standing Status:   Future     Standing Expiration Date:   9/29/2022    Comprehensive Metabolic Panel, Fasting     Standing Status:   Future     Standing Expiration Date:   9/29/2022    Hemoglobin A1C     Standing Status:   Future     Standing Expiration Date:   9/29/2022    Lipid, Fasting     Standing Status:   Future     Standing Expiration Date:   9/29/2022    TSH with Reflex     Standing Status:   Future     Standing Expiration Date:   9/29/2022    Vitamin D 25 Hydroxy     Standing Status:   Future     Standing Expiration Date:   9/29/2022   [facility] - Abigail Burgos DO, General Surgery, [place]     Referral Priority:   Routine     Referral Type:   Eval and Treat     Referral Reason:   Specialty Services Required     Referred to Provider:   Elisabeth Irwin DO     Requested Specialty:   General Surgery     Number of Visits Requested:   1     Orders Placed This Encounter   Medications    zoster recombinant adjuvanted vaccine [facility]) 50 MCG/0.5ML SUSR injection     Sig: Inject 0.5 mLs into the muscle once for 1 dose 50 MCG IM then repeat 2-6 months. Dispense:  1 each     Refill:  1       Patient given educational materials - see patient instructions. Discussed use, benefit, and side effects of prescribed medications. All patientquestions answered. Pt voiced understanding. Reviewed health maintenance. Instructedto continue current medications, diet and exercise. Patient agreed with treatmentplan. Follow up as directed.      Electronically signed by Kash Romero MD on 3/29/2022 at 7:59 PM

## 2022-04-05 ENCOUNTER — OFFICE VISIT (OUTPATIENT)
Dept: SURGERY | Age: 62
End: 2022-04-05
Payer: MEDICARE

## 2022-04-05 VITALS
WEIGHT: 193 LBS | BODY MASS INDEX: 27.63 KG/M2 | RESPIRATION RATE: 12 BRPM | OXYGEN SATURATION: 100 % | HEART RATE: 70 BPM | HEIGHT: 70 IN

## 2022-04-05 DIAGNOSIS — R19.09 INGUINAL BULGE: Primary | ICD-10-CM

## 2022-04-05 PROCEDURE — G8417 CALC BMI ABV UP PARAM F/U: HCPCS | Performed by: SURGERY

## 2022-04-05 PROCEDURE — 99203 OFFICE O/P NEW LOW 30 MIN: CPT | Performed by: SURGERY

## 2022-04-05 PROCEDURE — 3017F COLORECTAL CA SCREEN DOC REV: CPT | Performed by: SURGERY

## 2022-04-05 PROCEDURE — 4004F PT TOBACCO SCREEN RCVD TLK: CPT | Performed by: SURGERY

## 2022-04-05 PROCEDURE — G8427 DOCREV CUR MEDS BY ELIG CLIN: HCPCS | Performed by: SURGERY

## 2022-04-05 NOTE — LETTER
[facility]  Surgical Specialties - General Surgery  [address]  [place], [facility] [address]  Phone: [phone]  Fax: [phone]      22    Patient: Christine Levine  MRN: [id number]  : 1960  Date of visit: 2022    Dear Steff Castano MD:      Thank you for requesting consultation for Christine Levine in regards to evaluation for right inguinal bulge, ordered soft tissue ultrasound to better characterize this area, further recommendations to follow those imaging results. Below are the relevant portions of my assessment and plan of care. If you have questions, please do not hesitate to call me. I look forward to following Christine Levine along with you.     Sincerely,        Meeta Query, DO

## 2022-04-05 NOTE — PROGRESS NOTES
[id number] [address] [facility]   History & Physical  Lani Parry DO  Pt Name: Tre Redding  MRN: [id number]  YOB: 1960  Date of evaluation: 4/5/2022  Primary Care Physician: Kavitha Javier MD    Chief Complaint: right inguinal bulge      SUBJECTIVE:    History of Present Illness: This is a 63 y.o.  male who presents for right inguinal bulge, persistently reproducible for the past month but the patient feels this is probably been present for at least 15 years. Patient denies any incidence of this bulge getting worse and unable to be reduced, denies any testicle pain, no prior history of repair. Current smoker at least 1 pack/day    Chart review performed to add information to the HPI: Yes    Past Medical History   has a past medical history of Primary osteoarthritis of both hips, Primary osteoarthritis of left hip, and Seasonal allergies. Past Surgical History   has no past surgical history on file. Family History  family history includes Coronary Art Dis in his mother; Dementia in his mother; Diabetes in his brother; Heart Attack in his father; Other in his mother. Social History  Tobacco use:  reports that he has been smoking cigarettes. He started smoking about 46 years ago. He has a 44.00 pack-year smoking history. He has never used smokeless tobacco.  Alcohol use:  reports current alcohol use. Drug use:  reports previous drug use.       Medications  Current Medications:   Current Outpatient Medications   Medication Sig Dispense Refill    vitamin D (ERGOCALCIFEROL) 1.25 MG (67479 UT) CAPS capsule TAKE ONE CAPSULE BY MOUTH ONCE WEEKLY 4 capsule 2    fluticasone-salmeterol (ADVAIR) 250-50 MCG/DOSE AEPB INHALE ONE PUFF BY MOUTH EVERY 12 HOURS 1 each 7    nicotine (NICODERM CQ) 14 MG/24HR Place 1 patch onto the skin daily 30 patch 1    albuterol sulfate  (90 Base) MCG/ACT inhaler INHALE TWO PUFFS BY MOUTH FOUR TIMES A DAY AS NEEDED FOR WHEEZING 8.5 g 1    atorvastatin (LIPITOR) 20 MG tablet TAKE ONE TABLET BY MOUTH DAILY 30 tablet 5    aspirin 81 MG EC tablet Take 81 mg by mouth daily       No current facility-administered medications for this visit. Home Medications:   Prior to Admission medications    Medication Sig Start Date End Date Taking? Authorizing Provider   vitamin D (ERGOCALCIFEROL) 1.25 MG (59928 UT) CAPS capsule TAKE ONE CAPSULE BY MOUTH ONCE WEEKLY 3/21/22   SARAH Godfrey - LISA   fluticasone-salmeterol (ADVAIR) 250-50 MCG/DOSE AEPB INHALE ONE PUFF BY MOUTH EVERY 12 HOURS 2/17/22   Lidia Holt MD   nicotine (NICODERM CQ) 14 MG/24HR Place 1 patch onto the skin daily 12/28/21 3/29/22  Orquidea Escalera MD   albuterol sulfate  (90 Base) MCG/ACT inhaler INHALE TWO PUFFS BY MOUTH FOUR TIMES A DAY AS NEEDED FOR WHEEZING 12/20/21   Griffin Chery MD   atorvastatin (LIPITOR) 20 MG tablet TAKE ONE TABLET BY MOUTH DAILY 11/17/21   Griffin Chery MD   aspirin 81 MG EC tablet Take 81 mg by mouth daily    Historical Provider, MD       Allergies  Patient has no known allergies. Review of Systems:  General: Denies any fever, chills. Eyes: Denies any changes in vision, diplopia or eye pain  Ears, Nose, Mouth: Denies changes in hearing/tinnitus or drainage from ears, no rhinorrhea or bloody nose, no difficulty chewing  Throat: no difficulty swallowing, no throat pain  Respiratory: Denies any shortness of breath or cough. Cardiac: Denies any chest pain, palpitations, claudication or edema. Gastrointestinal: Denies any melena, hematochezia, hematemesis or pyrosis. Genitourinary: Denies any frequency, urgency, hesitancy or incontinence. Musculoskeletal: Denies worsening muscle weakness or recent trauma  Skin: Denies rashes or lesions  Psychiatric: Denies any recent changes in mood or affect  Hematologic: Denies bruising or bleeding easily.     PHYSICAL EXAMINATION  Vitals:   Vitals:    04/05/22 1524   Pulse: 70   Resp: 12   SpO2: 100%       General Appearance: awake, alert, no acute distress, well developed, well nourished   Skin:  Skin color, texture, turgor normal. No rashes or lesions. Head/face:  NCAT, face symmetrical  Eyes:  PERRL, no evidence of conjunctivitis or ptosis bilaterally  Ears:  External ears and canals grossly normal, no evidence of otorrhea. Nose/Sinuses:  Nares normal. Septum midline. Mucosa normal. No external drainage noted. Mouth/Neck:  Mucosa moist.  No external oral lesions. Trachea midline. No visible masses. Lungs:  Normal chest expansion, unlabored breathing without accessory muscle use. No audible rales, rhonchi, or wheezing. Cardiovascular: S1S2. No evidence of JVD. No evidence of pulsatile masses in abdomen  Abdomen:  Soft, non-tender, no organomegaly, no masses. Right inguinal bulge on Valsalva with normal overlying skin, nontender  Musculoskeletal: No evidence of bony/muscular deformities, trauma, atrophy of either left/right upper/lower extremity. No evidence of digital clubbing or cyanosis. Neurologic:  CN 2-12 grossly intact without obvious deficits. Grossly normal sensation in all extremities. Psychiatric: appropriate judgement and insight, appropriate recall of recent and remote memory, no evidence of depression/anxiety/agitation      DIAGNOSES:   Diagnosis Orders   1. Inguinal bulge  US PELVIS LIMITED       PLAN:  · We will obtain soft tissue ultrasound to better characterize this area  · As the patient is relatively symptom-free, I would recommend nonoperative management if ultrasound does show evidence of inguinal hernia, patient is a current cigarette smoker and therefore carries increased risk of postoperative complications  · We did discuss signs symptoms of incarceration or strangulation of the right inguinal hernia in which case patient should proceed directly to the nearest ER for evaluation and likely emergency surgery  · All questions were answered, pt is agreeable to this plan.   ·       Medical Decision Making: low complexity    Electronically signed by Carrillo Bolden DO on 4/5/2022 at 5:14 PM

## 2022-04-14 ENCOUNTER — HOSPITAL ENCOUNTER (OUTPATIENT)
Dept: ULTRASOUND IMAGING | Age: 62
Discharge: HOME | End: 2022-04-16
Payer: MEDICARE

## 2022-04-14 DIAGNOSIS — R19.09 INGUINAL BULGE: ICD-10-CM

## 2022-04-14 PROCEDURE — 76857 US EXAM PELVIC LIMITED: CPT

## 2022-04-15 DIAGNOSIS — E55.9 VITAMIN D DEFICIENCY: ICD-10-CM

## 2022-04-18 RX ORDER — ERGOCALCIFEROL 1.25 MG/1
CAPSULE ORAL
Qty: 4 CAPSULE | Refills: 2 | Status: SHIPPED | OUTPATIENT
Start: 2022-04-18 | End: 2022-05-20

## 2022-04-18 NOTE — TELEPHONE ENCOUNTER
Requested Prescriptions     Pending Prescriptions Disp Refills    vitamin D (ERGOCALCIFEROL) 1.25 MG (08768 UT) CAPS capsule [Pharmacy Med Name: VIT D2 (ERGOCAL) 1.25MG(50,000U) CP] 4 capsule 2     Sig: TAKE ONE CAPSULE BY MOUTH ONCE WEEKLY

## 2022-04-25 DIAGNOSIS — F17.200 CURRENT SMOKER: ICD-10-CM

## 2022-04-25 DIAGNOSIS — J43.8 OTHER EMPHYSEMA (HCC): Chronic | ICD-10-CM

## 2022-04-25 RX ORDER — ALBUTEROL SULFATE 90 UG/1
2 AEROSOL, METERED RESPIRATORY (INHALATION) EVERY 4 HOURS PRN
Qty: 8.5 G | Refills: 1 | Status: SHIPPED | OUTPATIENT
Start: 2022-04-25 | End: 2022-08-10 | Stop reason: SDUPTHER

## 2022-04-25 NOTE — TELEPHONE ENCOUNTER
Requested Prescriptions     Pending Prescriptions Disp Refills    albuterol sulfate  (90 Base) MCG/ACT inhaler 8.5 g 1     Sig: Inhale 2 puffs into the lungs every 4 hours as needed for Wheezing

## 2022-05-19 ENCOUNTER — HOSPITAL ENCOUNTER (OUTPATIENT)
Age: 62
Setting detail: SPECIMEN
Discharge: HOME | End: 2022-05-19

## 2022-05-19 DIAGNOSIS — Z00.00 ROUTINE HEALTH MAINTENANCE: ICD-10-CM

## 2022-05-19 DIAGNOSIS — E78.00 PURE HYPERCHOLESTEROLEMIA: ICD-10-CM

## 2022-05-19 DIAGNOSIS — E55.9 VITAMIN D DEFICIENCY: ICD-10-CM

## 2022-05-19 DIAGNOSIS — R73.03 PREDIABETES: ICD-10-CM

## 2022-05-19 LAB
ABSOLUTE EOS #: 0.21 K/UL (ref 0–0.44)
ABSOLUTE IMMATURE GRANULOCYTE: 0.03 K/UL (ref 0–0.3)
ABSOLUTE LYMPH #: 2.22 K/UL (ref 1.1–3.7)
ABSOLUTE MONO #: 0.82 K/UL (ref 0.1–1.2)
ALBUMIN SERPL-MCNC: 4.4 G/DL (ref 3.5–5.2)
ALBUMIN/GLOBULIN RATIO: 1.6 (ref 1–2.5)
ALP BLD-CCNC: 63 U/L (ref 40–129)
ALT SERPL-CCNC: 19 U/L (ref 5–41)
ANION GAP SERPL CALCULATED.3IONS-SCNC: 12 MMOL/L (ref 9–17)
AST SERPL-CCNC: 18 U/L
BASOPHILS # BLD: 1 % (ref 0–2)
BASOPHILS ABSOLUTE: 0.05 K/UL (ref 0–0.2)
BILIRUB SERPL-MCNC: 0.46 MG/DL (ref 0.3–1.2)
BUN BLDV-MCNC: 15 MG/DL (ref 6–20)
CALCIUM SERPL-MCNC: 9.4 MG/DL (ref 8.6–10.4)
CHLORIDE BLD-SCNC: 108 MMOL/L (ref 98–107)
CHOLESTEROL, FASTING: 156 MG/DL
CHOLESTEROL/HDL RATIO: 3.4
CO2: 22 MMOL/L (ref 20–31)
CREAT SERPL-MCNC: 0.93 MG/DL (ref 0.7–1.2)
EOSINOPHILS RELATIVE PERCENT: 2 % (ref 1–4)
ESTIMATED AVERAGE GLUCOSE: 131 MG/DL
GFR AFRICAN AMERICAN: >60 ML/MIN
GFR NON-AFRICAN AMERICAN: >60 ML/MIN
GFR SERPL CREATININE-BSD FRML MDRD: ABNORMAL ML/MIN/{1.73_M2}
GLUCOSE FASTING: 115 MG/DL (ref 70–99)
HBA1C MFR BLD: 6.2 % (ref 4–6)
HCT VFR BLD CALC: 50.5 % (ref 40.7–50.3)
HDLC SERPL-MCNC: 46 MG/DL
HEMOGLOBIN: 15.9 G/DL (ref 13–17)
IMMATURE GRANULOCYTES: 0 %
LDL CHOLESTEROL: 81 MG/DL (ref 0–130)
LYMPHOCYTES # BLD: 23 % (ref 24–43)
MCH RBC QN AUTO: 28.9 PG (ref 25.2–33.5)
MCHC RBC AUTO-ENTMCNC: 31.5 G/DL (ref 28.4–34.8)
MCV RBC AUTO: 91.8 FL (ref 82.6–102.9)
MONOCYTES # BLD: 9 % (ref 3–12)
NRBC AUTOMATED: 0 PER 100 WBC
PDW BLD-RTO: 14.6 % (ref 11.8–14.4)
PLATELET # BLD: 376 K/UL (ref 138–453)
PMV BLD AUTO: 11 FL (ref 8.1–13.5)
POTASSIUM SERPL-SCNC: 4 MMOL/L (ref 3.7–5.3)
RBC # BLD: 5.5 M/UL (ref 4.21–5.77)
RBC # BLD: ABNORMAL 10*6/UL
SEG NEUTROPHILS: 65 % (ref 36–65)
SEGMENTED NEUTROPHILS ABSOLUTE COUNT: 6.26 K/UL (ref 1.5–8.1)
SODIUM BLD-SCNC: 142 MMOL/L (ref 135–144)
TOTAL PROTEIN: 7.2 G/DL (ref 6.4–8.3)
TRIGLYCERIDE, FASTING: 146 MG/DL
TSH SERPL DL<=0.05 MIU/L-ACNC: 1.56 UIU/ML (ref 0.3–5)
VITAMIN D 25-HYDROXY: 43 NG/ML
WBC # BLD: 9.6 K/UL (ref 3.5–11.3)

## 2022-05-19 ASSESSMENT — PATIENT HEALTH QUESTIONNAIRE - PHQ9
SUM OF ALL RESPONSES TO PHQ QUESTIONS 1-9: 0
1. LITTLE INTEREST OR PLEASURE IN DOING THINGS: 0
SUM OF ALL RESPONSES TO PHQ QUESTIONS 1-9: 0
2. FEELING DOWN, DEPRESSED OR HOPELESS: 0
SUM OF ALL RESPONSES TO PHQ QUESTIONS 1-9: 0
SUM OF ALL RESPONSES TO PHQ9 QUESTIONS 1 & 2: 0
SUM OF ALL RESPONSES TO PHQ QUESTIONS 1-9: 0

## 2022-05-19 NOTE — TELEPHONE ENCOUNTER
Fracisco Jones is calling to request a refill on the following medication(s):    Medication Request:  Requested Prescriptions     Pending Prescriptions Disp Refills    vitamin D (ERGOCALCIFEROL) 1.25 MG (77077 UT) CAPS capsule [Pharmacy Med Name: VIT D2 (ERGOCAL) 1.25MG(50,000U) CP] 4 capsule 2     Sig: TAKE ONE CAPSULE BY MOUTH ONCE WEEKLY       Last Visit Date (If Applicable):  3/96/6102    Next Visit Date:    9/29/2022

## 2022-05-20 RX ORDER — ERGOCALCIFEROL 1.25 MG/1
CAPSULE ORAL
Qty: 4 CAPSULE | Refills: 2 | Status: SHIPPED | OUTPATIENT
Start: 2022-05-20 | End: 2022-09-29 | Stop reason: SDUPTHER

## 2022-05-23 DIAGNOSIS — E78.00 ELEVATED LDL CHOLESTEROL LEVEL: ICD-10-CM

## 2022-05-23 RX ORDER — ATORVASTATIN CALCIUM 20 MG/1
TABLET, FILM COATED ORAL
Qty: 30 TABLET | Refills: 5 | Status: SHIPPED | OUTPATIENT
Start: 2022-05-23 | End: 2022-08-10 | Stop reason: SDUPTHER

## 2022-05-23 NOTE — TELEPHONE ENCOUNTER
Christine Levine is calling to request a refill on the following medication(s):    Medication Request:  Requested Prescriptions     Pending Prescriptions Disp Refills    atorvastatin (LIPITOR) 20 MG tablet 30 tablet 5     Sig: TAKE ONE TABLET BY MOUTH DAILY       Last Visit Date (If Applicable):  8/89/1358    Next Visit Date:    9/29/2022

## 2022-07-29 ENCOUNTER — TELEPHONE (OUTPATIENT)
Dept: FAMILY MEDICINE CLINIC | Age: 62
End: 2022-07-29

## 2022-07-29 ENCOUNTER — HOSPITAL ENCOUNTER (EMERGENCY)
Age: 62
Discharge: HOME | End: 2022-07-29
Attending: EMERGENCY MEDICINE
Payer: MEDICARE

## 2022-07-29 VITALS
SYSTOLIC BLOOD PRESSURE: 145 MMHG | DIASTOLIC BLOOD PRESSURE: 80 MMHG | WEIGHT: 193 LBS | HEIGHT: 69 IN | OXYGEN SATURATION: 96 % | RESPIRATION RATE: 18 BRPM | BODY MASS INDEX: 28.58 KG/M2 | HEART RATE: 107 BPM | TEMPERATURE: 98.6 F

## 2022-07-29 DIAGNOSIS — K05.6 PERIODONTAL DISEASE: Primary | ICD-10-CM

## 2022-07-29 PROCEDURE — 99283 EMERGENCY DEPT VISIT LOW MDM: CPT

## 2022-07-29 RX ORDER — CHLORHEXIDINE GLUCONATE 0.12 MG/ML
15 RINSE ORAL 2 TIMES DAILY
Qty: 420 ML | Refills: 0 | Status: SHIPPED | OUTPATIENT
Start: 2022-07-29 | End: 2022-08-12

## 2022-07-29 RX ORDER — AMOXICILLIN AND CLAVULANATE POTASSIUM 875; 125 MG/1; MG/1
1 TABLET, FILM COATED ORAL 2 TIMES DAILY
Qty: 20 TABLET | Refills: 0 | Status: SHIPPED | OUTPATIENT
Start: 2022-07-29 | End: 2022-09-27 | Stop reason: ALTCHOICE

## 2022-07-29 ASSESSMENT — PAIN - FUNCTIONAL ASSESSMENT: PAIN_FUNCTIONAL_ASSESSMENT: 0-10

## 2022-07-29 ASSESSMENT — PAIN SCALES - GENERAL: PAINLEVEL_OUTOF10: 1

## 2022-07-29 ASSESSMENT — PAIN DESCRIPTION - PAIN TYPE: TYPE: ACUTE PAIN

## 2022-07-29 ASSESSMENT — PAIN DESCRIPTION - LOCATION: LOCATION: NECK

## 2022-07-29 ASSESSMENT — ENCOUNTER SYMPTOMS
WHEEZING: 1
SHORTNESS OF BREATH: 1

## 2022-07-29 NOTE — ED PROVIDER NOTES
[address] [facility] ED  EMERGENCY DEPARTMENT ENCOUNTER      Pt Name: Evens Renteria  MRN: [id number]  [place] 1960  Date of evaluation: 7/29/2022  Provider: Jocelyne Pantoja MD    CHIEF COMPLAINT     Chief Complaint   Patient presents with    Oral Swelling         HISTORY OF PRESENT ILLNESS   (Location/Symptom, Timing/Onset, Context/Setting,Quality, Duration, Modifying Factors, Severity)  Note limiting factors. Evens Renteria is a 63 y.o. male who presents to the emergency department with chief complaint of swelling in the right side of his neck that is not painful. He states that he does have periodontal disease and he thinks the infection may be causing the swollen lymph node that he can feel in his neck. He denies chills or fever or difficulty swallowing. Patient was found to have some mediastinal adenopathy last year in a CT scan of the chest and was recommended for PET scan by his pulmonologist which he has not had done yet. He wonders if he can get it done today. I have advised him to call up central scheduling and reschedule the study since it is performed by appointment only. The history is provided by the patient and medical records. Nursing Notes werereviewed. REVIEW OF SYSTEMS    (2-9 systems for level 4, 10 or more for level 5)     Review of Systems   Respiratory:  Positive for shortness of breath and wheezing. All other systems reviewed and are negative. Except as noted above the remainder of the review of systems was reviewed and negative. PAST MEDICAL HISTORY     Past Medical History:   Diagnosis Date    Primary osteoarthritis of both hips     Primary osteoarthritis of left hip 6/22/2017    Seasonal allergies          SURGICALHISTORY     History reviewed. No pertinent surgical history.       CURRENT MEDICATIONS       Discharge Medication List as of 7/29/2022  1:16 PM        CONTINUE these medications which have NOT CHANGED    Details   atorvastatin (LIPITOR) 20 MG tablet TAKE ONE TABLET BY MOUTH DAILY, Disp-30 tablet, R-5Normal      vitamin D (ERGOCALCIFEROL) 1.25 MG (01774 UT) CAPS capsule TAKE ONE CAPSULE BY MOUTH ONCE WEEKLY, Disp-4 capsule, R-2Normal      albuterol sulfate  (90 Base) MCG/ACT inhaler Inhale 2 puffs into the lungs every 4 hours as needed for Wheezing, Disp-8.5 g, R-1Normal      fluticasone-salmeterol (ADVAIR) 250-50 MCG/DOSE AEPB INHALE ONE PUFF BY MOUTH EVERY 12 HOURS, Disp-1 each, R-7PLEASE ASK PATIENT TO CALL OUR OFFICE FOR AN APPOINTMENT. Normal      nicotine (NICODERM CQ) 14 MG/24HR Place 1 patch onto the skin daily, Disp-30 patch, R-1Normal      aspirin 81 MG EC tablet Take 81 mg by mouth dailyHistorical Med             ALLERGIES     Patient has no known allergies.     FAMILY HISTORY       Family History   Problem Relation Age of Onset    Coronary Art Dis Mother     Other Mother         ulcers    Dementia Mother     Heart Attack Father     Diabetes Brother     Colon Cancer Neg Hx     Breast Cancer Neg Hx     Prostate Cancer Neg Hx           SOCIAL HISTORY       Social History     Socioeconomic History    Marital status: Single     Spouse name: None    Number of children: None    Years of education: None    Highest education level: None   Tobacco Use    Smoking status: Every Day     Packs/day: 1.00     Years: 44.00     Pack years: 44.00     Types: Cigarettes     Start date: 1/1/1976    Smokeless tobacco: Never   Vaping Use    Vaping Use: Never used   Substance and Sexual Activity    Alcohol use: Yes     Comment: occassionly     Drug use: Not Currently     Comment: used to smoke marijuana    Sexual activity: Not Currently     Partners: Female     Comment: had 80 female partners lifetime--has not been sexually active since 2017       [facility]    (up to 7 for level 4, 8 or more for level 5)     ED Triage Vitals [07/29/22 1245]   BP Temp Temp Source Heart Rate Resp SpO2 Height Weight   (!) 145/80 98.6 °F (37 °C) Oral (!) 107 18 96 % 5' 9\" (1.753 m) 193 lb (87.5 kg)       Physical Exam  Vitals reviewed. Constitutional:       General: He is not in acute distress. Appearance: He is not ill-appearing. HENT:      Head: Normocephalic. Right Ear: External ear normal.      Left Ear: External ear normal.      Nose: Nose normal.      Mouth/Throat:      Comments: Patient has evidence of some gum recession and periodontitis. He only has 3 of his incisors in the maxillary dentition. His periodontal disease appears to involve mainly the mandibular dentition. He does not have tenderness to percussion of his teeth. He has enlarged sublingual lymph nodes on both sides greater on the right side that are mobile and nontender. Cardiovascular:      Rate and Rhythm: Regular rhythm. Tachycardia present. Heart sounds: Normal heart sounds. Pulmonary:      Effort: Pulmonary effort is normal.      Breath sounds: Normal breath sounds. Abdominal:      Palpations: Abdomen is soft. Tenderness: There is no abdominal tenderness. Skin:     General: Skin is warm and dry. Coloration: Skin is not pale. Neurological:      General: No focal deficit present. Mental Status: He is alert and oriented to person, place, and time. DIAGNOSTIC RESULTS     EKG: All EKG's are interpreted by the Emergency Department Physician who either signs orCo-signs this chart in the absence of a cardiologist.    RADIOLOGY:     Interpretation per the Radiologist below, ifavailable at the time of this note:    No orders to display         ED BEDSIDE ULTRASOUND:   Performed by ED Physician - none    LABS:  Labs Reviewed - No data to display    All other labs were within normal range ornot returned as of this dictation.     EMERGENCY DEPARTMENT COURSE and DIFFERENTIAL DIAGNOSIS/MDM:   Vitals:    Vitals:    07/29/22 1245   BP: (!) 145/80   Pulse: (!) 107   Resp: 18   Temp: 98.6 °F (37 °C)   TempSrc: Oral   SpO2: 96%   Weight: 87.5 kg (193 lb) Height: 5' 9\" (1.753 m)            Patient's findings are consistent with periodontitis with regional adenopathy. He is placed on Augmentin and Peridex to swish and swallow and is referred to outpatient dental follow-up. MDM    CONSULTS:  None    PROCEDURES:  Unlessotherwise noted below, none     Procedures    FINAL IMPRESSION      1. Periodontal disease          DISPOSITION/PLAN   DISPOSITION Decision To Discharge 07/29/2022 01:14:30 PM      PATIENT REFERRED TO:  Antonio Parks MD  [address] [place] [address]  [phone]          Your dentist      AS SOON AS POSSIBLE      DISCHARGE MEDICATIONS:         Problem List:  Patient Active Problem List   Diagnosis Code    Primary osteoarthritis of left hip M16.12    Prediabetes R73.03    Tobacco abuse Z72.0    Chronic hepatitis C without hepatic coma ([facility] [address] B18.2    Pure hypercholesterolemia E78.00    Vitamin D deficiency E55.9           Summation      Patient Course: Discharged    ED Medicationsadministered this visit:  Medications - No data to display    New Prescriptions from this visit:    Discharge Medication List as of 7/29/2022  1:16 PM        START taking these medications    Details   amoxicillin-clavulanate (AUGMENTIN) 875-125 MG per tablet Take 1 tablet by mouth in the morning and 1 tablet before bedtime. Take with food. ., Disp-20 tablet, R-0Normal      chlorhexidine (PERIDEX) 0.12 % solution Take 15 mLs by mouth in the morning and 15 mLs before bedtime. Do all this for 14 days. Rinse and spit., Disp-420 mL, R-0Normal             Follow-up:  Antonio Parks MD  [address] [place] [address]          Your dentist      AS SOON AS POSSIBLE        Final Impression:   1.  Periodontal disease               (Please note that portions of this note were completed with a voice recognitionprogram.  Efforts were made to edit the dictations but occasionally words are mis-transcribed.)    Elgin Sultana MD (electronically signed)  Attending Emergency Physician            Clayton Abdullahi MD  07/29/22 8538

## 2022-07-29 NOTE — TELEPHONE ENCOUNTER
Patient has lymph node swelling due to periodontal disease and would like an antibiotic called in for this issue. He will be getting his teeth deep cleaned soon. Dentist did not have any earlier appointment available.

## 2022-08-01 ENCOUNTER — TELEPHONE (OUTPATIENT)
Dept: FAMILY MEDICINE CLINIC | Age: 62
End: 2022-08-01

## 2022-08-01 NOTE — TELEPHONE ENCOUNTER
[facility] ([facility]) ED Follow up Call    Reason for ED visit:  dental pain      8/1/2022     Hi Pamela Pickett , this is Sarah from Dr. Santiago Norwood's office, just calling to see how you are doing after your recent ED visit. Did you receive discharge instructions? yes  Do you understand the discharge instructions? Yes  Did the ED give you any new prescriptions? Yes  Were you able to fill your prescriptions? Yes      Do you have one of our red, yellow and green  Zone sheets that help you to determine when you should go to the ED? No      Do you need or want to make a follow up appt with your PCP? No, he has an appt with a dentist already     Do you have any further needs in the home, e.g. equipment?   Not Applicable        FU appts/Provider:    Future Appointments   Date Time Provider Modesto Sanchez   8/4/2022  3:30 PM [facility] INJECTION ROOM [facility]   8/4/2022  4:30 PM [facility] PET/CT ROOM [facility]   9/29/2022  1:00 PM MD Pool Vogel

## 2022-08-04 ENCOUNTER — HOSPITAL ENCOUNTER (OUTPATIENT)
Dept: NUCLEAR MEDICINE | Age: 62
Discharge: HOME | End: 2022-08-06
Payer: MEDICARE

## 2022-08-04 DIAGNOSIS — R59.0 MEDIASTINAL ADENOPATHY: ICD-10-CM

## 2022-08-04 LAB — GLUCOSE BLD-MCNC: 90 MG/DL (ref 75–110)

## 2022-08-04 PROCEDURE — A9552 F18 FDG: HCPCS | Performed by: INTERNAL MEDICINE

## 2022-08-04 PROCEDURE — 2580000003 HC RX 258: Performed by: INTERNAL MEDICINE

## 2022-08-04 PROCEDURE — 82947 ASSAY GLUCOSE BLOOD QUANT: CPT

## 2022-08-04 PROCEDURE — 78815 PET IMAGE W/CT SKULL-THIGH: CPT

## 2022-08-04 PROCEDURE — 3430000000 HC RX DIAGNOSTIC RADIOPHARMACEUTICAL: Performed by: INTERNAL MEDICINE

## 2022-08-04 RX ORDER — FLUDEOXYGLUCOSE F 18 200 MCI/ML
10 INJECTION, SOLUTION INTRAVENOUS
Status: COMPLETED | OUTPATIENT
Start: 2022-08-04 | End: 2022-08-04

## 2022-08-04 RX ORDER — SODIUM CHLORIDE 0.9 % (FLUSH) 0.9 %
10 SYRINGE (ML) INJECTION ONCE
Status: COMPLETED | OUTPATIENT
Start: 2022-08-04 | End: 2022-08-04

## 2022-08-04 RX ADMIN — SODIUM CHLORIDE, PRESERVATIVE FREE 10 ML: 5 INJECTION INTRAVENOUS at 13:38

## 2022-08-04 RX ADMIN — FLUDEOXYGLUCOSE F 18 11.7 MILLICURIE: 200 INJECTION, SOLUTION INTRAVENOUS at 13:39

## 2022-08-08 ENCOUNTER — TELEPHONE (OUTPATIENT)
Dept: ONCOLOGY | Age: 62
End: 2022-08-08

## 2022-08-08 DIAGNOSIS — Z87.891 PERSONAL HISTORY OF NICOTINE DEPENDENCE: Primary | ICD-10-CM

## 2022-08-08 NOTE — TELEPHONE ENCOUNTER
Our records indicate that your patient is coming due for their annual lung cancer screening follow up testing. For your convenience, we have pended the order for the scan for you. If you do not agree with the need for the test, please cancel the order and let us know. Sincerely,    [address] [place] [address] Screening Program    Auto printed reminder letter sent to patient.

## 2022-08-10 DIAGNOSIS — J43.8 OTHER EMPHYSEMA (HCC): Primary | ICD-10-CM

## 2022-08-10 DIAGNOSIS — F17.200 CURRENT SMOKER: ICD-10-CM

## 2022-08-10 DIAGNOSIS — J43.8 OTHER EMPHYSEMA (HCC): Chronic | ICD-10-CM

## 2022-08-10 DIAGNOSIS — E78.00 ELEVATED LDL CHOLESTEROL LEVEL: ICD-10-CM

## 2022-08-10 RX ORDER — ATORVASTATIN CALCIUM 20 MG/1
TABLET, FILM COATED ORAL
Qty: 30 TABLET | Refills: 5 | Status: SHIPPED | OUTPATIENT
Start: 2022-08-10 | End: 2022-09-29 | Stop reason: SDUPTHER

## 2022-08-10 RX ORDER — FLUTICASONE PROPIONATE AND SALMETEROL 250; 50 UG/1; UG/1
1 POWDER RESPIRATORY (INHALATION) EVERY 12 HOURS
Qty: 60 EACH | Refills: 3 | Status: SHIPPED | OUTPATIENT
Start: 2022-08-10

## 2022-08-10 RX ORDER — ALBUTEROL SULFATE 90 UG/1
2 AEROSOL, METERED RESPIRATORY (INHALATION) EVERY 4 HOURS PRN
Qty: 8.5 G | Refills: 1 | Status: SHIPPED | OUTPATIENT
Start: 2022-08-10

## 2022-08-10 NOTE — PROGRESS NOTES
Nam Marie is calling to request a refill on the following medication(s):    Medication Request:  Requested Prescriptions     Pending Prescriptions Disp Refills    fluticasone-salmeterol (ADVAIR) 250-50 MCG/ACT AEPB diskus inhaler 60 each 3     Sig: Inhale 1 puff into the lungs in the morning and 1 puff in the evening.        Last Visit Date (If Applicable):  Visit date not found    Next Visit Date:    Visit date not found

## 2022-08-10 NOTE — TELEPHONE ENCOUNTER
Fran Hampton is calling to request a refill on the following medication(s):    Medication Request:  Requested Prescriptions     Pending Prescriptions Disp Refills    albuterol sulfate HFA (PROVENTIL;VENTOLIN;PROAIR) 108 (90 Base) MCG/ACT inhaler 8.5 g 1     Sig: Inhale 2 puffs into the lungs every 4 hours as needed for Wheezing    atorvastatin (LIPITOR) 20 MG tablet 30 tablet 5     Sig: TAKE ONE TABLET BY MOUTH DAILY       Last Visit Date (If Applicable):  2/11/0792    Next Visit Date:    9/29/2022

## 2022-08-16 DIAGNOSIS — J43.8 OTHER EMPHYSEMA (HCC): Primary | ICD-10-CM

## 2022-08-16 DIAGNOSIS — Z72.0 TOBACCO ABUSE: ICD-10-CM

## 2022-08-16 RX ORDER — NICOTINE 21 MG/24HR
1 PATCH, TRANSDERMAL 24 HOURS TRANSDERMAL DAILY
Qty: 30 PATCH | Refills: 1 | Status: SHIPPED | OUTPATIENT
Start: 2022-08-16 | End: 2022-09-15

## 2022-08-16 NOTE — TELEPHONE ENCOUNTER
----- Message from Kristy Haque sent at 8/16/2022  9:46 AM EDT -----  Subject: Refill Request    QUESTIONS  Name of Medication? nicotine (NICODERM CQ) 14 MG/24HR  Patient-reported dosage and instructions? daily change every 24 hours  How many days do you have left? 0  Preferred Pharmacy? [facility] [id number]  Pharmacy phone number (if available)? [phone]  Additional Information for Provider? Pt is asking for a stronger dose.  ---------------------------------------------------------------------------  --------------  CALL BACK INFO  What is the best way for the office to contact you? OK to leave message on   voicemail  Preferred Call Back Phone Number? [phone]  ---------------------------------------------------------------------------  --------------  SCRIPT ANSWERS  Relationship to Patient?  Self

## 2022-08-16 NOTE — TELEPHONE ENCOUNTER
Tatiana Matthews is calling to request a refill on the following medication(s):    Medication Request:  Requested Prescriptions     Pending Prescriptions Disp Refills    nicotine (NICODERM CQ) 14 MG/24HR 30 patch 1     Sig: Place 1 patch onto the skin in the morning.        Last Visit Date (If Applicable):  5/63/3039    Next Visit Date:    9/29/2022

## 2022-08-18 ENCOUNTER — TELEPHONE (OUTPATIENT)
Dept: PULMONOLOGY | Age: 62
End: 2022-08-18

## 2022-08-18 ENCOUNTER — TELEPHONE (OUTPATIENT)
Dept: FAMILY MEDICINE CLINIC | Age: 62
End: 2022-08-18

## 2022-08-18 ENCOUNTER — HOSPITAL ENCOUNTER (OUTPATIENT)
Dept: CT IMAGING | Age: 62
Discharge: HOME | End: 2022-08-20
Payer: MEDICARE

## 2022-08-18 DIAGNOSIS — Z87.891 PERSONAL HISTORY OF NICOTINE DEPENDENCE: ICD-10-CM

## 2022-08-18 DIAGNOSIS — J43.8 OTHER EMPHYSEMA (HCC): Primary | ICD-10-CM

## 2022-08-18 PROCEDURE — 71271 CT THORAX LUNG CANCER SCR C-: CPT

## 2022-08-18 RX ORDER — ALBUTEROL SULFATE 2.5 MG/3ML
2.5 SOLUTION RESPIRATORY (INHALATION) EVERY 4 HOURS PRN
Qty: 30 EACH | Refills: 2 | Status: SHIPPED | OUTPATIENT
Start: 2022-08-18 | End: 2022-10-10

## 2022-08-18 NOTE — TELEPHONE ENCOUNTER
Patient states that the Albuterol solution is the wrong, that one you have to mix with saline.  He needs the one you don't have to mix

## 2022-08-19 NOTE — TELEPHONE ENCOUNTER
Called patient and updated him about the PET scan results. There is no FDG avidity in the mediastinal lymph nodes. The increased uptake seen in the neck is likely related to his recent tooth infection. Will defer its management to primary care. I also reviewed the findings of lung cancer screening CT done yesterday.

## 2022-09-13 ENCOUNTER — TELEPHONE (OUTPATIENT)
Dept: FAMILY MEDICINE CLINIC | Age: 62
End: 2022-09-13

## 2022-09-13 NOTE — TELEPHONE ENCOUNTER
Patient want to try to 30 mg for a month nicoderm patch please advise if your ok with this please send in

## 2022-09-15 ENCOUNTER — TELEPHONE (OUTPATIENT)
Dept: FAMILY MEDICINE CLINIC | Age: 62
End: 2022-09-15

## 2022-09-15 DIAGNOSIS — Z72.0 TOBACCO ABUSE: Primary | ICD-10-CM

## 2022-09-15 RX ORDER — NICOTINE 21 MG/24HR
1 PATCH, TRANSDERMAL 24 HOURS TRANSDERMAL DAILY
Qty: 42 PATCH | Refills: 0 | Status: SHIPPED | OUTPATIENT
Start: 2022-09-15 | End: 2022-10-31

## 2022-09-15 NOTE — TELEPHONE ENCOUNTER
Patient had a bump on his sternum near the breast area without any pain it has been in that area for a few years. He would like a referral for this issue.    He would like for you to call in the Nicoderm  21 mg patch

## 2022-09-20 ENCOUNTER — TELEPHONE (OUTPATIENT)
Dept: SURGERY | Age: 62
End: 2022-09-20

## 2022-09-20 NOTE — TELEPHONE ENCOUNTER
9/20/22- Spoke to patient, surgery scheduled at [facility]. Patient confirmed and information sent via [employer].     Surgery date/time: 9/30/22 at 1pm  Arrival time: 11am  PAT: 9/27/22 at [address] op: 10/10/22 at 2:40pm

## 2022-09-27 ENCOUNTER — HOSPITAL ENCOUNTER (OUTPATIENT)
Dept: PREADMISSION TESTING | Age: 62
Discharge: HOME | End: 2022-10-01
Payer: MEDICARE

## 2022-09-27 VITALS
BODY MASS INDEX: 25.48 KG/M2 | RESPIRATION RATE: 18 BRPM | SYSTOLIC BLOOD PRESSURE: 129 MMHG | HEART RATE: 92 BPM | OXYGEN SATURATION: 98 % | DIASTOLIC BLOOD PRESSURE: 83 MMHG | WEIGHT: 172 LBS | HEIGHT: 69 IN

## 2022-09-27 LAB
EKG ATRIAL RATE: 76 BPM
EKG P AXIS: 67 DEGREES
EKG P-R INTERVAL: 180 MS
EKG Q-T INTERVAL: 384 MS
EKG QRS DURATION: 88 MS
EKG QTC CALCULATION (BAZETT): 432 MS
EKG R AXIS: 71 DEGREES
EKG T AXIS: 48 DEGREES
EKG VENTRICULAR RATE: 76 BPM

## 2022-09-27 PROCEDURE — 93005 ELECTROCARDIOGRAM TRACING: CPT | Performed by: ANESTHESIOLOGY

## 2022-09-27 RX ORDER — IBUPROFEN 600 MG/1
TABLET ORAL
COMMUNITY
Start: 2022-08-16 | End: 2022-10-25 | Stop reason: SDUPTHER

## 2022-09-27 ASSESSMENT — PAIN DESCRIPTION - DESCRIPTORS: DESCRIPTORS: THROBBING

## 2022-09-27 ASSESSMENT — PAIN - FUNCTIONAL ASSESSMENT: PAIN_FUNCTIONAL_ASSESSMENT: PREVENTS OR INTERFERES WITH MANY ACTIVE NOT PASSIVE ACTIVITIES

## 2022-09-27 ASSESSMENT — PAIN SCALES - GENERAL: PAINLEVEL_OUTOF10: 1

## 2022-09-27 ASSESSMENT — PAIN DESCRIPTION - ONSET: ONSET: ON-GOING

## 2022-09-27 ASSESSMENT — PAIN DESCRIPTION - ORIENTATION: ORIENTATION: RIGHT

## 2022-09-27 ASSESSMENT — PAIN DESCRIPTION - LOCATION: LOCATION: GROIN

## 2022-09-27 ASSESSMENT — PAIN DESCRIPTION - FREQUENCY: FREQUENCY: INTERMITTENT

## 2022-09-27 NOTE — DISCHARGE INSTRUCTIONS
Preoperative Instructions:    Stop eating solid foods at midnight the night prior to your surgery. Stop drinking clear liquids at midnight the night prior to your surgery. Arrive at the surgery center (3rd entrance) on ___8-99-11____________ by __1100am_____________. Please stop any blood thinning medications as directed by your surgeon or prescribing physician. Failure to stop certain medications may interfere with your scheduled surgery. These may include: Aspirin, Coumadin, Plavix, NSAIDS (Motrin, Aleve, Advil, Mobic, Celebrex), Eliquis, Pradaxa, Xarelto, Fish oil, and herbal supplements. Stop Aspirin today. You may continue the rest of your medications through the night before surgery unless instructed otherwise. Day of surgery please take only the following medication(s) with a small sip of water: May take Lipitor. PLEASE DO NOT smoke the morning of surgery. Please use and bring inhalers the day of surgery. Pro Air- ALbuterol  Please shower with  provided CHG soap  and water the night before and the morning of surgery. Reminders:  -If you are going home the day of your procedure, you will need a family member or friend to stay during the procedure and drive you home after your procedure. Your  must be 25years of age or older and able to sign off on your discharge instructions.    -If you are going home the same day of your surgery, someone must remain with you for the first 24 hours after your surgery if you receive sedation or anesthesia.      -Please do not wear any jewelery , lotions, or body piercing the day of surgery

## 2022-09-28 ENCOUNTER — TELEPHONE (OUTPATIENT)
Dept: SURGERY | Age: 62
End: 2022-09-28

## 2022-09-28 ENCOUNTER — ANESTHESIA EVENT (OUTPATIENT)
Dept: OPERATING ROOM | Age: 62
End: 2022-09-28
Payer: MEDICARE

## 2022-09-28 NOTE — TELEPHONE ENCOUNTER
9/28/22- Spoke to patient, letting them know surgery start time moved to 12pm, arrival time is 10am. Patient confirmed.

## 2022-09-29 ENCOUNTER — OFFICE VISIT (OUTPATIENT)
Dept: FAMILY MEDICINE CLINIC | Age: 62
End: 2022-09-29
Payer: MEDICARE

## 2022-09-29 VITALS
BODY MASS INDEX: 25.33 KG/M2 | WEIGHT: 171 LBS | SYSTOLIC BLOOD PRESSURE: 136 MMHG | HEIGHT: 69 IN | TEMPERATURE: 97.2 F | DIASTOLIC BLOOD PRESSURE: 85 MMHG | OXYGEN SATURATION: 95 % | HEART RATE: 88 BPM

## 2022-09-29 DIAGNOSIS — N28.1 KIDNEY CYSTS: ICD-10-CM

## 2022-09-29 DIAGNOSIS — Z72.0 TOBACCO ABUSE: ICD-10-CM

## 2022-09-29 DIAGNOSIS — Z12.11 COLON CANCER SCREENING: ICD-10-CM

## 2022-09-29 DIAGNOSIS — E55.9 VITAMIN D DEFICIENCY: ICD-10-CM

## 2022-09-29 DIAGNOSIS — E78.00 PURE HYPERCHOLESTEROLEMIA: ICD-10-CM

## 2022-09-29 DIAGNOSIS — J43.9 PULMONARY EMPHYSEMA, UNSPECIFIED EMPHYSEMA TYPE (HCC): Primary | ICD-10-CM

## 2022-09-29 DIAGNOSIS — R73.03 PREDIABETES: ICD-10-CM

## 2022-09-29 PROCEDURE — 4004F PT TOBACCO SCREEN RCVD TLK: CPT | Performed by: STUDENT IN AN ORGANIZED HEALTH CARE EDUCATION/TRAINING PROGRAM

## 2022-09-29 PROCEDURE — G8417 CALC BMI ABV UP PARAM F/U: HCPCS | Performed by: STUDENT IN AN ORGANIZED HEALTH CARE EDUCATION/TRAINING PROGRAM

## 2022-09-29 PROCEDURE — 3017F COLORECTAL CA SCREEN DOC REV: CPT | Performed by: STUDENT IN AN ORGANIZED HEALTH CARE EDUCATION/TRAINING PROGRAM

## 2022-09-29 PROCEDURE — 3023F SPIROM DOC REV: CPT | Performed by: STUDENT IN AN ORGANIZED HEALTH CARE EDUCATION/TRAINING PROGRAM

## 2022-09-29 PROCEDURE — 99214 OFFICE O/P EST MOD 30 MIN: CPT | Performed by: STUDENT IN AN ORGANIZED HEALTH CARE EDUCATION/TRAINING PROGRAM

## 2022-09-29 PROCEDURE — G8427 DOCREV CUR MEDS BY ELIG CLIN: HCPCS | Performed by: STUDENT IN AN ORGANIZED HEALTH CARE EDUCATION/TRAINING PROGRAM

## 2022-09-29 RX ORDER — AMOXICILLIN 500 MG/1
CAPSULE ORAL
Status: CANCELLED | OUTPATIENT
Start: 2022-09-29

## 2022-09-29 RX ORDER — AMOXICILLIN 500 MG/1
CAPSULE ORAL
COMMUNITY
Start: 2022-08-16 | End: 2022-09-29

## 2022-09-29 RX ORDER — ERGOCALCIFEROL 1.25 MG/1
CAPSULE ORAL
Qty: 4 CAPSULE | Refills: 2 | Status: SHIPPED | OUTPATIENT
Start: 2022-09-29 | End: 2022-10-10

## 2022-09-29 RX ORDER — ATORVASTATIN CALCIUM 20 MG/1
TABLET, FILM COATED ORAL
Qty: 30 TABLET | Refills: 5 | Status: SHIPPED | OUTPATIENT
Start: 2022-09-29

## 2022-09-29 SDOH — ECONOMIC STABILITY: FOOD INSECURITY: WITHIN THE PAST 12 MONTHS, YOU WORRIED THAT YOUR FOOD WOULD RUN OUT BEFORE YOU GOT MONEY TO BUY MORE.: NEVER TRUE

## 2022-09-29 SDOH — ECONOMIC STABILITY: FOOD INSECURITY: WITHIN THE PAST 12 MONTHS, THE FOOD YOU BOUGHT JUST DIDN'T LAST AND YOU DIDN'T HAVE MONEY TO GET MORE.: NEVER TRUE

## 2022-09-29 ASSESSMENT — SOCIAL DETERMINANTS OF HEALTH (SDOH): HOW HARD IS IT FOR YOU TO PAY FOR THE VERY BASICS LIKE FOOD, HOUSING, MEDICAL CARE, AND HEATING?: NOT HARD AT ALL

## 2022-09-29 NOTE — PROGRESS NOTES
[address] PRIMARY CARE  [address] [place] [address] [address] [place] [address]  Dept: [phone]  Dept Fax: [phone]    Anahi Sarmiento is a 63 y.o. male who is a Established patient, who presents today for his medical conditions/complaints as noted below:  Chief Complaint   Patient presents with    6 Month [address] Jasbir 1    Other     Skin issues on his back          HPI:     He is here today to follow-up on his chronic conditions. He says that he has been doing well on current medications and has had no issues. He says that he feels much better overall in his mood since he has been helping take care of his ex-girlfriend from high school. He says that she has been dealing with postconcussion syndrome and he has been helping her out and that has put him in a better mental state. He says that he has some skin lesions on his back that he would like to be checked out for. Denies any recent change in lesions or any itching. He has also been cutting down on his smoking and is down to 3 cigarettes a day. He is due for his colonoscopy.           Hemoglobin A1C (%)   Date Value   05/19/2022 6.2 (H)   05/26/2021 6.3 (H)             ( goal A1Cis < 7)   No results found for: LABMICR  LDL Cholesterol (mg/dL)   Date Value   05/19/2022 81   06/18/2021 90   05/21/2021 148 (H)       (goal LDL is <100)   AST (U/L)   Date Value   05/19/2022 18     ALT (U/L)   Date Value   05/19/2022 19     BUN (mg/dL)   Date Value   05/19/2022 15     BP Readings from Last 3 Encounters:   09/30/22 (!) 145/84   09/29/22 136/85   09/27/22 129/83          (goal 120/80)    Past Medical History:   Diagnosis Date    COPD (chronic obstructive pulmonary disease) (HCC)     emphysema    Gingival and periodontal disease     chronic    Hyperlipidemia     Primary osteoarthritis of both hips     Primary osteoarthritis of left hip 06/22/2017    Seasonal allergies       Past Surgical History:   Procedure Laterality Date COLONOSCOPY      HERNIA REPAIR Right 9/30/2022    RIGHT INGUINAL  HERNIA REPAIR LAPAROSCOPIC ROBOTIC WITH MESH performed by Jacquelynn Scheuermann, DO at [facility] Right 09/30/2022    RIGHT INGUINAL  HERNIA REPAIR LAPAROSCOPIC ROBOTIC WITH MESH       Family History   Problem Relation Age of Onset    Coronary Art Dis Mother     Other Mother         ulcers    Dementia Mother     Heart Attack Father     Diabetes Brother     Colon Cancer Neg Hx     Breast Cancer Neg Hx     Prostate Cancer Neg Hx        Social History     Tobacco Use    Smoking status: Every Day     Packs/day: 0.25     Years: 44.00     Pack years: 11.00     Types: Cigarettes     Start date: 1/1/1976    Smokeless tobacco: Never   Substance Use Topics    Alcohol use: Yes     Comment: occassionly       Current Outpatient Medications   Medication Sig Dispense Refill    vitamin D (ERGOCALCIFEROL) 1.25 MG (44029 UT) CAPS capsule TAKE ONE CAPSULE BY MOUTH ONCE WEEKLY 4 capsule 2    atorvastatin (LIPITOR) 20 MG tablet TAKE ONE TABLET BY MOUTH DAILY 30 tablet 5    ibuprofen (ADVIL;MOTRIN) 600 MG tablet       nicotine (NICODERM CQ) 21 MG/24HR Place 1 patch onto the skin daily 42 patch 0    albuterol (PROVENTIL) (5 MG/ML) 0.5% nebulizer solution Take 0.5 mLs by nebulization 4 times daily as needed for Wheezing 120 each 3    albuterol sulfate HFA (PROVENTIL;VENTOLIN;PROAIR) 108 (90 Base) MCG/ACT inhaler Inhale 2 puffs into the lungs every 4 hours as needed for Wheezing 8.5 g 1    fluticasone-salmeterol (ADVAIR) 250-50 MCG/ACT AEPB diskus inhaler Inhale 1 puff into the lungs in the morning and 1 puff in the evening. 60 each 3    aspirin 81 MG EC tablet Take 81 mg by mouth daily      oxyCODONE-acetaminophen (PERCOCET) 5-325 MG per tablet Take 1 tablet by mouth every 6 hours as needed for Pain for up to 7 days. Intended supply: 7 days.  Take lowest dose possible to manage pain 15 tablet 0    docusate sodium (COLACE) 100 MG capsule Take 1 capsule by mouth 2 times daily 20 capsule 0    albuterol (PROVENTIL) (2.5 MG/3ML) 0.083% nebulizer solution Take 3 mLs by nebulization every 4 hours as needed for Wheezing or Shortness of Breath 30 each 2     No current facility-administered medications for this visit. No Known Allergies    Health Maintenance   Topic Date Due    Colorectal Cancer Screen  Never done    COVID-19 Vaccine (3 - Booster for Pfizer series) 01/29/2022    Hepatitis A vaccine (1 of 2 - Risk 2-dose series) 03/29/2023 (Originally 6/9/1961)    Hepatitis B vaccine (1 of 3 - Risk 3-dose series) 03/29/2023 (Originally 6/9/1979)    A1C test (Diabetic or Prediabetic)  05/19/2023    Lipids  05/19/2023    Depression Screen  05/19/2023    DTaP/Tdap/Td vaccine (2 - Td or Tdap) 01/01/2026    Flu vaccine  Completed    Shingles vaccine  Completed    Pneumococcal 0-64 years Vaccine  Completed    HIV screen  Completed    Hib vaccine  Aged Out    Meningococcal (ACWY) vaccine  Aged Out       Subjective:     Review of Systems   Constitutional:  Negative for appetite change, fatigue and fever. HENT:  Negative for congestion, ear pain, hearing loss and sore throat. Eyes:  Negative for discharge and visual disturbance. Respiratory:  Negative for cough, chest tightness, shortness of breath and wheezing. Cardiovascular:  Negative for chest pain, palpitations and leg swelling. Gastrointestinal:  Negative for abdominal pain, constipation, diarrhea, nausea and vomiting. Genitourinary:  Negative for flank pain, frequency, hematuria and urgency. Musculoskeletal:  Negative for arthralgias, gait problem, joint swelling and myalgias. Skin: Negative. Neurological:  Negative for dizziness, weakness, numbness and headaches. Psychiatric/Behavioral: Negative. Negative for dysphoric mood. The patient is not nervous/anxious. Objective:     Physical Exam  Vitals reviewed. Constitutional:       Appearance: Normal appearance. He is normal weight.    HENT: Head: Normocephalic and atraumatic. Right Ear: Tympanic membrane normal.      Left Ear: Tympanic membrane normal.      Nose: Nose normal.      Mouth/Throat:      Mouth: Mucous membranes are moist.      Pharynx: Oropharynx is clear. Eyes:      Extraocular Movements: Extraocular movements intact. Conjunctiva/sclera: Conjunctivae normal.      Pupils: Pupils are equal, round, and reactive to light. Cardiovascular:      Rate and Rhythm: Normal rate and regular rhythm. Heart sounds: Normal heart sounds. No murmur heard. No gallop. Pulmonary:      Effort: Pulmonary effort is normal. No respiratory distress. Breath sounds: Normal breath sounds. No stridor. No wheezing. Abdominal:      General: Bowel sounds are normal. There is no distension. Palpations: Abdomen is soft. Tenderness: There is no abdominal tenderness. There is no guarding or rebound. Musculoskeletal:         General: No swelling or tenderness. Normal range of motion. Cervical back: Normal range of motion and neck supple. Skin:     General: Skin is warm and dry. Coloration: Skin is not jaundiced. Findings: No rash. Comments: Multiple pigmented lesions all over back, a few larger in size, all lesions appear benign   Neurological:      General: No focal deficit present. Mental Status: He is alert and oriented to person, place, and time. Psychiatric:         Mood and Affect: Mood normal.         Behavior: Behavior normal.         Thought Content: Thought content normal.         Judgment: Judgment normal.     /85   Pulse 88   Temp 97.2 °F (36.2 °C)   Ht 5' 9\" (1.753 m)   Wt 171 lb (77.6 kg)   SpO2 95%   BMI 25.25 kg/m²     Assessment/Plan:   1. Pulmonary emphysema, unspecified emphysema type ([address]  2. Pure hypercholesterolemia  -     atorvastatin (LIPITOR) 20 MG tablet; TAKE ONE TABLET BY MOUTH DAILY, Disp-30 tablet, R-5Normal  3. Prediabetes  4.  Kidney cysts  -     US RENAL treatmentplan. Follow up as directed.      Electronically signed by Bryson Hamilton MD on 10/1/2022 at 12:48 PM

## 2022-09-30 ENCOUNTER — ANESTHESIA (OUTPATIENT)
Dept: OPERATING ROOM | Age: 62
End: 2022-09-30
Payer: MEDICARE

## 2022-09-30 ENCOUNTER — HOSPITAL ENCOUNTER (OUTPATIENT)
Age: 62
Setting detail: OUTPATIENT SURGERY
Discharge: HOME | End: 2022-09-30
Attending: SURGERY | Admitting: SURGERY
Payer: MEDICARE

## 2022-09-30 VITALS
BODY MASS INDEX: 25.03 KG/M2 | HEIGHT: 69 IN | RESPIRATION RATE: 11 BRPM | SYSTOLIC BLOOD PRESSURE: 145 MMHG | HEART RATE: 92 BPM | WEIGHT: 169 LBS | TEMPERATURE: 97.1 F | OXYGEN SATURATION: 95 % | DIASTOLIC BLOOD PRESSURE: 84 MMHG

## 2022-09-30 DIAGNOSIS — K40.90 NON-RECURRENT UNILATERAL INGUINAL HERNIA WITHOUT OBSTRUCTION OR GANGRENE: Primary | ICD-10-CM

## 2022-09-30 PROCEDURE — 6360000002 HC RX W HCPCS: Performed by: ANESTHESIOLOGY

## 2022-09-30 PROCEDURE — 6360000002 HC RX W HCPCS: Performed by: SURGERY

## 2022-09-30 PROCEDURE — 7100000011 HC PHASE II RECOVERY - ADDTL 15 MIN: Performed by: SURGERY

## 2022-09-30 PROCEDURE — 2580000003 HC RX 258: Performed by: SURGERY

## 2022-09-30 PROCEDURE — 2580000003 HC RX 258: Performed by: ANESTHESIOLOGY

## 2022-09-30 PROCEDURE — 2709999900 HC NON-CHARGEABLE SUPPLY: Performed by: SURGERY

## 2022-09-30 PROCEDURE — 2500000003 HC RX 250 WO HCPCS: Performed by: SURGERY

## 2022-09-30 PROCEDURE — C1781 MESH (IMPLANTABLE): HCPCS | Performed by: SURGERY

## 2022-09-30 PROCEDURE — 2500000003 HC RX 250 WO HCPCS: Performed by: ANESTHESIOLOGY

## 2022-09-30 PROCEDURE — 3600000009 HC SURGERY ROBOT BASE: Performed by: SURGERY

## 2022-09-30 PROCEDURE — 49650 LAP ING HERNIA REPAIR INIT: CPT | Performed by: SURGERY

## 2022-09-30 PROCEDURE — S2900 ROBOTIC SURGICAL SYSTEM: HCPCS | Performed by: SURGERY

## 2022-09-30 PROCEDURE — 3700000001 HC ADD 15 MINUTES (ANESTHESIA): Performed by: SURGERY

## 2022-09-30 PROCEDURE — 3600000019 HC SURGERY ROBOT ADDTL 15MIN: Performed by: SURGERY

## 2022-09-30 PROCEDURE — 7100000001 HC PACU RECOVERY - ADDTL 15 MIN: Performed by: SURGERY

## 2022-09-30 PROCEDURE — 6370000000 HC RX 637 (ALT 250 FOR IP)

## 2022-09-30 PROCEDURE — 7100000010 HC PHASE II RECOVERY - FIRST 15 MIN: Performed by: SURGERY

## 2022-09-30 PROCEDURE — 6360000002 HC RX W HCPCS

## 2022-09-30 PROCEDURE — 7100000000 HC PACU RECOVERY - FIRST 15 MIN: Performed by: SURGERY

## 2022-09-30 PROCEDURE — 3700000000 HC ANESTHESIA ATTENDED CARE: Performed by: SURGERY

## 2022-09-30 DEVICE — MESH SURG W3.5XL6IN POLY SELF FIXATING RECT W/ RESRB PLA: Type: IMPLANTABLE DEVICE | Site: ABDOMEN | Status: FUNCTIONAL

## 2022-09-30 RX ORDER — SODIUM CHLORIDE, SODIUM LACTATE, POTASSIUM CHLORIDE, CALCIUM CHLORIDE 600; 310; 30; 20 MG/100ML; MG/100ML; MG/100ML; MG/100ML
INJECTION, SOLUTION INTRAVENOUS CONTINUOUS
Status: DISCONTINUED | OUTPATIENT
Start: 2022-09-30 | End: 2022-09-30 | Stop reason: HOSPADM

## 2022-09-30 RX ORDER — SODIUM CHLORIDE 9 MG/ML
25 INJECTION, SOLUTION INTRAVENOUS PRN
Status: DISCONTINUED | OUTPATIENT
Start: 2022-09-30 | End: 2022-09-30 | Stop reason: HOSPADM

## 2022-09-30 RX ORDER — ROCURONIUM BROMIDE 10 MG/ML
INJECTION, SOLUTION INTRAVENOUS PRN
Status: DISCONTINUED | OUTPATIENT
Start: 2022-09-30 | End: 2022-09-30 | Stop reason: SDUPTHER

## 2022-09-30 RX ORDER — PROPOFOL 10 MG/ML
INJECTION, EMULSION INTRAVENOUS PRN
Status: DISCONTINUED | OUTPATIENT
Start: 2022-09-30 | End: 2022-09-30 | Stop reason: SDUPTHER

## 2022-09-30 RX ORDER — DOCUSATE SODIUM 100 MG/1
100 CAPSULE, LIQUID FILLED ORAL 2 TIMES DAILY
Qty: 20 CAPSULE | Refills: 0 | Status: SHIPPED | OUTPATIENT
Start: 2022-09-30

## 2022-09-30 RX ORDER — SODIUM CHLORIDE 9 MG/ML
INJECTION, SOLUTION INTRAVENOUS PRN
Status: DISCONTINUED | OUTPATIENT
Start: 2022-09-30 | End: 2022-09-30 | Stop reason: HOSPADM

## 2022-09-30 RX ORDER — ONDANSETRON 2 MG/ML
INJECTION INTRAMUSCULAR; INTRAVENOUS
Status: DISCONTINUED
Start: 2022-09-30 | End: 2022-09-30 | Stop reason: HOSPADM

## 2022-09-30 RX ORDER — LABETALOL HYDROCHLORIDE 5 MG/ML
10 INJECTION, SOLUTION INTRAVENOUS
Status: DISCONTINUED | OUTPATIENT
Start: 2022-09-30 | End: 2022-09-30 | Stop reason: HOSPADM

## 2022-09-30 RX ORDER — IPRATROPIUM BROMIDE AND ALBUTEROL SULFATE 2.5; .5 MG/3ML; MG/3ML
1 SOLUTION RESPIRATORY (INHALATION)
Status: DISCONTINUED | OUTPATIENT
Start: 2022-09-30 | End: 2022-09-30 | Stop reason: HOSPADM

## 2022-09-30 RX ORDER — GLYCOPYRROLATE 0.2 MG/ML
INJECTION INTRAMUSCULAR; INTRAVENOUS PRN
Status: DISCONTINUED | OUTPATIENT
Start: 2022-09-30 | End: 2022-09-30 | Stop reason: SDUPTHER

## 2022-09-30 RX ORDER — BUPIVACAINE HYDROCHLORIDE 2.5 MG/ML
INJECTION, SOLUTION INFILTRATION; PERINEURAL
Status: DISCONTINUED
Start: 2022-09-30 | End: 2022-09-30 | Stop reason: HOSPADM

## 2022-09-30 RX ORDER — SODIUM CHLORIDE 0.9 % (FLUSH) 0.9 %
5-40 SYRINGE (ML) INJECTION EVERY 12 HOURS SCHEDULED
Status: DISCONTINUED | OUTPATIENT
Start: 2022-09-30 | End: 2022-09-30 | Stop reason: HOSPADM

## 2022-09-30 RX ORDER — PROMETHAZINE HYDROCHLORIDE 25 MG/ML
6.25 INJECTION, SOLUTION INTRAMUSCULAR; INTRAVENOUS EVERY 5 MIN PRN
Status: DISCONTINUED | OUTPATIENT
Start: 2022-09-30 | End: 2022-09-30 | Stop reason: HOSPADM

## 2022-09-30 RX ORDER — SODIUM CHLORIDE 9 MG/ML
INJECTION, SOLUTION INTRAVENOUS CONTINUOUS
Status: DISCONTINUED | OUTPATIENT
Start: 2022-09-30 | End: 2022-09-30 | Stop reason: HOSPADM

## 2022-09-30 RX ORDER — OXYCODONE HYDROCHLORIDE AND ACETAMINOPHEN 5; 325 MG/1; MG/1
1 TABLET ORAL EVERY 6 HOURS PRN
Qty: 15 TABLET | Refills: 0 | Status: SHIPPED | OUTPATIENT
Start: 2022-09-30 | End: 2022-10-07

## 2022-09-30 RX ORDER — ONDANSETRON 2 MG/ML
INJECTION INTRAMUSCULAR; INTRAVENOUS PRN
Status: DISCONTINUED | OUTPATIENT
Start: 2022-09-30 | End: 2022-09-30 | Stop reason: SDUPTHER

## 2022-09-30 RX ORDER — OXYCODONE HYDROCHLORIDE AND ACETAMINOPHEN 5; 325 MG/1; MG/1
2 TABLET ORAL
Status: DISCONTINUED | OUTPATIENT
Start: 2022-09-30 | End: 2022-09-30 | Stop reason: HOSPADM

## 2022-09-30 RX ORDER — ONDANSETRON 2 MG/ML
4 INJECTION INTRAMUSCULAR; INTRAVENOUS
Status: COMPLETED | OUTPATIENT
Start: 2022-09-30 | End: 2022-09-30

## 2022-09-30 RX ORDER — DIPHENHYDRAMINE HYDROCHLORIDE 50 MG/ML
12.5 INJECTION INTRAMUSCULAR; INTRAVENOUS
Status: DISCONTINUED | OUTPATIENT
Start: 2022-09-30 | End: 2022-09-30 | Stop reason: HOSPADM

## 2022-09-30 RX ORDER — CEFAZOLIN 2 G/1
INJECTION, POWDER, FOR SOLUTION INTRAMUSCULAR; INTRAVENOUS
Status: DISCONTINUED
Start: 2022-09-30 | End: 2022-09-30 | Stop reason: HOSPADM

## 2022-09-30 RX ORDER — LIDOCAINE HYDROCHLORIDE 10 MG/ML
INJECTION, SOLUTION INFILTRATION; PERINEURAL PRN
Status: DISCONTINUED | OUTPATIENT
Start: 2022-09-30 | End: 2022-09-30 | Stop reason: SDUPTHER

## 2022-09-30 RX ORDER — MEPERIDINE HYDROCHLORIDE 50 MG/ML
12.5 INJECTION INTRAMUSCULAR; INTRAVENOUS; SUBCUTANEOUS EVERY 5 MIN PRN
Status: DISCONTINUED | OUTPATIENT
Start: 2022-09-30 | End: 2022-09-30 | Stop reason: HOSPADM

## 2022-09-30 RX ORDER — GLYCOPYRROLATE 0.2 MG/ML
0.4 INJECTION INTRAMUSCULAR; INTRAVENOUS ONCE
Status: DISCONTINUED | OUTPATIENT
Start: 2022-09-30 | End: 2022-09-30 | Stop reason: HOSPADM

## 2022-09-30 RX ORDER — MORPHINE SULFATE 2 MG/ML
2 INJECTION, SOLUTION INTRAMUSCULAR; INTRAVENOUS EVERY 5 MIN PRN
Status: DISCONTINUED | OUTPATIENT
Start: 2022-09-30 | End: 2022-09-30 | Stop reason: HOSPADM

## 2022-09-30 RX ORDER — LIDOCAINE HYDROCHLORIDE 10 MG/ML
INJECTION, SOLUTION EPIDURAL; INFILTRATION; INTRACAUDAL; PERINEURAL
Status: DISCONTINUED
Start: 2022-09-30 | End: 2022-09-30 | Stop reason: WASHOUT

## 2022-09-30 RX ORDER — SODIUM CHLORIDE 0.9 % (FLUSH) 0.9 %
5-40 SYRINGE (ML) INJECTION PRN
Status: DISCONTINUED | OUTPATIENT
Start: 2022-09-30 | End: 2022-09-30 | Stop reason: HOSPADM

## 2022-09-30 RX ORDER — OXYCODONE HYDROCHLORIDE AND ACETAMINOPHEN 5; 325 MG/1; MG/1
TABLET ORAL
Status: COMPLETED
Start: 2022-09-30 | End: 2022-09-30

## 2022-09-30 RX ORDER — NEOSTIGMINE METHYLSULFATE 5 MG/5 ML
SYRINGE (ML) INTRAVENOUS PRN
Status: DISCONTINUED | OUTPATIENT
Start: 2022-09-30 | End: 2022-09-30 | Stop reason: SDUPTHER

## 2022-09-30 RX ORDER — OXYCODONE HYDROCHLORIDE AND ACETAMINOPHEN 5; 325 MG/1; MG/1
1 TABLET ORAL
Status: COMPLETED | OUTPATIENT
Start: 2022-09-30 | End: 2022-09-30

## 2022-09-30 RX ORDER — MIDAZOLAM HYDROCHLORIDE 2 MG/2ML
2 INJECTION, SOLUTION INTRAMUSCULAR; INTRAVENOUS
Status: DISCONTINUED | OUTPATIENT
Start: 2022-09-30 | End: 2022-09-30 | Stop reason: HOSPADM

## 2022-09-30 RX ORDER — FENTANYL CITRATE 50 UG/ML
INJECTION, SOLUTION INTRAMUSCULAR; INTRAVENOUS PRN
Status: DISCONTINUED | OUTPATIENT
Start: 2022-09-30 | End: 2022-09-30 | Stop reason: SDUPTHER

## 2022-09-30 RX ADMIN — HYDROMORPHONE HYDROCHLORIDE 0.5 MG: 1 INJECTION, SOLUTION INTRAMUSCULAR; INTRAVENOUS; SUBCUTANEOUS at 12:57

## 2022-09-30 RX ADMIN — OXYCODONE HYDROCHLORIDE AND ACETAMINOPHEN 1 TABLET: 5; 325 TABLET ORAL at 13:45

## 2022-09-30 RX ADMIN — ONDANSETRON 4 MG: 2 INJECTION INTRAMUSCULAR; INTRAVENOUS at 11:28

## 2022-09-30 RX ADMIN — Medication 5 MG: at 12:42

## 2022-09-30 RX ADMIN — FENTANYL CITRATE 50 MCG: 50 INJECTION, SOLUTION INTRAMUSCULAR; INTRAVENOUS at 11:45

## 2022-09-30 RX ADMIN — FENTANYL CITRATE 50 MCG: 50 INJECTION, SOLUTION INTRAMUSCULAR; INTRAVENOUS at 12:06

## 2022-09-30 RX ADMIN — LIDOCAINE HYDROCHLORIDE 20 MG: 10 INJECTION, SOLUTION INFILTRATION; PERINEURAL at 11:45

## 2022-09-30 RX ADMIN — PROPOFOL 180 MG: 10 INJECTION, EMULSION INTRAVENOUS at 11:45

## 2022-09-30 RX ADMIN — ROCURONIUM BROMIDE 40 MG: 10 INJECTION, SOLUTION INTRAVENOUS at 11:45

## 2022-09-30 RX ADMIN — SODIUM CHLORIDE, POTASSIUM CHLORIDE, SODIUM LACTATE AND CALCIUM CHLORIDE: 600; 310; 30; 20 INJECTION, SOLUTION INTRAVENOUS at 10:28

## 2022-09-30 RX ADMIN — GLYCOPYRROLATE 0.8 MG: 0.2 INJECTION INTRAMUSCULAR; INTRAVENOUS at 12:42

## 2022-09-30 RX ADMIN — HYDROMORPHONE HYDROCHLORIDE 0.5 MG: 1 INJECTION, SOLUTION INTRAMUSCULAR; INTRAVENOUS; SUBCUTANEOUS at 13:09

## 2022-09-30 RX ADMIN — CEFAZOLIN 2000 MG: 2 INJECTION, POWDER, FOR SOLUTION INTRAMUSCULAR; INTRAVENOUS at 11:55

## 2022-09-30 RX ADMIN — ONDANSETRON 4 MG: 2 INJECTION INTRAMUSCULAR; INTRAVENOUS at 12:56

## 2022-09-30 ASSESSMENT — PAIN DESCRIPTION - DESCRIPTORS
DESCRIPTORS: PRESSURE
DESCRIPTORS: ACHING;BURNING;PRESSURE
DESCRIPTORS: PRESSURE

## 2022-09-30 ASSESSMENT — PAIN SCALES - GENERAL
PAINLEVEL_OUTOF10: 7
PAINLEVEL_OUTOF10: 7
PAINLEVEL_OUTOF10: 8
PAINLEVEL_OUTOF10: 8
PAINLEVEL_OUTOF10: 7

## 2022-09-30 ASSESSMENT — PAIN DESCRIPTION - LOCATION
LOCATION: ABDOMEN

## 2022-09-30 ASSESSMENT — LIFESTYLE VARIABLES: SMOKING_STATUS: 1

## 2022-09-30 ASSESSMENT — PAIN - FUNCTIONAL ASSESSMENT: PAIN_FUNCTIONAL_ASSESSMENT: NONE - DENIES PAIN

## 2022-09-30 NOTE — ANESTHESIA POSTPROCEDURE EVALUATION
Department of Anesthesiology  Postprocedure Note    Patient: Julio Marquis  MRN: [id number]  YOB: 1960  Date of evaluation: 9/30/2022      Procedure Summary     Date: 09/30/22 Room / Location: [facility] OR 05 / [address]    Anesthesia Start: 0680 Anesthesia Stop: 1250    Procedure: RIGHT INGUINAL  HERNIA REPAIR LAPAROSCOPIC ROBOTIC WITH MESH (Right: Abdomen) Diagnosis:       Right inguinal hernia      (Right inguinal hernia [K40.90])    Surgeons: Jessica Harding DO Responsible Provider: Carley Novoa MD    Anesthesia Type: general ASA Status: 3          Anesthesia Type: No value filed.     Anuj Phase I: Anuj Score: 8    Anuj Phase II:        Anesthesia Post Evaluation    Patient location during evaluation: PACU  Patient participation: complete - patient participated  Level of consciousness: awake and alert  Airway patency: patent  Nausea & Vomiting: no nausea and no vomiting  Complications: no  Cardiovascular status: hemodynamically stable  Respiratory status: room air and spontaneous ventilation  Hydration status: euvolemic  Multimodal analgesia pain management approach

## 2022-09-30 NOTE — DISCHARGE INSTRUCTIONS
Patient Discharge Instructions  Discharge Date:  9/30/2022    HYGEINE: [address] [address]  to shower 10/01/22, no soaking in a tub/pool until seen at your follow up appointment. Clean incision daily with gentle soap and water, do not use alcohol/peroxide or any harsh cleansers. DRIVING: No driving while taking narcotic medications or while in pain    ACTIVITY: No lifting greater than 20lbs for 6 weeks or any strenuous activity. DIET: Resume your normal diet as advised by your PCP. MEDICATIONS: Take all medications as prescribed. Take Colace until you have your first bowel movement, continue to take if you are using narcotics for pain control    SPECIAL INSTRUCTIONS:     Your scrotum may appear larger than normal after surgery, this is due to the gas (carbon dioxide) that was used in order to repair the hernia. This gas will slowly be reabsorbed by the body over the next several days. You may experience constipation for several days after surgery. The medications used for your anesthesia can have a constipating effect, the constipation is made worse by narcotic medications. Continue to eat normally and take the stool softener as prescribed, it is okay to use an over-the-counter laxative as well if you have not had a bowel movement after 2-3 days post surgery. If you have not been able to urinate within 4-6 hours (5p-7p)since the completion of surgery(1pm) then return to the ED for evaluation of your bladder  Follow up with Dr. Amelia Tamayo in 10-14 days, call the clinic for appointment. Call sooner if fever above 100.4 degrees Farenheit, increase in swelling or redness of the groin or incision sites, or pain not controlled with medications. Activity   You have had anesthesia today  Do not drive, operate heavy equipment, consume alcoholic beverages, or make any important decisions  for 24 hours   If you are taking pain medication: Do not drive or consume alcohol. Take your time changing positions today.  You may feel light headed or dizzy if you move too quickly. Continue your home medications as ordered by your physician. Diet   You can eat your normal diet when you feel well. You should start off with bland foods like chicken soup, toast, or yogurt. Then advance as tolerated. Drink plenty of fluids (unless your doctor tells you not to). Your urine should be very lightly colored without a strong odor.

## 2022-09-30 NOTE — ANESTHESIA PRE PROCEDURE
Department of Anesthesiology  Preprocedure Note       Name:  Sandeep Tan   Age:  63 y.o.  :  1960                                          MRN:  [id number]         Date:  2022      Surgeon: Senait Melton):  Ananth Gallegos DO    Procedure: Procedure(s):  RIGHT INGUINAL  HERNIA REPAIR LAPAROSCOPIC ROBOTIC WITH MESH    Medications prior to admission:   Prior to Admission medications    Medication Sig Start Date End Date Taking? Authorizing Provider   oxyCODONE-acetaminophen (PERCOCET) 5-325 MG per tablet Take 1 tablet by mouth every 6 hours as needed for Pain for up to 7 days. Intended supply: 7 days. Take lowest dose possible to manage pain 9/30/22 10/7/22 Yes Ananth Gallegos DO   docusate sodium (COLACE) 100 MG capsule Take 1 capsule by mouth 2 times daily 22  Yes Ananth Gallegos DO   vitamin D (ERGOCALCIFEROL) 1.25 MG (42811 UT) CAPS capsule TAKE ONE CAPSULE BY MOUTH ONCE WEEKLY 22   Mike Avendano MD   atorvastatin (LIPITOR) 20 MG tablet TAKE ONE TABLET BY MOUTH DAILY 22   Mike Avendano MD   ibuprofen (ADVIL;MOTRIN) 600 MG tablet  22   Historical Provider, MD   nicotine (NICODERM CQ) 21 MG/24HR Place 1 patch onto the skin daily 9/15/22 10/27/22  Mike Avendano MD   albuterol (PROVENTIL) (2.5 MG/3ML) 0.083% nebulizer solution Take 3 mLs by nebulization every 4 hours as needed for Wheezing or Shortness of Breath 22  Mike Avendano MD   albuterol (PROVENTIL) (5 MG/ML) 0.5% nebulizer solution Take 0.5 mLs by nebulization 4 times daily as needed for Wheezing 22   Mike Avendano MD   albuterol sulfate HFA (PROVENTIL;VENTOLIN;PROAIR) 108 (90 Base) MCG/ACT inhaler Inhale 2 puffs into the lungs every 4 hours as needed for Wheezing 8/10/22   Mike Avendano MD   fluticasone-salmeterol (ADVAIR) 250-50 MCG/ACT AEPB diskus inhaler Inhale 1 puff into the lungs in the morning and 1 puff in the evening.  8/10/22   Mike Avendano MD   aspirin 81 MG EC tablet Take 81 mg by mouth daily    Historical Provider, MD       Current medications:    Current Facility-Administered Medications   Medication Dose Route Frequency Provider Last Rate Last Admin    ceFAZolin (ANCEF) 2,000 mg in sodium chloride 0.9 % 50 mL IVPB (mini-bag)  2,000 mg IntraVENous On Call to OR Oleta Maisha, DO        0.9 % sodium chloride infusion   IntraVENous Continuous Paul Mliler MD        lactated ringers infusion   IntraVENous Continuous Paul Miller  mL/hr at 09/30/22 1028 New Bag at 09/30/22 1028    sodium chloride flush 0.9 % injection 5-40 mL  5-40 mL IntraVENous 2 times per day Paul Miller MD        sodium chloride flush 0.9 % injection 5-40 mL  5-40 mL IntraVENous PRN Paul Miller MD        0.9 % sodium chloride infusion   IntraVENous PRN Paul Miller MD        ceFAZolin (ANCEF) 2 g injection                Allergies:  No Known Allergies    Problem List:    Patient Active Problem List   Diagnosis Code    Primary osteoarthritis of left hip M16.12    Prediabetes R73.03    Tobacco abuse Z72.0    Chronic hepatitis C without hepatic coma ([facility] B18.2    Pure hypercholesterolemia E78.00    Vitamin D deficiency E55.9    Other emphysema ([facility] J43.8       Past Medical History:        Diagnosis Date    COPD (chronic obstructive pulmonary disease) ([facility]     emphysema    Gingival and periodontal disease     chronic    Hyperlipidemia     Primary osteoarthritis of both hips     Primary osteoarthritis of left hip 06/22/2017    Seasonal allergies        Past Surgical History:        Procedure Laterality Date    COLONOSCOPY         Social History:    Social History     Tobacco Use    Smoking status: Every Day     Packs/day: 0.25     Years: 44.00     Pack years: 11.00     Types: Cigarettes     Start date: 1/1/1976    Smokeless tobacco: Never   Substance Use Topics    Alcohol use: Yes     Comment: occassionly                                 Ready to quit: Not Answered  Counseling given: Not Answered      Vital Signs (Current):   Vitals: 09/30/22 1016   BP: 138/89   Pulse: 83   Resp: 16   Temp: 97.2 °F (36.2 °C)   SpO2: 95%   Weight: 169 lb (76.7 kg)   Height: 5' 9\" (1.753 m)                                              BP Readings from Last 3 Encounters:   09/30/22 138/89   09/29/22 136/85   09/27/22 129/83       NPO Status: Time of last liquid consumption: 0800                        Time of last solid consumption: 2100                        Date of last liquid consumption: 09/30/22                        Date of last solid food consumption: 09/29/22    BMI:   Wt Readings from Last 3 Encounters:   09/30/22 169 lb (76.7 kg)   09/29/22 171 lb (77.6 kg)   09/27/22 172 lb (78 kg)     Body mass index is 24.96 kg/m². CBC:   Lab Results   Component Value Date/Time    WBC 9.6 05/19/2022 08:23 AM    RBC 5.50 05/19/2022 08:23 AM    HGB 15.9 05/19/2022 08:23 AM    HCT 50.5 05/19/2022 08:23 AM    MCV 91.8 05/19/2022 08:23 AM    RDW 14.6 05/19/2022 08:23 AM     05/19/2022 08:23 AM       CMP:   Lab Results   Component Value Date/Time     05/19/2022 08:23 AM    K 4.0 05/19/2022 08:23 AM     05/19/2022 08:23 AM    CO2 22 05/19/2022 08:23 AM    BUN 15 05/19/2022 08:23 AM    CREATININE 0.93 05/19/2022 08:23 AM    GFRAA >60 05/19/2022 08:23 AM    LABGLOM >60 05/19/2022 08:23 AM    PROT 7.2 05/19/2022 08:23 AM    CALCIUM 9.4 05/19/2022 08:23 AM    BILITOT 0.46 05/19/2022 08:23 AM    ALKPHOS 63 05/19/2022 08:23 AM    AST 18 05/19/2022 08:23 AM    ALT 19 05/19/2022 08:23 AM       POC Tests: No results for input(s): POCGLU, POCNA, POCK, POCCL, POCBUN, POCHEMO, POCHCT in the last 72 hours.     Coags: No results found for: PROTIME, INR, APTT    HCG (If Applicable): No results found for: PREGTESTUR, PREGSERUM, HCG, HCGQUANT     ABGs: No results found for: PHART, PO2ART, HRW8XUJ, GQP2SWX, BEART, F4TRQTED     Type & Screen (If Applicable):  No results found for: LABABO, LABRH    Drug/Infectious Status (If Applicable):  Lab Results   Component Value Date/Time    HEPCAB REACTIVE 05/26/2021 04:13 PM       COVID-19 Screening (If Applicable): No results found for: COVID19        Anesthesia Evaluation  Patient summary reviewed and Nursing notes reviewed  Airway: Mallampati: III  TM distance: >3 FB   Neck ROM: full  Mouth opening: > = 3 FB   Dental:      Comment: -UPPER EDENTULOUS  -MISSING SOME LOWER TEETH BILATERALLY AND ONE LOWER FRONT    Pulmonary:normal exam    (+) COPD:  asthma: current smoker                          ROS comment: -SMOKES 1 PPF OR 46 YEARS  -ASTHMA - CONTROLLED; INFREQUENT ALBUTEROL USE   Cardiovascular:Negative CV ROS          ECG reviewed                     ROS comment: -EKG - SR @ 76, LEFT ATRIAL ENLARGEMENT     Neuro/Psych:   Negative Neuro/Psych ROS              GI/Hepatic/Renal: Neg GI/Hepatic/Renal ROS  (+) hepatitis: C, liver disease:,           Endo/Other:    (+) : arthritis:., .                  ROS comment: -MARIJUANA USE  -NPO AFTER MIDNIGHT  -NKDA Abdominal:             Vascular: negative vascular ROS. Other Findings:           Anesthesia Plan      general     ASA 3     (GETA)  Induction: intravenous. MIPS: Postoperative opioids intended and Prophylactic antiemetics administered. Anesthetic plan and risks discussed with patient. Plan discussed with CRNA.     Attending anesthesiologist reviewed and agrees with Edilia Thomas MD   9/30/2022

## 2022-09-30 NOTE — H&P
[address]      Patient's Name/ Date of Birth/ Gender: Keila Gautam / 1960 (63 y.o.) / male     Attending physician: Juanita Hughes DO    CC: right inguinal hernia    History of present Illness: Keila Gautam is a 63 y.o. male, presents today for [facility] repair. Past Medical History:  has a past medical history of COPD (chronic obstructive pulmonary disease) ([address] Gingival and periodontal disease, Hyperlipidemia, Primary osteoarthritis of both hips, Primary osteoarthritis of left hip, and Seasonal allergies. Past Surgical History:   Past Surgical History:   Procedure Laterality Date    COLONOSCOPY         Social History:  reports that he has been smoking cigarettes. He started smoking about 46 years ago. He has a 11.00 pack-year smoking history. He has never used smokeless tobacco. He reports current alcohol use. He reports current drug use. Drug: Marijuana Evy Gilbert). Family History: family history includes Coronary Art Dis in his mother; Dementia in his mother; Diabetes in his brother; Heart Attack in his father; Other in his mother. Review of Systems:   General: Denies fever, chills, night sweats, weight loss, malaise, fatigue  HEENT: Denies sore throat, sinus problems, allergic rhinosinusitis  Card: Denies chest pain, palpitations, orthopnea/PND. Denies h/o murmurs  Pulm: Denies cough, shortness of breath, BARDALES  GI:   Denies history of constipation, diarrhea, hematochezia or melena  : Denies polyuria, dysuria, hematuria  Endo: Denies diabetes, thyroid problems. Heme: Denies anemia, h/o bleeding or clotting problems. Neuro: Denies h/o CVA, TIA  Skin: Denies rashes, ulcers  Musculoskeletal: Denies muscle, joint, back pain. Allergies: Patient has no known allergies. Current Meds:No current facility-administered medications for this encounter. Vital Signs: There were no vitals filed for this visit.     Physical Exam:  Vital signs and Nurse's note reviewed  Gen:  A&Ox3, NAD  HEENT: NCAT, PERRLA, EOMI, no scleral icterus, oral mucosa moist  Neck: Trachea midline without obvious masses or lesions  Chest: Symmetric rise with inhalation, no evidence of trauma  CVS: S1S2  Resp: Good bilateral air entry, no audible wheeze or rhonchi  Abd: soft, non-tender, non-distended, no hepatosplenomegaly or palpable masses  Ext: No clubbing, cyanosis, edema, peripheral pulses 2+ Rad/Fem/DP/PT  CNS: Moves all extremities, no gross focal motor deficits  Skin: No erythema or ulcerations       Assessment:    Laya Christie is a 63 y.o. male with     RIH    Plan:     To OR for [facility] repair with mesh, all questions answered, written informed consent obtained      Avelino Babb DO  9/30/2022

## 2022-10-01 ASSESSMENT — ENCOUNTER SYMPTOMS
COUGH: 0
NAUSEA: 0
VOMITING: 0
DIARRHEA: 0
EYE DISCHARGE: 0
ABDOMINAL PAIN: 0
CHEST TIGHTNESS: 0
SHORTNESS OF BREATH: 0
WHEEZING: 0
SORE THROAT: 0
CONSTIPATION: 0

## 2022-10-01 NOTE — OP NOTE
Operative Note      Patient: Marnie Miranda  YOB: 1960  MRN: [id number]    Date of Procedure: 9/30/2022    Pre-Op Diagnosis: Right inguinal hernia [K40.90]    Post-Op Diagnosis:   Right direct inguinal hernia, incarcerated       Procedure(s):  RIGHT INGUINAL  HERNIA REPAIR LAPAROSCOPIC ROBOTIC WITH MESH    Surgeon(s):  Harrison Sow DO    Assistant:   * No surgical staff found *    Anesthesia: General    Estimated Blood Loss (mL): Minimal    Complications: None    Specimens:   * No specimens in log *    Implants:  Implant Name Type Inv. Item Serial No.  Lot No. LRB No. Used Action   MESH SURG W3.5XL6IN POLY SELF FIXATING RECT W/ RESRB EULALIO - [id number]  MESH SURG W3.5XL6IN POLY SELF FIXATING RECT W/ RESRB EULALIO  [employer]  SURGICAL-WD [id number] Right 1 Implanted         Drains: * No LDAs found *    Findings: as above. Wound class 1    Detailed Description of Procedure:       HISTORY: The patient is a 63y.o. year old male with history of above preop diagnosis. The risk, benefits, expected outcome, and alternatives to the procedure were explained to the patient's understanding and written informed consent was obtained. DESCRIPTION OF PROCEDURE:  Patient was brought to the operating suite and placed on the operating table in supine position. Timeout was performed verifying correct patient, position, equipment and procedure to be performed. EPC cuffs were applied and preoperative antibiotics were infused. General anesthesia was administered and the patient was endotracheally intubated. The patient's abdomen including genitalia was prepped and draped in the usual sterile fashion. 1cm incision was made in the left upper quadrant at Mccormack's point and Veress needle was inserted. A saline drop test was used to confirm entrance into the abdomen. Pneumoperitoneum was created with insufflation of carbon dioxide to 15 mmHg.       An 8 mmHg port was placed in the RUQ abdomen using an Optiview obturator with no damage to the underlying structures. Remaining ports were placed under direct visualization along the same line in the midline abdomen and the LUQ abdomen. Robot was docked without complication. A 30 degree scope was placed into the abdomen. Both inguinal regions were inspected and the median umbilical ligament, medial umbilical ligaments, and lateral umbilical folds were identified. Inguinal hernia noted on right side. The peritoneum was incised with scissor electrocautery along a line 2 cm above the ASIS to the medial umbilical ligament. The peritoneal flap was mobilized inferiorly using blunt dissection, this extended from the lateral edge of the internal ring to the exposing the medial and inferior aspects of the pubic tubercle. The indirect, direct and femoral hernia spaces were visualized. Hernias were found in the direct space. The inferior epigastric vessels were exposed and kept superior to the dissection planes at all times during the operation. Incarcerated preperitoneal fat was reduced from the direct space without issue. The indirect space was cleared of overlying fascia and tissues to create space for mesh placement. Once dissection was completed, a 15 cm x 9 cm piece Progrip mesh was rolled double scroll. It was placed within the peritoneum flap and centered over the internal inguinal ring to cover the indirect, direct and femoral hernia spaces, this mesh also overlaps the pubic tubercle by 2cm medially and inferiorly. It was noted to lay flat, in good position and without crimpage. The peritoneum was closed with running absorbable 3-0 V lock suture. Robot was undocked without complications. The 8mm robotic ports were removed under direct visualization. No bleeding was noted. Skin incisions were closed with subcuticular 4-0 Monocryl suture.      Combination of 1% lidocaine and 0.25% marcaine without epinephrine was used to anesthetize the skin incisions. Incisions were covered with skin glue. Patient tolerated the procedure well. There were no complications. All instruments and sponges were accounted for. Bilateral testes were present in the scrotum at the end of the case. Patient was extubated and taken to recovery room in stable condition.          Electronically signed by Lito Pyle DO on 10/1/2022 at 8:22 AM

## 2022-10-07 DIAGNOSIS — J43.8 OTHER EMPHYSEMA (HCC): ICD-10-CM

## 2022-10-07 DIAGNOSIS — E55.9 VITAMIN D DEFICIENCY: ICD-10-CM

## 2022-10-10 ENCOUNTER — OFFICE VISIT (OUTPATIENT)
Dept: SURGERY | Age: 62
End: 2022-10-10

## 2022-10-10 VITALS
RESPIRATION RATE: 16 BRPM | SYSTOLIC BLOOD PRESSURE: 144 MMHG | WEIGHT: 169 LBS | HEART RATE: 88 BPM | OXYGEN SATURATION: 100 % | BODY MASS INDEX: 25.03 KG/M2 | DIASTOLIC BLOOD PRESSURE: 81 MMHG | HEIGHT: 69 IN

## 2022-10-10 DIAGNOSIS — Z98.890 STATUS POST INGUINAL HERNIA REPAIR: Primary | ICD-10-CM

## 2022-10-10 DIAGNOSIS — Z87.19 STATUS POST INGUINAL HERNIA REPAIR: Primary | ICD-10-CM

## 2022-10-10 PROCEDURE — 99024 POSTOP FOLLOW-UP VISIT: CPT | Performed by: SURGERY

## 2022-10-10 RX ORDER — ERGOCALCIFEROL 1.25 MG/1
CAPSULE ORAL
Qty: 4 CAPSULE | Refills: 2 | Status: SHIPPED | OUTPATIENT
Start: 2022-10-10

## 2022-10-10 RX ORDER — ALBUTEROL SULFATE 2.5 MG/3ML
SOLUTION RESPIRATORY (INHALATION)
Qty: 75 ML | Refills: 1 | Status: SHIPPED
Start: 2022-10-10 | End: 2022-10-12 | Stop reason: ALTCHOICE

## 2022-10-10 NOTE — PROGRESS NOTES
[facility] General Surgery   History & Physical  Chris Niño DO    PATIENT NAME: [facility] VISIT DATE: 10/10/2022    SUBJECTIVE:  Sheela Whaley is a 63 y.o. male who presents to the clinic today for follow up to [facility] repair with mesh performed 9/30/22. No new issues since surgery, pt is tolerating regular diet and having normal BM. OBJECTIVE:    BP (!) 144/81   Pulse 88   Resp 16   Ht 5' 9\" (1.753 m)   Wt 169 lb (76.7 kg)   SpO2 100%   BMI 24.96 kg/m²     General Appearance:  awake, alert, no acute distress, well developed, well nourished   Skin:  Skin color, texture, turgor normal. No rashes or lesions. Lungs:  Normal chest expansion, unlabored breathing without accessory muscle use. No audible rales, rhonchi, or wheezing. Cardiovascular: S1S2. No evidence of JVD. No evidence of pulsatile masses in abdomen  Abdomen:  Soft, non-tender, no organomegaly, no masses. Incisions C/D/I without evidence of bleeding/infection  Musculoskeletal: No evidence of bony/muscular deformities, trauma, atrophy of either left/right upper/lower extremity. No evidence of digital clubbing or cyanosis. ASSESSMENT:  1. Status post inguinal hernia repair          PLAN:  Lifting restriction to less than 20lbs for the next 4 weeks, unrestricted after this.   F/U prn    Electronically signed by Chris Niño DO on 10/10/2022 at 2:50 PM

## 2022-10-10 NOTE — LETTER
[facility]  Surgical Specialties - General Surgery  [address]  [place], [address]  Phone: [phone]  Fax: [phone]      10/10/22    Patient: Alba Lanes  MRN: [id number]  : 1960  Date of visit: 10/10/2022    Dear Fouzia Escobar MD:        I saw Alba Lanes in follow up to [facility] repair with mesh, he is doing well. Below are the relevant portions of my assessment and plan of care. If you have questions, please do not hesitate to call me. I look forward to following Alba Lanes along with you.     Sincerely,        Sofia Albert, DO

## 2022-10-10 NOTE — TELEPHONE ENCOUNTER
Chanell Ferguson is calling to request a refill on the following medication(s):    Medication Request:  Requested Prescriptions     Pending Prescriptions Disp Refills    vitamin D (ERGOCALCIFEROL) 1.25 MG (65218 UT) CAPS capsule [Pharmacy Med Name: VIT D2 (ERGOCAL) 1.25MG(50,000U) CP] 4 capsule 2     Sig: TAKE ONE CAPSULE BY MOUTH ONCE WEEKLY    albuterol (PROVENTIL) (2.5 MG/3ML) 0.083% nebulizer solution [Pharmacy Med Name: ALBUTEROL 0.083% INH SOL (25 VIALS)] 75 mL      Sig: TAKE THREE MILLILITERS VIA NEBULIZATION EVERY 4 HOURS AS NEEDED FOR WHEEZING OR SHORTNESS OF BREATH       Last Visit Date (If Applicable):  0/37/2291    Next Visit Date:    3/30/2023

## 2022-10-12 ENCOUNTER — OFFICE VISIT (OUTPATIENT)
Dept: PULMONOLOGY | Age: 62
End: 2022-10-12
Payer: MEDICARE

## 2022-10-12 VITALS
DIASTOLIC BLOOD PRESSURE: 65 MMHG | BODY MASS INDEX: 25.59 KG/M2 | RESPIRATION RATE: 16 BRPM | TEMPERATURE: 97.9 F | SYSTOLIC BLOOD PRESSURE: 125 MMHG | WEIGHT: 172.8 LBS | OXYGEN SATURATION: 95 % | HEIGHT: 69 IN | HEART RATE: 84 BPM

## 2022-10-12 DIAGNOSIS — J44.9 STAGE 1 MILD COPD BY GOLD CLASSIFICATION (HCC): Primary | ICD-10-CM

## 2022-10-12 PROCEDURE — G8482 FLU IMMUNIZE ORDER/ADMIN: HCPCS | Performed by: INTERNAL MEDICINE

## 2022-10-12 PROCEDURE — 3023F SPIROM DOC REV: CPT | Performed by: INTERNAL MEDICINE

## 2022-10-12 PROCEDURE — 4004F PT TOBACCO SCREEN RCVD TLK: CPT | Performed by: INTERNAL MEDICINE

## 2022-10-12 PROCEDURE — 99214 OFFICE O/P EST MOD 30 MIN: CPT | Performed by: INTERNAL MEDICINE

## 2022-10-12 PROCEDURE — G8417 CALC BMI ABV UP PARAM F/U: HCPCS | Performed by: INTERNAL MEDICINE

## 2022-10-12 PROCEDURE — G8427 DOCREV CUR MEDS BY ELIG CLIN: HCPCS | Performed by: INTERNAL MEDICINE

## 2022-10-12 PROCEDURE — 3017F COLORECTAL CA SCREEN DOC REV: CPT | Performed by: INTERNAL MEDICINE

## 2022-10-12 RX ORDER — NICOTINE 21 MG/24HR
PATCH, TRANSDERMAL 24 HOURS TRANSDERMAL
COMMUNITY
Start: 2022-10-10

## 2022-10-12 RX ORDER — IPRATROPIUM BROMIDE AND ALBUTEROL SULFATE 2.5; .5 MG/3ML; MG/3ML
1 SOLUTION RESPIRATORY (INHALATION) EVERY 4 HOURS
Qty: 360 ML | Refills: 11 | Status: SHIPPED | OUTPATIENT
Start: 2022-10-12

## 2022-10-12 NOTE — PROGRESS NOTES
PULMONARY MEDICINE OUTPATIENT  FOLLOW UP NOTE                                                                       PATIENT:  Chaya Blankenship  YOB: 1960  MRN: [id number]     REFERRED BY: Digna Brink MD   CHIEF COMPLIANT: COPD (COPD, refill on premixed nebulizer solution)       HISTORY     HISTORY OF PRESENT ILLNESS:   Chaya Blankenship is a 63y.o. year old male here for follow up    COPD/Tobacco abuse/mediastinal adenopathy:  Patient  reports that he has been smoking cigarettes. He started smoking about 46 years ago. He has a 44.00 pack-year smoking history. He has never used smokeless tobacco.  He has chronic cough and sputum. PFT (May 2017) showed mild obstructive impairment. His lung screening CT (6/1/21) was reported to have 1.2 cm precarinal lymphadenopathy, which showed interval decrease in the size of precarinal LN on the CT chest (9/1/2021) and was not reported on CT chest (8/18/2022). He also had a PET scan (8/4/2022) showed no FDG avidity in the precarinal lymph node. Patient is currently on Wixela inhaler and as needed albuterol. He reports that he bought a nebulizer on his own and is requesting refills for aerosol treatment. PAST MEDICAL HISTORY:      Diagnosis Date    COPD (chronic obstructive pulmonary disease) ([address]     emphysema    Gingival and periodontal disease     chronic    Hyperlipidemia     Primary osteoarthritis of both hips     Primary osteoarthritis of left hip 06/22/2017    Seasonal allergies      PAST SURGICAL HISTORY:      Procedure Laterality Date    COLONOSCOPY      HERNIA REPAIR Right 9/30/2022    RIGHT INGUINAL  HERNIA REPAIR LAPAROSCOPIC ROBOTIC WITH MESH performed by Lito Pyle DO at [facility] Right 09/30/2022    RIGHT INGUINAL  HERNIA REPAIR LAPAROSCOPIC ROBOTIC WITH MESH     SOCIAL HISTORY:  TOBACCO:   reports that he has been smoking cigarettes. He started smoking about 46 years ago.  He has a 44.00 pack-year smoking history. He has never used smokeless tobacco.  ETOH:   reports current alcohol use. DRUGS: reports current drug use. Drug: Marijuana Kimberleyjosé miguel Kaur).      AVOCATION/OCCUPATIONAL EXPOSURE:  [] Asbestos      [] Hot tub  [] Silica dust    [] Pets  [] Coal            [] Exotic birds  [] [employer]       [] Tuberculosis  [] Merna Conte         [] Others  [] [place]          PHYSICAL EXAMINATION      VITAL SIGNS:   /65 (Site: Right Upper Arm)   Pulse 84   Temp 97.9 °F (36.6 °C)   Resp 16   Ht 5' 9\" (1.753 m)   Wt 172 lb 12.8 oz (78.4 kg)   SpO2 95% Comment: room air at rest  BMI 25.52 kg/m²   Wt Readings from Last 3 Encounters:   10/12/22 172 lb 12.8 oz (78.4 kg)   10/10/22 169 lb (76.7 kg)   09/27/22 172 lb (78 kg)     SYSTEMIC EXAMINATION:  General appearance -  [x] well appearing  [x] overweight  [] obese [] cachectic [x] comfortable  [x] in no acute distress  [] chronically ill-appearing  [] in mild to moderate respiratory distress  Mental status -  [x] alert  [x] oriented to person, place, and time  [] anxious  [] depressed mood   Head-  [x] atraumatic  [x] normocephalic  Eyes -  [] pupils equal and reactive, extraocular eye movements intact  [] sclera anicteric  Ears -  [x] hearing grossly normal bilaterally [] bilateral TM's and external ear canals normal  Nose -  [x] normal and patent [] no erythema  [] discharge  Mouth -  [x] mucous membranes moist  [] pharynx normal without lesions [] erythematous  [] exudate noted  Mallampati Airway Score -  [] I (soft palate, uvula, fauces, tonsillar pillars visible) [] II (soft palate, uvula, fauces visible) []  III (soft palate, base of uvula visible) [] IV (only hard palate visible)  Neck -  [] supple  [] no significant adenopathy  [] carotids upstroke normal bilaterally [] no JVD  [] no bruit  Lymphatics -  [x] no palpable lymphadenopathy  [] no hepatosplenomegaly  Chest -   [x] normal chest excursion  [x] no chest wall tenderness  [] increased AP diameter[] pectus noted [] scoliosis noted [x] no tachypnea retraction or cyanosis [x] clear to auscultation, no wheezes, rales or rhonchi, symmetric air entry  [] wheezing noted  [] rales noted  []rhonchi noted [] decreased air entry noted bilaterally  Heart -  [x] normal rate,  [x] regular rhythm  [] irregularly irregular rhythm [x] normal S1  [x] normal S2  [x] no murmurs, rubs, clicks or gallops  [] S3 present  [] S4 present  [] systolic murmur  [] diastolic murmur  [] midsystolic click []pericardial rub present   Abdomen -  [] soft [] nontender  [] nondistended  [] no masses or organomegaly  Neurological -  [x] normal speech  [x] no focal findings or movement disorder noted  [] cranial nerves II through XII intact  [] DTR's normal and symmetric  [] Babinski sign negative,  [x] motor and sensory grossly normal bilaterally  [] normal muscle tone [x] no tremors  [] strength 5/5  [] Romberg sign negative [] normal gait   Musculoskeletal -  [x] no joint tenderness [x] no deformity or swelling  Extremities - [x] no clubbing or cyanosis,  [x] no pedal edema [] pedal edema  [] intact peripheral pulses  Skin -  [x] normal coloration and turgor  [x] no rashes  [] no suspicious skin lesions noted          MEDICAL DECISION MAKING     PROBLEMS ADDRESSED (NUMBER AND COMPLEXITY)       ICD-10-CM    1. Stage 1 mild COPD by GOLD classification ([facility] [address]  J44.9 ipratropium-albuterol (DUONEB) 0.5-2.5 (3) MG/3ML SOLN nebulizer solution     Full PFT Study With Bronchodilator           2.  DATA REVIEWED AND ANALYZED (AMOUNT AND/OR COMPLEXITY)   LABS:  ABG:   No results found for: PH, PHART, PCO2, RGW4MRK, PO2, PO2ART, HCO3, PXC0XOT, BE, BEART, THGB, B5VIADOR  VBG:  No results found for: PHVEN, BCL0EGG, BEVEN, N9FNWEQI  CBC:   Lab Results   Component Value Date    WBC 9.6 05/19/2022    RBC 5.50 05/19/2022    HGB 15.9 05/19/2022    HCT 50.5 (H) 05/19/2022     05/19/2022    MCV 91.8 05/19/2022    MCH 28.9 05/19/2022    MCHC 31.5 05/19/2022    RDW 14.6 (H) 05/19/2022    LYMPHOPCT 23 (L) 05/19/2022    MONOPCT 9 05/19/2022    BASOPCT 1 05/19/2022    MONOSABS 0.82 05/19/2022    LYMPHSABS 2.22 05/19/2022    EOSABS 0.21 05/19/2022    BASOSABS 0.05 05/19/2022    DIFFTYPE NOT REPORTED 05/21/2021     Allergen Panel:No results found for: REGVALL  Eosinophils/IgE:   Lab Results   Component Value Date/Time    EOSABS 0.21 05/19/2022 08:23 AM     Alpha 1 antitrypsin: No results found for: A1A  CMP:   Lab Results   Component Value Date     05/19/2022    K 4.0 05/19/2022     (H) 05/19/2022    CO2 22 05/19/2022    BUN 15 05/19/2022    CREATININE 0.93 05/19/2022    CALCIUM 9.4 05/19/2022    PROT 7.2 05/19/2022    LABALBU 4.4 05/19/2022    BILITOT 0.46 05/19/2022    BILIDIR 0.11 09/01/2021    IBILI 0.29 09/01/2021    ALT 19 05/19/2022    AST 18 05/19/2022    ALKPHOS 63 05/19/2022     Coagulation Profile:   No results found for: INR, PROTIME, APTT  BNP:   No results found for: BNP, PROBNP  D-Dimer/Fibrinogen:  No results found for: DDIMER  Others labs:  Lab Results   Component Value Date    TSH 1.56 05/19/2022     Lab Results   Component Value Date    MPO <0.3 06/18/2021    PR3 <0.7 06/18/2021    SEDRATE 25 (H) 06/18/2021    CRP 19.4 (H) 06/18/2021     Lab Results   Component Value Date     06/18/2021     No results found for: SPEP, UPEP, PSA, CEA, , EN9755,   No results found for: RPR, HIV    RADIOLOGY:  [] Ordered  [] Unavailable  [x] Reviewed    CT chest: 8/18/2022  Impression   No suspicious pulmonary nodule. LUNG RADS:   Per ACR Lung-RADS Version 1.1       Category 1, Negative (No nodules and definitely benign nodules). PET/CT: 8/4/2022  Impression   1. Solitary area of mild FDG uptake within an enlarged lymph node in the   anterior triangle of the right-side of the neck at the level of the   aryepiglottic folds approximately level 3 lymph node.        2.  No FDG avidity associated with a prominent pre carinal lymph node in the   chest. Wheezing 8.5 g 1    fluticasone-salmeterol (ADVAIR) 250-50 MCG/ACT AEPB diskus inhaler Inhale 1 puff into the lungs in the morning and 1 puff in the evening. 60 each 3    aspirin 81 MG EC tablet Take 81 mg by mouth daily      nicotine (NICODERM CQ) 14 MG/24HR  (Patient not taking: Reported on 10/12/2022)      albuterol (PROVENTIL) (2.5 MG/3ML) 0.083% nebulizer solution TAKE THREE MILLILITERS VIA NEBULIZATION EVERY 4 HOURS AS NEEDED FOR WHEEZING OR SHORTNESS OF BREATH 75 mL 1    albuterol (PROVENTIL) (5 MG/ML) 0.5% nebulizer solution Take 0.5 mLs by nebulization 4 times daily as needed for Wheezing 120 each 3     No facility-administered medications prior to visit. PRESCRIPTION DRUG MANAGEMENT/RECOMMENDATIONS:  PFT from May 2017 reviewed, ordered new PFT  Available imaging data reviewed including recent PET scan and CT scan findings  Continue Wixela and as needed albuterol  Reviewed use of rescue vs controlling agents and potential side effects. Reviewed use, techniques, schedule and side effects of all inhaled medications. Refills were provided as requested. Barriers to medication compliance addressed. Patient to have prednisone and an antibiotic available for use during an exacerbation. SUPPLEMENTAL OXYGEN/NIV RECOMMENDATIONS:  Patient is not on home oxygen therapy. SMOKING CESSATION RECOMMENDATIONS/COUNSELING:  Patient recommended smoking cessation    LIFESTYLE MODIFICATION RECOMMENDATIONS/COUNSELING:  Follow healthy behaviors: nutrition, exercise and medication adherence. Maintain an active lifestyle. LUNG CANCER SCREENING/OTHER IMAGING RECOMMENDATIONS:  After reviewing the patient's smoking history, age and other clinical criteria, the LOW DOSE CT is : NOT INDICATED;  Recent CT within 11 months     IMMUNIZATION HISTORY/RECOMMENDATIONS:  Immunization History   Administered Date(s) Administered    COVID-19, PFIZER PURPLE top, DILUTE for use, (age 15 y+), 30mcg/0.3mL 07/01/2021, 08/29/2021 Influenza, FLUBLOK, (age 25 y+), PF, 0.5mL 10/12/2019, 09/18/2022    Influenza, FLUCELVAX, (age 10 mo+), MDCK, PF, 0.5mL 09/29/2021    Pneumococcal Polysaccharide ([id number]) 05/20/2021    Tdap (Boostrix, Adacel) 01/01/2016    Zoster Recombinant (Shingrix) 04/11/2022, 06/21/2022     All the questions that the patient had were answered to his satisfaction  We'll see the patient back in 6 months  Thank you for having us involved in the care of your patient. Please call us if you have any questions or concerns. Jason Anaya MD  Pulmonary and Critical Care Medicine            Please note that this chart was generated using voice recognition Dragon dictation software. Although every effort was made to ensure the accuracy of this automated transcription, some errors in transcription may have occurred.

## 2022-10-13 ENCOUNTER — HOSPITAL ENCOUNTER (OUTPATIENT)
Dept: ULTRASOUND IMAGING | Age: 62
Discharge: HOME | End: 2022-10-15
Payer: MEDICARE

## 2022-10-13 DIAGNOSIS — N28.1 KIDNEY CYSTS: ICD-10-CM

## 2022-10-13 PROCEDURE — 76770 US EXAM ABDO BACK WALL COMP: CPT

## 2022-10-14 DIAGNOSIS — N40.0 PROSTATE ENLARGEMENT: Primary | ICD-10-CM

## 2022-10-25 RX ORDER — IBUPROFEN 600 MG/1
600 TABLET ORAL EVERY 8 HOURS PRN
Qty: 120 TABLET | Refills: 0 | Status: SHIPPED | OUTPATIENT
Start: 2022-10-25

## 2022-10-25 NOTE — TELEPHONE ENCOUNTER
Elba Champagne is calling to request a refill on the following medication(s):    Medication Request:  Requested Prescriptions     Pending Prescriptions Disp Refills    ibuprofen (ADVIL;MOTRIN) 600 MG tablet 120 tablet        Last Visit Date (If Applicable):  3/91/9659    Next Visit Date:    3/30/2023

## 2022-10-30 DIAGNOSIS — Z72.0 TOBACCO ABUSE: ICD-10-CM

## 2022-10-31 NOTE — TELEPHONE ENCOUNTER
Jolane Sacks is calling to request a refill on the following medication(s):    Medication Request:  Requested Prescriptions     Pending Prescriptions Disp Refills    NICOTINE STEP 1 21 MG/24HR [Pharmacy Med Name: NICOTINE 21 MG/24HR PATCH] 30 patch      Sig: APPLY ONE PATCH TO THE SKIN DAILY       Last Visit Date (If Applicable):  3/50/3994    Next Visit Date:    3/30/2023

## 2022-11-23 ENCOUNTER — HOSPITAL ENCOUNTER (OUTPATIENT)
Age: 62
Setting detail: SPECIMEN
Discharge: HOME | End: 2022-11-23

## 2022-11-23 LAB — PROSTATE SPECIFIC ANTIGEN: 3.43 NG/ML

## 2022-12-27 RX ORDER — IBUPROFEN 600 MG/1
TABLET ORAL
Qty: 90 TABLET | Refills: 0 | Status: SHIPPED | OUTPATIENT
Start: 2022-12-27

## 2022-12-27 NOTE — TELEPHONE ENCOUNTER
Requested Prescriptions     Pending Prescriptions Disp Refills    ibuprofen (ADVIL;MOTRIN) 600 MG tablet [Pharmacy Med Name: IBUPROFEN 600 MG TABLET] 90 tablet      Sig: TAKE ONE TABLET BY MOUTH EVERY 8 HOURS AS NEEDED FOR PAIN

## 2022-12-27 NOTE — TELEPHONE ENCOUNTER
Dr Grisel An filled 10/31/22 - then Rx was marked as not taking. Refill request came to you. Please sign.

## 2023-01-04 ENCOUNTER — TELEPHONE (OUTPATIENT)
Dept: GASTROENTEROLOGY | Age: 63
End: 2023-01-04

## 2023-01-19 RX ORDER — TADALAFIL 20 MG/1
TABLET ORAL
Qty: 15 TABLET | OUTPATIENT
Start: 2023-01-19

## 2023-02-16 ENCOUNTER — HOSPITAL ENCOUNTER (EMERGENCY)
Age: 63
Discharge: HOME | End: 2023-02-16
Attending: EMERGENCY MEDICINE
Payer: MEDICAID

## 2023-02-16 VITALS
HEART RATE: 85 BPM | SYSTOLIC BLOOD PRESSURE: 141 MMHG | OXYGEN SATURATION: 96 % | RESPIRATION RATE: 18 BRPM | DIASTOLIC BLOOD PRESSURE: 83 MMHG | TEMPERATURE: 98 F

## 2023-02-16 DIAGNOSIS — M62.838 TRAPEZIUS MUSCLE SPASM: ICD-10-CM

## 2023-02-16 DIAGNOSIS — M62.838 NECK MUSCLE SPASM: Primary | ICD-10-CM

## 2023-02-16 PROCEDURE — 6370000000 HC RX 637 (ALT 250 FOR IP): Performed by: EMERGENCY MEDICINE

## 2023-02-16 PROCEDURE — 96372 THER/PROPH/DIAG INJ SC/IM: CPT

## 2023-02-16 PROCEDURE — 99284 EMERGENCY DEPT VISIT MOD MDM: CPT

## 2023-02-16 PROCEDURE — 6360000002 HC RX W HCPCS: Performed by: EMERGENCY MEDICINE

## 2023-02-16 RX ORDER — CYCLOBENZAPRINE HCL 10 MG
10 TABLET ORAL 3 TIMES DAILY PRN
Qty: 15 TABLET | Refills: 0 | Status: SHIPPED | OUTPATIENT
Start: 2023-02-16 | End: 2023-02-20 | Stop reason: SDUPTHER

## 2023-02-16 RX ORDER — LIDOCAINE 4 G/G
1 PATCH TOPICAL ONCE
Status: DISCONTINUED | OUTPATIENT
Start: 2023-02-16 | End: 2023-02-16 | Stop reason: HOSPADM

## 2023-02-16 RX ORDER — ORPHENADRINE CITRATE 30 MG/ML
60 INJECTION INTRAMUSCULAR; INTRAVENOUS ONCE
Status: COMPLETED | OUTPATIENT
Start: 2023-02-16 | End: 2023-02-16

## 2023-02-16 RX ADMIN — ORPHENADRINE CITRATE 60 MG: 30 INJECTION INTRAMUSCULAR; INTRAVENOUS at 17:00

## 2023-02-16 ASSESSMENT — ENCOUNTER SYMPTOMS
ABDOMINAL PAIN: 0
DIARRHEA: 0
BACK PAIN: 0
NAUSEA: 0
WHEEZING: 0
SORE THROAT: 0
COUGH: 0
VOMITING: 0
SHORTNESS OF BREATH: 0

## 2023-02-16 ASSESSMENT — PAIN SCALES - GENERAL
PAINLEVEL_OUTOF10: 8
PAINLEVEL_OUTOF10: 8

## 2023-02-16 ASSESSMENT — PAIN - FUNCTIONAL ASSESSMENT: PAIN_FUNCTIONAL_ASSESSMENT: 0-10

## 2023-02-16 NOTE — DISCHARGE INSTRUCTIONS
Return to the emergency department symptoms worsen or persist.  Follow-up with your family doctor in 1 to 2 days. Physical therapy and ice would be of benefit. Remove the patch applied in the emergency department in 12 hours and reapply with an over-the-counter Lidoderm patch as necessary. Take the prescribed Flexeril muscle relaxant as written today.

## 2023-02-16 NOTE — ED PROVIDER NOTES
[address] [facility] Emergency Department  [address] [address] [facility] [address]  Phone: [phone]  Fax: [phone]    eMERGENCY dEPARTMENT eNCOUnter        Pt Name: Fran Hampton  MRN: [id number]  [place] 1960  Date of evaluation: 2/16/23    CHIEF COMPLAINT     Chief Complaint   Patient presents with    Neck Pain     Laying on stomach on mon or tues night looking up to watch tv screen -non known injury  Right side pain that shoots down back a little and right arm wont lift above shoulder         HISTORY OF PRESENT ILLNESS    Fran Hampton is a 63 y.o. male who presents to the emergency department with a right neck posterior shoulder pain. Patient stated that Monday evening it began while he was lying in an awkward position with his neck up trying to watch a movie. He woke up the next morning the pain was so intense. He stated he was supposed to go to a dentist appointment yesterday and decided it was difficult even turn his head and he felt uncomfortable with even driving because it required him to move his head. He did admit with some massage and ibuprofen that it helped a little. He is now able to move his neck and feel comfortable with driving. He denies any midline vertebral tenderness. No weakness or numbness. The pain is worse when he lifts his right arm above his head. He is able to reproduce the exact same pain feels crepitus of the muscle of this area of the trapezius and subscapularis areas. Patient denies any fever chills cough congestion chest pain or shortness of breath. No abdominal pain nausea or vomiting. No weakness or numbness. No recent injury or trauma. REVIEW OF SYSTEMS     Review of Systems   Constitutional:  Negative for chills and fever. HENT:  Negative for congestion, ear pain and sore throat. Respiratory:  Negative for cough, shortness of breath and wheezing. Cardiovascular:  Negative for chest pain, palpitations and leg swelling. Gastrointestinal:  Negative for abdominal pain, diarrhea, nausea and vomiting. Genitourinary:  Negative for dysuria, flank pain, frequency and hematuria. Musculoskeletal:  Negative for back pain. Skin:  Negative for rash. Neurological:  Negative for dizziness, light-headedness, numbness and headaches. PAST MEDICAL HISTORY    has a past medical history of COPD (chronic obstructive pulmonary disease) ([facility] [address] Gingival and periodontal disease, Hyperlipidemia, Primary osteoarthritis of both hips, Primary osteoarthritis of left hip, and Seasonal allergies. SURGICAL HISTORY      has a past surgical history that includes Colonoscopy; Inguinal hernia repair (Right, 09/30/2022); and hernia repair (Right, 9/30/2022).     CURRENT MEDICATIONS       Discharge Medication List as of 2/16/2023  5:08 PM        CONTINUE these medications which have NOT CHANGED    Details   ibuprofen (ADVIL;MOTRIN) 600 MG tablet TAKE ONE TABLET BY MOUTH EVERY 8 HOURS AS NEEDED FOR PAIN, Disp-90 tablet, R-0Normal      NICOTINE STEP 2 14 MG/24HR APPLY ONE PATCH TO THE SKIN DAILY FOR 14 DAYS, Disp-28 patch, R-2Normal      ipratropium-albuterol (DUONEB) 0.5-2.5 (3) MG/3ML SOLN nebulizer solution Inhale 3 mLs into the lungs every 4 hours, Disp-360 mL, R-11Normal      vitamin D (ERGOCALCIFEROL) 1.25 MG (91927 UT) CAPS capsule TAKE ONE CAPSULE BY MOUTH ONCE WEEKLY, Disp-4 capsule, R-2Normal      docusate sodium (COLACE) 100 MG capsule Take 1 capsule by mouth 2 times daily, Disp-20 capsule, R-0Normal      atorvastatin (LIPITOR) 20 MG tablet TAKE ONE TABLET BY MOUTH DAILY, Disp-30 tablet, R-5Normal      albuterol sulfate HFA (PROVENTIL;VENTOLIN;PROAIR) 108 (90 Base) MCG/ACT inhaler Inhale 2 puffs into the lungs every 4 hours as needed for Wheezing, Disp-8.5 g, R-1Normal      fluticasone-salmeterol (ADVAIR) 250-50 MCG/ACT AEPB diskus inhaler Inhale 1 puff into the lungs in the morning and 1 puff in the evening., Disp-60 each, R-3Normal aspirin 81 MG EC tablet Take 81 mg by mouth dailyHistorical Med             ALLERGIES     has No Known Allergies. FAMILY HISTORY     He indicated that his mother is . He indicated that his father is . He indicated that his sister is . He indicated that his brother is . He indicated that his maternal grandmother is . He indicated that his maternal grandfather is . He indicated that his paternal grandmother is . He indicated that his paternal grandfather is . He indicated that the status of his neg hx is unknown.     family history includes Coronary Art Dis in his mother; Dementia in his mother; Diabetes in his brother; Heart Attack in his father; Other in his mother. SOCIAL HISTORY      reports that he has been smoking cigarettes. He started smoking about 47 years ago. He has a 44.00 pack-year smoking history. He has never used smokeless tobacco. He reports current alcohol use. He reports current drug use. Drug: Marijuana [place]). PHYSICAL EXAM     INITIAL VITALS:  temperature is 98 °F (36.7 °C). His blood pressure is 141/83 (abnormal) and his pulse is 85. His respiration is 18 and oxygen saturation is 96%. Physical Exam  Vitals and nursing note reviewed. Constitutional:       Appearance: Normal appearance. HENT:      Head: Normocephalic and atraumatic. Nose: Nose normal.      Mouth/Throat:      Mouth: Mucous membranes are moist.   Eyes:      Extraocular Movements: Extraocular movements intact. Pupils: Pupils are equal, round, and reactive to light. Cardiovascular:      Rate and Rhythm: Normal rate and regular rhythm. Pulses: Normal pulses. Heart sounds: Normal heart sounds. Pulmonary:      Effort: Pulmonary effort is normal.      Breath sounds: Normal breath sounds. Abdominal:      General: Abdomen is flat. Bowel sounds are normal.      Palpations: Abdomen is soft.       Comments: No pulsatile mass Musculoskeletal:         General: Normal range of motion. Cervical back: Normal range of motion and neck supple. Comments: Able to reproduce to the exact same pain that brought the patient to the emergency department. Able to palpate these small muscle crepitus type spasms within the musculature of the right trapezius and subscapularis on the right side. Skin:     General: Skin is warm and dry. Neurological:      General: No focal deficit present. Mental Status: He is alert and oriented to person, place, and time. Mental status is at baseline. Psychiatric:         Mood and Affect: Mood normal.         Behavior: Behavior normal.     DIFFERENTIAL DIAGNOSIS / MDM / EMERGENCY DEPARTMENT COURSE:   Differential diagnosis considered: see below    Chronic Conditions affecting care (DM,HTN,CA, etc): none    Social Determinants of Health affecting care (unable to care for self, lives alone, unemployed, homeless,etc): cares for self    History source(s) (patient,spouse,parent,family,friend,EMS,etc): patient    Review of external sources (ECF,Hospital records,EMS report, radiology reports, etc): none    Tests considered but not ordered: Not applicable    Independent interpretation of tests (eg.  X-ray, CAT scan, Doppler studies, EKG): none    Discussion of x-ray results with radiology: none    Consults: none    Consideration for admission/observation (even if discharged): not necessary    Prescription considerations: flexeril    Sepsis considered: na    Critical Care note written: na   The patient was personally seen and evaluated by myself. Patient is otherwise a healthy individual who is currently still smoking at least 3 to 5 cigarettes a day with COPD history that presents with a muscle discomfort. It is classic of a muscle trapezius and subscapularis muscle strain.   We will give him a shot of Norflex and 5 days of Flexeril to take at home as well as place a Lidoderm patch here in the department for comfort. Patient feels comfortable with going home without any imaging or testing here in the department. It appears the plan is simply being a muscle strain. He is going to enroll himself in physical therapy. DIAGNOSTIC RESULTS     LABS:  No results found for this visit on 02/16/23. All other labs were within normal range or not returned as of this dictation. RADIOLOGY:  No orders to display       I have reviewed the disposition diagnosis with the patient and or their family/guardian. I have answered their questions and givendischarge instructions. They voiced understanding of these instructions and did not have any further questions or complaints. PROCEDURES:  Unless otherwise noted below, none     Procedures    FINAL IMPRESSION      1. Neck muscle spasm    2.  Trapezius muscle spasm          DISPOSITION/PLAN   DISPOSITION Decision To Discharge 02/16/2023 04:53:10 PM      PATIENT REFERRED TO:  Shivani Luz MD  [address] [phone]    Call today      DISCHARGE MEDICATIONS:  Discharge Medication List as of 2/16/2023  5:08 PM        START taking these medications    Details   cyclobenzaprine (FLEXERIL) 10 MG tablet Take 1 tablet by mouth 3 times daily as needed for Muscle spasms, Disp-15 tablet, R-0Print                (Please note that portions of this note were completed with a voice recognition program.  Efforts were made to edit the dictations but occasionally words are mis-transcribed.)    Olvin Nunez DO,(electronically signed)  Board Certified Emergency Physician       Olvin Nunez DO  02/17/23 [id number]

## 2023-02-20 ENCOUNTER — TELEPHONE (OUTPATIENT)
Dept: FAMILY MEDICINE CLINIC | Age: 63
End: 2023-02-20

## 2023-02-20 ENCOUNTER — HOSPITAL ENCOUNTER (EMERGENCY)
Age: 63
Discharge: HOME | End: 2023-02-20
Attending: EMERGENCY MEDICINE
Payer: MEDICAID

## 2023-02-20 VITALS
BODY MASS INDEX: 25.05 KG/M2 | TEMPERATURE: 98.6 F | OXYGEN SATURATION: 96 % | WEIGHT: 175 LBS | RESPIRATION RATE: 18 BRPM | HEART RATE: 100 BPM | DIASTOLIC BLOOD PRESSURE: 94 MMHG | SYSTOLIC BLOOD PRESSURE: 132 MMHG | HEIGHT: 70 IN

## 2023-02-20 DIAGNOSIS — M54.12 CERVICAL RADICULOPATHY: Primary | ICD-10-CM

## 2023-02-20 PROCEDURE — 99283 EMERGENCY DEPT VISIT LOW MDM: CPT

## 2023-02-20 RX ORDER — PREDNISONE 20 MG/1
TABLET ORAL
Qty: 18 TABLET | Refills: 0 | Status: SHIPPED | OUTPATIENT
Start: 2023-02-20

## 2023-02-20 RX ORDER — CYCLOBENZAPRINE HCL 10 MG
10 TABLET ORAL 2 TIMES DAILY PRN
Qty: 20 TABLET | Refills: 0 | Status: SHIPPED | OUTPATIENT
Start: 2023-02-20 | End: 2023-03-02

## 2023-02-20 ASSESSMENT — PAIN SCALES - GENERAL: PAINLEVEL_OUTOF10: 10

## 2023-02-20 ASSESSMENT — PAIN DESCRIPTION - LOCATION: LOCATION: NECK

## 2023-02-20 ASSESSMENT — PAIN DESCRIPTION - PAIN TYPE: TYPE: CHRONIC PAIN

## 2023-02-20 ASSESSMENT — PAIN - FUNCTIONAL ASSESSMENT: PAIN_FUNCTIONAL_ASSESSMENT: 0-10

## 2023-02-20 NOTE — ED PROVIDER NOTES
[address]      Pt Name: Charissa Gaffney  MRN: [id number]  [place] 1960  Date of evaluation: 2/20/2023  Provider: Esperanza Del Castillo MD    CHIEF COMPLAINT     Chief Complaint   Patient presents with    Neck Pain         HISTORY OF PRESENT ILLNESS   (Location/Symptom, Timing/Onset, Context/Setting,Quality, Duration, Modifying Factors, Severity)  Note limiting factors. Charissa Gaffney is a 63 y.o. male who presents to the emergency department stating about a week ago he spent several hours lying in the prone position when he got up he noticed pain in the right side of his neck radiating to the right shoulder. Patient was seen in this ED few days ago and was placed on Flexeril and Lidoderm patch. Pain is not completely relieved. He does not report any weakness of the right arm. The history is provided by the patient. Nursing Notes werereviewed. REVIEW OF SYSTEMS    (2-9 systems for level 4, 10 or more for level 5)     Review of Systems   All other systems reviewed and are negative. Except as noted above the remainder of the review of systems was reviewed and negative.        PAST MEDICAL HISTORY     Past Medical History:   Diagnosis Date    COPD (chronic obstructive pulmonary disease) ([facility] [address]     emphysema    Gingival and periodontal disease     chronic    Hyperlipidemia     Primary osteoarthritis of both hips     Primary osteoarthritis of left hip 06/22/2017    Seasonal allergies          SURGICALHISTORY       Past Surgical History:   Procedure Laterality Date    COLONOSCOPY      HERNIA REPAIR Right 9/30/2022    RIGHT INGUINAL  HERNIA REPAIR LAPAROSCOPIC ROBOTIC WITH MESH performed by Bonnie Granda DO at [address] [place] [address] Right 09/30/2022    RIGHT INGUINAL  HERNIA REPAIR LAPAROSCOPIC ROBOTIC WITH MESH         CURRENT MEDICATIONS       Previous Medications    ALBUTEROL SULFATE HFA (PROVENTIL;VENTOLIN;PROAIR) 108 (90 BASE) MCG/ACT INHALER    Inhale 2 puffs into the lungs every 4 hours as needed for Wheezing    ASPIRIN 81 MG EC TABLET    Take 81 mg by mouth daily    ATORVASTATIN (LIPITOR) 20 MG TABLET    TAKE ONE TABLET BY MOUTH DAILY    DOCUSATE SODIUM (COLACE) 100 MG CAPSULE    Take 1 capsule by mouth 2 times daily    FLUTICASONE-SALMETEROL (ADVAIR) 250-50 MCG/ACT AEPB DISKUS INHALER    Inhale 1 puff into the lungs in the morning and 1 puff in the evening. IBUPROFEN (ADVIL;MOTRIN) 600 MG TABLET    TAKE ONE TABLET BY MOUTH EVERY 8 HOURS AS NEEDED FOR PAIN    IPRATROPIUM-ALBUTEROL (DUONEB) 0.5-2.5 (3) MG/3ML SOLN NEBULIZER SOLUTION    Inhale 3 mLs into the lungs every 4 hours    NICOTINE STEP 2 14 MG/24HR    APPLY ONE PATCH TO THE SKIN DAILY FOR 14 DAYS    VITAMIN D (ERGOCALCIFEROL) 1.25 MG (31480 UT) CAPS CAPSULE    TAKE ONE CAPSULE BY MOUTH ONCE WEEKLY       ALLERGIES     Patient has no known allergies.     FAMILY HISTORY       Family History   Problem Relation Age of Onset    Coronary Art Dis Mother     Other Mother         ulcers    Dementia Mother     Heart Attack Father     Diabetes Brother     Colon Cancer Neg Hx     Breast Cancer Neg Hx     Prostate Cancer Neg Hx           SOCIAL HISTORY       Social History     Socioeconomic History    Marital status: Single   Tobacco Use    Smoking status: Every Day     Packs/day: 1.00     Years: 44.00     Pack years: 44.00     Types: Cigarettes     Start date: 1/1/1976    Smokeless tobacco: Never    Tobacco comments:     10/12/22: pt has cut down to 3-4 cigarettes daily   Vaping Use    Vaping Use: Never used   Substance and Sexual Activity    Alcohol use: Yes     Comment: occassionly     Drug use: Yes     Types: Marijuana (Weed)     Comment: rare social use only    Sexual activity: Not Currently     Partners: Female     Comment: had 80 female partners lifetime--has not been sexually active since 2017     Social Determinants of Health     Financial Resource Strain: Low Risk Difficulty of Paying Living Expenses: Not hard at all   Food Insecurity: No Food Insecurity    Worried About [facility] in the Last Year: Never true    [address] in the Last Year: Never true       SCREENINGS             PHYSICAL EXAM    (up to 7 for level 4, 8 or more for level 5)     ED Triage Vitals [02/20/23 1640]   BP Temp Temp Source Heart Rate Resp SpO2 Height Weight   (!) 132/94 98.6 °F (37 °C) Oral 100 18 96 % 5' 10\" (1.778 m) 175 lb (79.4 kg)       Physical Exam  Vitals reviewed. Constitutional:       General: He is not in acute distress. Appearance: He is not ill-appearing. HENT:      Head:      Comments: HEENT exam is normal to inspection  Neck:      Comments: Right paracervical tenderness. Patient unable to cooperate with performing the Spurling test.  Cardiovascular:      Rate and Rhythm: Normal rate and regular rhythm. Heart sounds: Normal heart sounds. Pulmonary:      Effort: Pulmonary effort is normal. No respiratory distress. Breath sounds: Normal breath sounds. Abdominal:      Palpations: Abdomen is soft. Tenderness: There is no abdominal tenderness. Musculoskeletal:      Cervical back: Neck supple. Comments: Tenderness localizes over the superior border of the right trapezius and medial to the right scapula consistent with cervical radicular pain. There is no midline cervical or thoracic spine tenderness. Skin:     General: Skin is warm and dry. Coloration: Skin is not pale. Neurological:      Mental Status: He is alert.        DIAGNOSTIC RESULTS     EKG: All EKG's are interpreted by the Emergency Department Physician who either signs orCo-signs this chart in the absence of a cardiologist.    RADIOLOGY:     Interpretation per the Radiologist below, ifavailable at the time of this note:    No orders to display         ED BEDSIDE ULTRASOUND:   Performed by ED Physician - none    LABS:  Labs Reviewed - No data to display    All other labs were within normal range ornot returned as of this dictation. EMERGENCY DEPARTMENT COURSE and DIFFERENTIAL DIAGNOSIS/MDM:   Vitals:    Vitals:    02/20/23 1640   BP: (!) 132/94   Pulse: 100   Resp: 18   Temp: 98.6 °F (37 °C)   TempSrc: Oral   SpO2: 96%   Weight: 79.4 kg (175 lb)   Height: 5' 10\" (1.778 m)            Patient is placed on a tapering dose of prednisone and additional prescription for more Flexeril since he is running out of it and is referred to his PCP for further management since he might require physical therapy. CONSULTS:  None    PROCEDURES:  Unlessotherwise noted below, none     Procedures    FINAL IMPRESSION      1. Cervical radiculopathy          DISPOSITION/PLAN   DISPOSITION Decision To Discharge 02/20/2023 05:06:42 PM      PATIENT REFERRED TO:  Joce Paredes MD  [address]  [address] [facility] [address]  [phone]          DISCHARGE MEDICATIONS:         Problem List:  Patient Active Problem List   Diagnosis Code    Primary osteoarthritis of left hip M16.12    Prediabetes R73.03    Tobacco abuse Z72.0    Chronic hepatitis C without hepatic coma ([facility] [address] B18.2    Pure hypercholesterolemia E78.00    Vitamin D deficiency E55.9    Other emphysema ([facility] [address] J43.8    Trapezius muscle spasm M62.838           Summation      Patient Course: Discharged    ED Medicationsadministered this visit:  Medications - No data to display    New Prescriptions from this visit:    New Prescriptions    PREDNISONE (DELTASONE) 20 MG TABLET    PO 3 tablets daily for 3 days, then 2 tablets daily for 3 days, then 1 tablet daily for 3 days. Follow-up:  Joce Paredes MD  [address]  [address] [facility] [place] [address]  [phone]            Final Impression:   1.  Cervical radiculopathy               (Please note that portions of this note were completed with a voice recognitionprogram.  Efforts were made to edit the dictations but occasionally words are mis-transcribed.)    Neeru Langford MD (electronically signed)  Attending Emergency Physician            Jacoby Brown MD  02/20/23 6646

## 2023-02-20 NOTE — TELEPHONE ENCOUNTER
Patient girlfriend called requesting a appointment today at 3:46pm for a steroid shots due to the patient having a messed up rotator cuff. GF was informed that patient will need to come in to be seen with the provider. She demand he be seen today.  GF was informed that the providers are done with in person visit for the day but if its an emergency he should report back to the ED

## 2023-02-23 ENCOUNTER — TELEPHONE (OUTPATIENT)
Dept: FAMILY MEDICINE CLINIC | Age: 63
End: 2023-02-23

## 2023-02-23 NOTE — TELEPHONE ENCOUNTER
[facility]) ED Follow up Call    Reason for ED visit: neck pain   No answer        Tomas Burris , this is Estrellita Moore from Dr. Gladis Norwood's office, just calling to see how you are doing after your recent ED visit. Did you receive discharge instructions? Do you understand the discharge instructions? Did the ED give you any new prescriptions? Were you able to fill your prescriptions? Do you have one of our red, yellow and green  Zone sheets that help you to determine when you should go to the ED? Do you need or want to make a follow up appt with your PCP? Do you have any further needs in the home i.e. Equipment? FU appts/Provider:    Future Appointments   Date Time Provider Modesto Sanchez   3/30/2023 11:00 AM [facility]   3/30/2023  1:15 PM MD Pool Reddy [facility]   4/17/2023  2:15 PM MD Camilo Lugo [facility]       VOICEMAIL DOCUMENTATION - ERASE IF NOT USED  Hi, this message is for    This is  from  [facility] office. Just calling to see how you are doing after your recent visit to the Emergency Room. Tila Cadena wants to make sure you were able to fill any prescriptions and that you understand your discharge instructions. Please return our call if you need to make a follow up appointment with your provider or have any further needs. Our phone number is  Have a great day.

## 2023-02-27 ENCOUNTER — TELEPHONE (OUTPATIENT)
Dept: FAMILY MEDICINE CLINIC | Age: 63
End: 2023-02-27

## 2023-02-27 DIAGNOSIS — M54.12 CERVICAL RADICULOPATHY: Primary | ICD-10-CM

## 2023-02-27 RX ORDER — IBUPROFEN 800 MG/1
800 TABLET ORAL 2 TIMES DAILY PRN
Qty: 60 TABLET | Refills: 5 | Status: SHIPPED | OUTPATIENT
Start: 2023-02-27

## 2023-02-27 RX ORDER — IBUPROFEN 600 MG/1
TABLET ORAL
Qty: 90 TABLET | Refills: 0 | Status: CANCELLED | OUTPATIENT
Start: 2023-02-27

## 2023-02-27 NOTE — TELEPHONE ENCOUNTER
Patient would like to know if the ibuprofen could be up to 800mg, because the 600mg isn't helping the pain

## 2023-03-01 ENCOUNTER — OFFICE VISIT (OUTPATIENT)
Dept: FAMILY MEDICINE CLINIC | Age: 63
End: 2023-03-01

## 2023-03-01 VITALS
BODY MASS INDEX: 25.77 KG/M2 | WEIGHT: 180 LBS | DIASTOLIC BLOOD PRESSURE: 82 MMHG | HEIGHT: 70 IN | TEMPERATURE: 97.3 F | SYSTOLIC BLOOD PRESSURE: 134 MMHG | HEART RATE: 102 BPM | OXYGEN SATURATION: 96 %

## 2023-03-01 DIAGNOSIS — M54.12 CERVICAL RADICULOPATHY: Primary | ICD-10-CM

## 2023-03-01 DIAGNOSIS — S46.911D RIGHT SHOULDER STRAIN, SUBSEQUENT ENCOUNTER: ICD-10-CM

## 2023-03-01 DIAGNOSIS — N40.0 BENIGN PROSTATIC HYPERPLASIA, UNSPECIFIED WHETHER LOWER URINARY TRACT SYMPTOMS PRESENT: ICD-10-CM

## 2023-03-01 DIAGNOSIS — E78.00 PURE HYPERCHOLESTEROLEMIA: ICD-10-CM

## 2023-03-01 DIAGNOSIS — N52.9 ERECTILE DYSFUNCTION, UNSPECIFIED ERECTILE DYSFUNCTION TYPE: ICD-10-CM

## 2023-03-01 DIAGNOSIS — Z72.0 TOBACCO ABUSE: ICD-10-CM

## 2023-03-01 DIAGNOSIS — R73.03 PREDIABETES: ICD-10-CM

## 2023-03-01 RX ORDER — TADALAFIL 10 MG/1
10 TABLET ORAL DAILY PRN
Qty: 10 TABLET | Refills: 0 | Status: SHIPPED | OUTPATIENT
Start: 2023-03-01

## 2023-03-01 RX ORDER — TAMSULOSIN HYDROCHLORIDE 0.4 MG/1
CAPSULE ORAL
COMMUNITY
Start: 2023-02-03 | End: 2023-03-01 | Stop reason: SDUPTHER

## 2023-03-01 RX ORDER — TAMSULOSIN HYDROCHLORIDE 0.4 MG/1
0.4 CAPSULE ORAL DAILY
Qty: 30 CAPSULE | Refills: 2 | Status: SHIPPED | OUTPATIENT
Start: 2023-03-01

## 2023-03-01 RX ORDER — NICOTINE 21 MG/24HR
PATCH, TRANSDERMAL 24 HOURS TRANSDERMAL
Qty: 28 PATCH | Refills: 2 | Status: SHIPPED | OUTPATIENT
Start: 2023-03-01

## 2023-03-01 RX ORDER — TRAMADOL HYDROCHLORIDE 50 MG/1
50 TABLET ORAL EVERY 4 HOURS PRN
Qty: 18 TABLET | Refills: 0 | Status: SHIPPED | OUTPATIENT
Start: 2023-03-01 | End: 2023-03-04

## 2023-03-01 SDOH — ECONOMIC STABILITY: FOOD INSECURITY: WITHIN THE PAST 12 MONTHS, THE FOOD YOU BOUGHT JUST DIDN'T LAST AND YOU DIDN'T HAVE MONEY TO GET MORE.: NEVER TRUE

## 2023-03-01 SDOH — ECONOMIC STABILITY: HOUSING INSECURITY
IN THE LAST 12 MONTHS, WAS THERE A TIME WHEN YOU DID NOT HAVE A STEADY PLACE TO SLEEP OR SLEPT IN A SHELTER (INCLUDING NOW)?: NO

## 2023-03-01 SDOH — ECONOMIC STABILITY: FOOD INSECURITY: WITHIN THE PAST 12 MONTHS, YOU WORRIED THAT YOUR FOOD WOULD RUN OUT BEFORE YOU GOT MONEY TO BUY MORE.: NEVER TRUE

## 2023-03-01 SDOH — ECONOMIC STABILITY: INCOME INSECURITY: HOW HARD IS IT FOR YOU TO PAY FOR THE VERY BASICS LIKE FOOD, HOUSING, MEDICAL CARE, AND HEATING?: NOT HARD AT ALL

## 2023-03-01 ASSESSMENT — PATIENT HEALTH QUESTIONNAIRE - PHQ9
SUM OF ALL RESPONSES TO PHQ QUESTIONS 1-9: 0
1. LITTLE INTEREST OR PLEASURE IN DOING THINGS: 0
SUM OF ALL RESPONSES TO PHQ QUESTIONS 1-9: 0
SUM OF ALL RESPONSES TO PHQ9 QUESTIONS 1 & 2: 0
SUM OF ALL RESPONSES TO PHQ QUESTIONS 1-9: 0
2. FEELING DOWN, DEPRESSED OR HOPELESS: 0
SUM OF ALL RESPONSES TO PHQ QUESTIONS 1-9: 0

## 2023-03-01 NOTE — PROGRESS NOTES
[address] PRIMARY CARE  [address] [place] [address] [facility]  Dept: [phone]  Dept Fax: [phone]    Basilio Staples is a 63 y.o. male who is a Established patient, who presents today for his medical conditions/complaints as noted below:  Chief Complaint   Patient presents with    Shoulder Pain     Right shoulder starting to feel a little better          HPI:     He is here today to follow-up on his recent ER visit for right shoulder pain. He states that he was having severe shoulder pain after having strained it doing some physical work. He went to ER and was given some steroids after which he states that his pain has improved significantly. He states that he has been able to move around his arm normally now. He states that occasionally the pain coming from his neck due to cervical radiculopathy flares up and he would like to follow-up with physical therapy for long-term relief. He is also trying to cut down on his smoking and states that the patches have been useful. He is due for his repeat labs.       Hemoglobin A1C (%)   Date Value   05/19/2022 6.2 (H)   05/26/2021 6.3 (H)             ( goal A1Cis < 7)   No results found for: LABMICR  LDL Cholesterol (mg/dL)   Date Value   05/19/2022 81   06/18/2021 90   05/21/2021 148 (H)       (goal LDL is <100)   AST (U/L)   Date Value   05/19/2022 18     ALT (U/L)   Date Value   05/19/2022 19     BUN (mg/dL)   Date Value   05/19/2022 15     BP Readings from Last 3 Encounters:   03/01/23 134/82   02/20/23 (!) 132/94   02/16/23 (!) 141/83          (goal 120/80)    Past Medical History:   Diagnosis Date    COPD (chronic obstructive pulmonary disease) (HCC)     emphysema    Gingival and periodontal disease     chronic    Hyperlipidemia     Primary osteoarthritis of both hips     Primary osteoarthritis of left hip 06/22/2017    Seasonal allergies       Past Surgical History:   Procedure Laterality Date    COLONOSCOPY      HERNIA REPAIR Right 9/30/2022    RIGHT INGUINAL  HERNIA REPAIR LAPAROSCOPIC ROBOTIC WITH MESH performed by Eduar Rodriguez DO at [address] [facility] [address] Right 09/30/2022    RIGHT INGUINAL  HERNIA REPAIR LAPAROSCOPIC ROBOTIC WITH MESH       Family History   Problem Relation Age of Onset    Coronary Art Dis Mother     Other Mother         ulcers    Dementia Mother     Heart Attack Father     Diabetes Brother     Colon Cancer Neg Hx     Breast Cancer Neg Hx     Prostate Cancer Neg Hx        Social History     Tobacco Use    Smoking status: Every Day     Packs/day: 1.00     Years: 44.00     Pack years: 44.00     Types: Cigarettes     Start date: 1/1/1976    Smokeless tobacco: Never    Tobacco comments:     10/12/22: pt has cut down to 3-4 cigarettes daily   Substance Use Topics    Alcohol use: Yes     Comment: occassionly       Current Outpatient Medications   Medication Sig Dispense Refill    traMADol (ULTRAM) 50 MG tablet Take 1 tablet by mouth every 4 hours as needed for Pain for up to 3 days. Intended supply: 3 days.  Take lowest dose possible to manage pain Max Daily Amount: 300 mg 18 tablet 0    tamsulosin (FLOMAX) 0.4 MG capsule Take 1 capsule by mouth daily 30 capsule 2    tadalafil (CIALIS) 10 MG tablet Take 1 tablet by mouth daily as needed for Erectile Dysfunction 10 tablet 0    nicotine (NICOTINE STEP 2) 14 MG/24HR APPLY ONE PATCH TO THE SKIN DAILY FOR 14 DAYS 28 patch 2    ibuprofen (ADVIL;MOTRIN) 800 MG tablet Take 1 tablet by mouth 2 times daily as needed for Pain 60 tablet 5    cyclobenzaprine (FLEXERIL) 10 MG tablet Take 1 tablet by mouth 2 times daily as needed for Muscle spasms 20 tablet 0    ipratropium-albuterol (DUONEB) 0.5-2.5 (3) MG/3ML SOLN nebulizer solution Inhale 3 mLs into the lungs every 4 hours 360 mL 11    vitamin D (ERGOCALCIFEROL) 1.25 MG (41115 UT) CAPS capsule TAKE ONE CAPSULE BY MOUTH ONCE WEEKLY 4 capsule 2    docusate sodium (COLACE) 100 MG capsule Take 1 capsule by mouth 2 times daily 20 capsule 0    atorvastatin (LIPITOR) 20 MG tablet TAKE ONE TABLET BY MOUTH DAILY 30 tablet 5    albuterol sulfate HFA (PROVENTIL;VENTOLIN;PROAIR) 108 (90 Base) MCG/ACT inhaler Inhale 2 puffs into the lungs every 4 hours as needed for Wheezing 8.5 g 1    fluticasone-salmeterol (ADVAIR) 250-50 MCG/ACT AEPB diskus inhaler Inhale 1 puff into the lungs in the morning and 1 puff in the evening. 60 each 3    aspirin 81 MG EC tablet Take 81 mg by mouth daily      predniSONE (DELTASONE) 20 MG tablet PO 3 tablets daily for 3 days, then 2 tablets daily for 3 days, then 1 tablet daily for 3 days. (Patient not taking: Reported on 3/1/2023) 18 tablet 0     No current facility-administered medications for this visit. No Known Allergies    Health Maintenance   Topic Date Due    Colorectal Cancer Screen  Never done    COVID-19 Vaccine (3 - Booster for Pfizer series) 10/24/2021    Hepatitis A vaccine (1 of 2 - Risk 2-dose series) 03/29/2023 (Originally 6/9/1961)    Hepatitis B vaccine (1 of 3 - Risk 3-dose series) 03/29/2023 (Originally 6/9/1979)    A1C test (Diabetic or Prediabetic)  05/19/2023    Lipids  05/19/2023    Low dose CT lung screening  08/18/2023    Depression Screen  03/01/2024    DTaP/Tdap/Td vaccine (2 - Td or Tdap) 01/01/2026    Flu vaccine  Completed    Shingles vaccine  Completed    Pneumococcal 0-64 years Vaccine  Completed    HIV screen  Completed    Hib vaccine  Aged Out    Meningococcal (ACWY) vaccine  Aged Out       Subjective:     Review of Systems   Constitutional:  Negative for appetite change, fatigue and fever. HENT:  Negative for congestion, ear pain, hearing loss and sore throat. Eyes:  Negative for discharge and visual disturbance. Respiratory:  Negative for cough, chest tightness, shortness of breath and wheezing. Cardiovascular:  Negative for chest pain, palpitations and leg swelling.    Gastrointestinal:  Negative for abdominal pain, constipation, diarrhea, nausea and vomiting. Genitourinary:  Negative for flank pain, frequency, hematuria and urgency. Musculoskeletal:  Positive for arthralgias and neck pain. Negative for gait problem, joint swelling and myalgias. Skin: Negative. Neurological:  Negative for dizziness, weakness, numbness and headaches. Psychiatric/Behavioral: Negative. Negative for dysphoric mood. The patient is not nervous/anxious. Objective:     Physical Exam  Vitals reviewed. Constitutional:       Appearance: Normal appearance. He is normal weight. HENT:      Head: Normocephalic and atraumatic. Nose: Nose normal.      Mouth/Throat:      Mouth: Mucous membranes are moist.      Pharynx: Oropharynx is clear. Eyes:      Extraocular Movements: Extraocular movements intact. Conjunctiva/sclera: Conjunctivae normal.      Pupils: Pupils are equal, round, and reactive to light. Cardiovascular:      Rate and Rhythm: Normal rate and regular rhythm. Heart sounds: Normal heart sounds. No murmur heard. No gallop. Pulmonary:      Effort: Pulmonary effort is normal. No respiratory distress. Breath sounds: Normal breath sounds. No stridor. No wheezing. Abdominal:      General: Bowel sounds are normal. There is no distension. Palpations: Abdomen is soft. Tenderness: There is no abdominal tenderness. There is no guarding or rebound. Musculoskeletal:         General: No swelling or tenderness. Normal range of motion. Cervical back: Normal range of motion and neck supple. Skin:     General: Skin is warm and dry. Coloration: Skin is not jaundiced. Findings: No rash. Neurological:      General: No focal deficit present. Mental Status: He is alert and oriented to person, place, and time. Psychiatric:         Mood and Affect: Mood normal.         Behavior: Behavior normal.         Thought Content:  Thought content normal.         Judgment: Judgment normal.     /82   Pulse (!) 102   Temp 97.3 °F (36.3 °C)   Ht 5' 10\" (1.778 m)   Wt 180 lb (81.6 kg)   SpO2 96%   BMI 25.83 kg/m²     Assessment/Plan:   1. Cervical radiculopathy  -     [facility]  2. Right shoulder strain, subsequent encounter  -     traMADol (ULTRAM) 50 MG tablet; Take 1 tablet by mouth every 4 hours as needed for Pain for up to 3 days. Intended supply: 3 days. Take lowest dose possible to manage pain Max Daily Amount: 300 mg, Disp-18 tablet, R-0Normal  3. Prediabetes  -     Hemoglobin A1C; Future  -     TSH with Reflex; Future  4. Pure hypercholesterolemia  -     Lipid, Fasting; Future  5. Benign prostatic hyperplasia, unspecified whether lower urinary tract symptoms present  -     tamsulosin (FLOMAX) 0.4 MG capsule; Take 1 capsule by mouth daily, Disp-30 capsule, R-2Normal  6. Erectile dysfunction, unspecified erectile dysfunction type  -     tadalafil (CIALIS) 10 MG tablet; Take 1 tablet by mouth daily as needed for Erectile Dysfunction, Disp-10 tablet, R-0Normal  7. BMI 25.0-25.9,adult  -     CBC with Auto Differential; Future  -     Comprehensive Metabolic Panel, Fasting; Future  8. Tobacco abuse  -     nicotine (NICOTINE STEP 2) 14 MG/24HR; APPLY ONE PATCH TO THE SKIN DAILY FOR 14 DAYS, Disp-28 patch, R-2Normal      Cervical radiculopathy-referral placed for physical therapy    Right shoulder strain-taking muscle relaxers as needed, provided prescription for tramadol as needed    Prediabetes-last A1c 6.2, not on any medications currently, repeat ordered    Hyperlipidemia-on atorvastatin 20 mg daily, repeat lipid panel ordered    BPH-stable, on daily Flomax 0.4 mg    Patient was counseled on tobacco cessation.  Based upon patient's motivation to change his behavior, the following plan was agreed upon: patient will try the following tobacco cessation strategies:  nicotine replacement: Nicotine patches, risks and benefits of this medication discussed- patient will call for any significant adverse effects.  He was  provided with a list of local tobacco cessation resources. Provider spent 3 minutes counseling patient. Return in about 6 months (around 9/1/2023) for Follow up labs. Orders Placed This Encounter   Procedures    CBC with Auto Differential     Standing Status:   Future     Standing Expiration Date:   9/1/2023    Comprehensive Metabolic Panel, Fasting     Standing Status:   Future     Standing Expiration Date:   9/1/2023    Hemoglobin A1C     Standing Status:   Future     Standing Expiration Date:   9/1/2023    Lipid, Fasting     Standing Status:   Future     Standing Expiration Date:   9/1/2023    TSH with Reflex     Standing Status:   Future     Standing Expiration Date:   9/1/2023    [facility]     Referral Priority:   Routine     Referral Type:   Eval and Treat     Referral Reason:   Specialty Services Required     Requested Specialty:   Physical Therapist     Number of Visits Requested:   1     Orders Placed This Encounter   Medications    traMADol (ULTRAM) 50 MG tablet     Sig: Take 1 tablet by mouth every 4 hours as needed for Pain for up to 3 days. Intended supply: 3 days. Take lowest dose possible to manage pain Max Daily Amount: 300 mg     Dispense:  18 tablet     Refill:  0     Reduce doses taken as pain becomes manageable    tamsulosin (FLOMAX) 0.4 MG capsule     Sig: Take 1 capsule by mouth daily     Dispense:  30 capsule     Refill:  2    tadalafil (CIALIS) 10 MG tablet     Sig: Take 1 tablet by mouth daily as needed for Erectile Dysfunction     Dispense:  10 tablet     Refill:  0    nicotine (NICOTINE STEP 2) 14 MG/24HR     Sig: APPLY ONE PATCH TO THE SKIN DAILY FOR 14 DAYS     Dispense:  28 patch     Refill:  2       Patient given educational materials - see patient instructions. Discussed use, benefit, and side effects of prescribed medications. All patientquestions answered. Pt voiced understanding. Reviewed health maintenance.   Instructedto continue current medications, diet and exercise. Patient agreed with treatmentplan. Follow up as directed.      Electronically signed by Lew Dwyer MD on 3/2/2023 at 2:05 PM

## 2023-03-02 PROBLEM — M54.12 CERVICAL RADICULOPATHY: Status: ACTIVE | Noted: 2023-03-02

## 2023-03-02 PROBLEM — N40.0 BENIGN PROSTATIC HYPERPLASIA: Status: ACTIVE | Noted: 2023-03-02

## 2023-03-02 PROBLEM — N52.9 ERECTILE DYSFUNCTION: Status: ACTIVE | Noted: 2023-03-02

## 2023-03-02 ASSESSMENT — ENCOUNTER SYMPTOMS
SORE THROAT: 0
DIARRHEA: 0
SHORTNESS OF BREATH: 0
VOMITING: 0
NAUSEA: 0
COUGH: 0
ABDOMINAL PAIN: 0
EYE DISCHARGE: 0
CONSTIPATION: 0
CHEST TIGHTNESS: 0
WHEEZING: 0

## 2023-03-03 ENCOUNTER — HOSPITAL ENCOUNTER (OUTPATIENT)
Dept: PHYSICAL THERAPY | Facility: CLINIC | Age: 63
Setting detail: THERAPIES SERIES
Discharge: HOME | End: 2023-03-03
Payer: MEDICAID

## 2023-03-03 PROCEDURE — 97162 PT EVAL MOD COMPLEX 30 MIN: CPT

## 2023-03-03 PROCEDURE — 97110 THERAPEUTIC EXERCISES: CPT

## 2023-03-03 PROCEDURE — 97140 MANUAL THERAPY 1/> REGIONS: CPT

## 2023-03-03 NOTE — CONSULTS
[] [facility]) [facility] &  Therapy  [address]  [phone]  F: [phone] [] [address]  [facility]   [address]  P: [phone]  F: [phone] [] [address] &  Therapy  [address] [facility]  P: [phone]  F: [phone] [] [address]  P: [phone]  F: [phone] [] [address]  [facility]   [address]   [place]: [phone]  F: [phone]      Physical Therapy Spine Evaluation    Date:  3/3/2023  Patient: Jj eSgundo  : 1960  MRN: [id number]  Physician: ***   Insurance: ***  Medical Diagnosis: ***  Rehab Codes: ***  Onset Date: ***  Next 's appt.: ***    Subjective:   CC:  HPI: (onset date)    PMHx: [] Unremarkable [] Diabetes [] HTN  [] Pacemaker   [] MI/Heart Problems [] Cancer [] Arthritis [] Other:              [] Refer to full medical chart  In EPIC       Comorbidities:   [] Obesity [] Dialysis  [] N/A   [] Asthma/COPD [] Dementia [] Other:   [] Stroke [] Sleep apnea [] Other:   [] Vascular disease [] Rheumatic disease [] Other:     Tests: [] X-Ray: [] MRI:  [] Other:    Medications: [] Refer to full medical record [] None [] Other:  Allergies:      [] Refer to full medical record [] None [] Other:    Function:  Hand Dominance  [] Right  [] Left  Patient lives with:     In what type of home []  One story   [] Two story   [] Split level   Number of stairs to enter    With handrail on the []  Right to enter   [] Left to enter   Bathroom has a []  Tub only  [] Tub/shower combo   [] Walk in shower    []  Grab bars   Washing machine is on []  Main level   [] Second level   [] Basement   Employer    Job Status []  Normal duty   [] Light duty   [] Off due to condition    []  Retired   [] Not employed   [] Disability  [] Other:  []  Return to work: Work activities/duties        ADL/IADL Previous level of function Current level of function Who currently assists the patient with task   Bathing  [] Independent  [] Assist [] Independent  [] Assist    Dress/grooming [] Independent  [] Assist [] Independent  [] Assist    Transfer/mobility [] Independent  [] Assist [] Independent  [] Assist    Feeding [] Independent  [] Assist [] Independent  [] Assist    Toileting [] Independent  [] Assist [] Independent  [] Assist    Driving [] Independent  [] Assist [] Independent  [] Assist    Housekeeping [] Independent  [] Assist [] Independent  [] Assist    Grocery shop/meal prep [] Independent  [] Assist [] Independent  [] Assist      Gait Prior level of function Current level of function    [] Independent  [] Assist [] Independent  [] Assist   Device: [] Independent [] Independent    [] Straight Cane [] Quad cane [] Straight Cane [] Quad cane    [] Standard walker [] Rolling walker   [] 4 wheeled walker [] Standard walker [] Rolling walker   [] 4 wheeled walker    [] Wheelchair [] Wheelchair     Pain:  [] Yes  [] No Location: *** Pain Rating: (0-10 scale) ***/10  Pain altered Tx:  [] Yes  [] No  Action:    Symptoms:  [] Improving [] Worsening [] Same  Better:  [] AM    [] PM    [] Sit    [] Rise/Sit    []Stand    [] Walk    [] Lying    [] Other:  Worse: [] AM    [] PM    [] Sit    [] Rise/Sit    []Stand    [] Walk    [] Lying    [] Bend                      [] Valsalva    [] Other:  Sleep: [] OK    [] Disturbed    Objective:      STRENGTH  STRENGTH  ROM    Left Right  Left Right Cervical    C5 Shld Abd   L1-2 Hip Flex   Flexion    Shld Flexion   Hip Abd   Extension    Shld IR   L3-4 Knee Ext   Rotation L R   Shld ER   L4 Ankle DF   Sidebend L R   C6 Elb Flex   L5 EHL   Retraction    C7 Elb Ext   S1 Plant.  Flex   Lumbar    C8 EPL   Abdominals   Flexion    T1 Fing Abd   Erector Spinae   Extension          Rotation L  R Sidebend L R         UE/LE                                                              TESTS (+/-) LEFT RIGHT Not Tested   SLR [] sit [] supine   []   Hamstring (SLR)   []   SKTC   []   DKTC   []   Slump/Dural   []   SI JT   []   ANDREA   []   Joint Mobility   []   Cerv. Comp   []   Cerv. Distraction   []   Cerv. Alar/Transverse   []   Vertebral Artery   []   Adsons   []   [place]   []   Reji Tests ? Pain ? Pain No Change Not Tested   RFIS [] [] [] []   JUDY [] [] [] []   RFIL [] [] [] []   REIL [] [] [] []   Rep Prot [] [] [] []   Rep Retract [] [] [] []       OBSERVATION No Deficit Deficit Not Tested Comments   Posture       Forward Head [] [] []    Rounded Shoulders [] [] []    Kyphosis [] [] []    Lordosis [] [] []    Lateral Shift [] [] []    Scoliosis [] [] []    Iliac Crest [] [] []    PSIS [] [] []    ASIS [] [] []    Genu Valgus [] [] []    Genu Varus [] [] []    Genu Recurvatum [] [] []    Pronation [] [] []    Supination [] [] []    Leg Length Discrp [] [] []    Slumped Sitting [] [] []    Palpation [] [] []    Sensation [] [] []    Edema [] [] []    Neurological [] [] []        Functional Test: *** Score: ***% functionally impaired     Comments:    Assessment:  Patient would benefit from skilled physical therapy services in order to: ***    Problem list, as detailed above:   [] ? Back Pain:    [] ? Cervical Pain:    [] ? ROM:     [] ? Strength:   [] ? Function:  [] Postural Deviations  [] Gait Deviations  [] Other:     STG: (to be met in *** treatments)  ? Pain:  ? ROM:  ? Strength:  ? Function:  Patient to be independent with home exercise program as demonstrated by performance with correct form without cues.   Demonstrate Knowledge of fall prevention  LTG: (to be met in *** treatments)          Patient goals:    Rehab Potential:  [] Good  [] Fair  [] Poor   Suggested Professional Referral:  [] No  [] Yes:  Barriers to Goal Achievement:  [] No  [] Yes:  Domestic Concerns:  [] No  [] Yes:    Pt. Education:  [] Plans/Goals, Risks/Benefits discussed  [] Home exercise program  Method of Education: [] Verbal  [] Demo  [] Written  Comprehension of Education:  [] Verbalizes understanding. [] Demonstrates understanding. [] Needs Review. [] Demonstrates/verbalizes understanding of HEP/Ed previously given. Treatment Plan:  [] Therapeutic Exercise   28742  [] Iontophoresis: 4 mg/mL Dexamethasone Sodium Phosphate  mAmin  04451   [] Therapeutic Activity  46877 [] Vasopneumatic cold with compression  50256    [] Gait Training   82341 [] Ultrasound   41735   [] Neuromuscular Re-education  26844 [] Electrical Stimulation Unattended  86982   [] Manual Therapy  69685 [] Electrical Stimulation Attended  08848   [] Instruction in HEP  [] Lumbar/Cervical Traction  36796   [] Aquatic Therapy   35248 [] Cold/hotpack    [] Massage   80190      [] Dry Needling, 1 or 2 muscles  06749   [] Biofeedback, first 15 minutes   11731  [] Biofeedback, additional 15 minutes   75489 [] Dry Needling, 3 or more muscles  92523     []  Medication allergies reviewed for use of    Dexamethasone Sodium Phosphate 4mg/ml     with iontophoresis treatments. Pt is not allergic.     Frequency:  *** x/week for *** visits    Todays Treatment:  Modalities:   Precautions:  Exercises:  Exercise Reps/ Time Weight/ Level Comments                                 Other:    Specific Instructions for next treatment:      Evaluation Complexity:  History (Personal factors, comorbidities) [] 0 [] 1-2 [] 3+   Exam (limitations, restrictions) [] 1-2 [] 3 [] 4+   Clinical presentation (progression) [] Stable [] Evolving  [] Unstable   Decision Making [] Low [] Moderate [] High    [] Low Complexity [] Moderate Complexity [] High Complexity       Treatment Charges: Mins Units   [] Evaluation       []  Low       []  Moderate       []  High  1   []  Modalities     []  Ther Exercise     []  Manual Therapy     []  Ther Activities     []  Aquatics     [] Vasocompression     []  Other       TOTAL TREATMENT TIME: ***    Time in: ***      Time out: ***    Electronically signed by: Miranda Suarez PT        Physician Signature:________________________________Date:__________________  By signing above or cosigning this note, I have reviewed this plan of care and certify a need for medically necessary rehabilitation services.      *PLEASE SIGN ABOVE AND FAX BACK ALL PAGES*

## 2023-03-06 ENCOUNTER — HOSPITAL ENCOUNTER (OUTPATIENT)
Dept: PHYSICAL THERAPY | Facility: CLINIC | Age: 63
Setting detail: THERAPIES SERIES
Discharge: HOME | End: 2023-03-06
Payer: MEDICAID

## 2023-03-06 PROCEDURE — 97016 VASOPNEUMATIC DEVICE THERAPY: CPT

## 2023-03-06 PROCEDURE — 97110 THERAPEUTIC EXERCISES: CPT

## 2023-03-06 NOTE — FLOWSHEET NOTE
[] [facility] TWELVEDogsterSTEP [facility] &  Therapy  [address]  [phone]  F: [phone] [] [address]  [facility]   [address]  P: [phone]  F: [phone] [] [place] &  Therapy  [address]  P: [phone]  F: [phone] [x] [address]  P: [phone]  F: [phone] [] [address]  [facility]   [address]   [place]: [phone]  F: [phone]      Physical Therapy Daily Treatment Note    Date:  3/6/2023  Patient Name:  Evens Renteria    :  1960  MRN: [id number]  Physician: Mary Pedroza MD                                    Insurance: [employer], medicaid (30 visits  Medical Diagnosis: M54.12 (ICD-10-CM) - Cervical radiculopathy             Rehab Codes: Right shoulder pain M25.511  Onset Date: 23               Next 's appt.: Pending  Visit# / total visits:     Cancels/No Shows: 0/0    Subjective:    Pain:  [] Yes  [] No Location: R shoulder, points to medial deltoid Pain Rating: (0-10 scale) 0/10- currently, was up to 9/10 over weekend  Pain altered Tx:  [] No  [] Yes  Action:  Comments: Pt arrives noting that neck pain has completely resolved, currently reports sharp shoulder pain over the weekend. Objective:  Modalities:   Precautions: lung disease, current smoker  Exercises:  Exercise Reps/ Time Weight/ Level Comments   Left flexion/SB stretch 3x30\"                  Sidelying         ER 2x10 active     Abduction 2x10 active     Flexion 2x10 active          Shoulder flex and abd wall slides 10x     Shoulder flex and abd isometric 10x10\" hold     Other: Patient education on HEP and directional preference. Instructed to avoid cervical extension or right rotation.      Manual: HELD 3/6 d/t arriving without pain  Prone MFR to upper traps  Supine SOR  Supine manual traction      Access Code: [email or web address]  URL: [email or web address]. com/  Date: 03/06/2023  Prepared by: Judy Castañeda     Exercises  Left rotation stretch - 4 x daily - 7 x weekly - 1 sets - 1 reps - 30 hold        Specific Instructions for next treatment: Manual therapy PRN, Right foramen opening exercises, progress to postural strengthening      Vasocompression: 15' low compression R shoulder seated 34 deg    Treatment Charges: Mins Units   []  Modalities     [x]  Ther Exercise 30 2   []  Manual Therapy     []  Ther Activities     []  Aquatics     [x]  Vasocompression 15 1   []  Other     Total Treatment time 45 3       Assessment: [x] Progressing toward goals. Shifted focus to shoulder this date d/t pt denying any neck pain after evaluation and only having shoulder pain over the weekend. Therefore progressed to shoulder strengthening as listed above; pt noting difficulty with shoulder abduction. Ended with vasocompression to decrease post exercise soreness. [] No change. [] Other:  [] Patient would continue to benefit from skilled physical therapy services in order to: The patient is a 63year old male with a chief complaint of right shoulder pain and signs and symptoms consistent with a diagnosis of cervical radiculopathy. He presents with pain, weakness, decreased range of motion and functional limitations. He will benefit from skilled PT to address above deficts. Problem list, as detailed above:   [] ? Back Pain:                         [x] ? Cervical Pain:        [x] ? ROM:                     [x] ? Strength:    [x] ? Function:  [x] Postural Deviations  [] Gait Deviations  [] Other:              STG: (to be met in 6 treatments)  ? Pain: 4/10  Patient to be independent with home exercise program as demonstrated by performance with correct form without cues.      LTG: (to be met in 16 treatments)  Patient will sleep without limitation. Patient will do all house work without increase in pain. Patient will Patient will drive without limitation from neck or shoulder pain. Patient goals: To reduce pain    Pt. Education:  [x] Yes  [] No  [] Reviewed Prior HEP/Ed  Method of Education: [x] Verbal  [x] Demo  [] Written  Comprehension of Education:  [x] Verbalizes understanding. [x] Demonstrates understanding. [] Needs review. [] Demonstrates/verbalizes HEP/Ed previously given. Plan: [x] Continue current frequency toward long and short term goals.           Time In:4:05 pm            Time Out: 4:50pm    Electronically signed by:  José Miguel Panda, PT

## 2023-03-07 ENCOUNTER — APPOINTMENT (OUTPATIENT)
Dept: PHYSICAL THERAPY | Facility: CLINIC | Age: 63
End: 2023-03-07
Payer: MEDICAID

## 2023-03-09 ENCOUNTER — HOSPITAL ENCOUNTER (OUTPATIENT)
Dept: PHYSICAL THERAPY | Facility: CLINIC | Age: 63
Setting detail: THERAPIES SERIES
Discharge: HOME | End: 2023-03-09
Payer: MEDICAID

## 2023-03-09 PROCEDURE — 97110 THERAPEUTIC EXERCISES: CPT

## 2023-03-09 PROCEDURE — 97140 MANUAL THERAPY 1/> REGIONS: CPT

## 2023-03-09 PROCEDURE — 97016 VASOPNEUMATIC DEVICE THERAPY: CPT

## 2023-03-09 NOTE — FLOWSHEET NOTE
[] [facility]) - [facility] &  Therapy  [address]  [phone]  F: [phone] [] [address]  [place] [address]   [address]  P: [phone]  F: [phone] [] [place]  Therapy  [address]  P: [phone]  F: [phone] [x] [address]  P: [phone]  F: [phone] [] [address]  [facility]   [address]   [place]: [phone]  F: [phone]      Physical Therapy Daily Treatment Note    Date:  3/9/2023  Patient Name:  Cesar Farmer    :  1960  MRN: [id number]  Physician: Pattie Bliss MD                                    Insurance: [employer], medicaid (30 visits  Medical Diagnosis: M54.12 (ICD-10-CM) - Cervical radiculopathy             Rehab Codes: Right shoulder pain M25.511  Onset Date: 23               Next 's appt.: Pending  Visit# / total visits: 3/16    Cancels/No Shows: 0/0    Subjective:    Pain:  [x] Yes  [] No Location: R shoulder, points to medial deltoid Pain Rating: (0-10 scale) 0/10- currently  Pain altered Tx:  [] No  [] Yes  Action:  Comments: Pt reports excruciating pain after last visit, however has been better in the last 12 hours. Pt denies any pain upon arrival, however c/o pain with attempted active R shld ABD. Objective:  Modalities:   Precautions: lung disease, current smoker  Exercises:  Exercise Reps/ Time Weight/ Level Comments    Left flexion/SB stretch 3x30\"     x               Sidelying          ER 2x10 active   x   Abduction 2x10 active   x   Flexion 2x10 active  x          Wall slides  10x  Flexion  x   Reactive Abd isometric 10x Green tube  x   AAROM via wand  2x10  Flex, ABD  x   Other: Patient education on HEP and directional preference.    Instructed to avoid cervical extension or right rotation. Manual: HELD 3/6 d/t arriving without pain  Prone MFR to upper traps  Supine SOR-   Supine manual traction      Access Code: [email or web address]  URL: [email or web address]. com/  Date: 03/06/2023  Prepared by: Aaron Gracia     Exercises  Left rotation stretch - 4 x daily - 7 x weekly - 1 sets - 1 reps - 30 hold        Specific Instructions for next treatment: Manual therapy PRN, Right foramen opening exercises, progress to postural strengthening      Vasocompression: 15' low compression R shoulder seated 34 deg    Treatment Charges: Mins Units   []  Modalities     [x]  Ther Exercise 30 2   [x]  Manual Therapy 15 1   []  Ther Activities     []  Aquatics     [x]  Vasocompression 15 1   []  Other     Total Treatment time 60 4       Assessment: [x] Progressing toward goals. Resumed manual to neck and shoulders, however pt reports immediate pain in R deltoid region, therefore began with STM in prone followed by supine SOR and gentle cervical distraction in approx 30 deg of neck flexion, with sig improved tolerance and no c/o pain in the upper arm while in the flexed position. Pt able to continue with gentle neck stretching and gentle R shld strengthening and AAROM with good tolerance. [] No change. [] Other:  [x] Patient would continue to benefit from skilled physical therapy services in order to: The patient is a 63year old male with a chief complaint of right shoulder pain and signs and symptoms consistent with a diagnosis of cervical radiculopathy. He presents with pain, weakness, decreased range of motion and functional limitations. He will benefit from skilled PT to address above deficts. Problem list, as detailed above:   [] ? Back Pain:                         [x] ? Cervical Pain:        [x] ? ROM:                     [x] ? Strength:    [x] ? Function:  [x] Postural Deviations  [] Gait Deviations  [] Other:              STG: (to be met in 6 treatments)  ?  Pain: 4/10  Patient to be independent with home exercise program as demonstrated by performance with correct form without cues. LTG: (to be met in 16 treatments)  Patient will sleep without limitation. Patient will do all house work without increase in pain. Patient will Patient will drive without limitation from neck or shoulder pain. Patient goals: To reduce pain    Pt. Education:  [x] Yes  [] No  [] Reviewed Prior HEP/Ed  Method of Education: [x] Verbal  [x] Demo  [] Written  Comprehension of Education:  [x] Verbalizes understanding. [x] Demonstrates understanding. [] Needs review. [] Demonstrates/verbalizes HEP/Ed previously given. Plan: [x] Continue current frequency toward long and short term goals.           Time In: 4:05 pm            Time Out: 4:50pm    Electronically signed by:  Tono Palacios PTA

## 2023-03-13 ENCOUNTER — HOSPITAL ENCOUNTER (OUTPATIENT)
Dept: PHYSICAL THERAPY | Facility: CLINIC | Age: 63
Setting detail: THERAPIES SERIES
Discharge: HOME | End: 2023-03-13
Payer: MEDICAID

## 2023-03-13 PROCEDURE — 97140 MANUAL THERAPY 1/> REGIONS: CPT

## 2023-03-13 PROCEDURE — 97110 THERAPEUTIC EXERCISES: CPT

## 2023-03-13 NOTE — FLOWSHEET NOTE
[] [facility]) - [facility] &  Therapy  [address]  [phone]  F: [phone] [] [address]  [employer] [address]   [address]  P: [phone]  F: [phone] [] [place]  Therapy  [address]  P: [phone]  F: [phone] [x] [address]  P: [phone]  F: [phone] [] [address]  [facility]   [address]   [place]: [phone]  F: [phone]      Physical Therapy Daily Treatment Note    Date:  3/13/2023  Patient Name:  Zack Tse    :  1960  MRN: [id number]  Physician: Daryl Sweeney MD                                    Insurance: [facility] [employer], medicaid (30 visits  Medical Diagnosis: M54.12 (ICD-10-CM) - Cervical radiculopathy             Rehab Codes: Right shoulder pain M25.511  Onset Date: 23               Next 's appt.: Pending  Visit# / total visits:     Cancels/No Shows: 0/0    Subjective:    Pain:  [x] Yes  [] No Location: R shoulder, points to medial deltoid Pain Rating: (0-10 scale) 0/10- currently  Pain altered Tx:  [] No  [] Yes  Action:  Comments: Pt reports having period where his shoulder feels good but if he moves wrong he has severe pain. Overall it is improved since the first day. Objective:  Modalities:   Precautions: lung disease, current smoker  Exercises:  Exercise Reps/ Time Weight/ Level Comments    Left flexion/SB stretch 3x30\"     x               Sidelying          ER 2x15 0/2#   x   Abduction 2x10 active   x   Flexion 2x10 active  x          Wall slides  10x  Flexion     Reactive Abd isometric 10x Green tube     AAROM via wand  2x10  Flex, ABD  x   Other: Patient education on HEP and directional preference. Instructed to avoid cervical extension or right rotation. Manual:   Prone MFR to upper traps, splenius and posterior cuff  Supine SOR-   Supine manual traction       Patient was informed of the potential benefits of Dry Needling. Patient was informed of risks including but not limited to drowsiness, dizziness, minor bruising or bleeding, temporary pain, tingling, numbness and a low risk of pneumothorax. Patient gave verbal consent to proceed and signed a Dry Needling Acknowledgement form. Dry Needling performed in conjunction with Manual therapy to promote tissue extensibility, improve ROM, and reduce pain. No charge submitted for the time the needle was inserted. 2 right splenius trigger points treated with a 25 mm needle using a bony backdrop for safety. Multiple twitch responses produced. 1 right upper trapezius trigger point treated with a 50 mm needle using a thread techniue for safety. Multiple twitch responses produced. Access Code: [email or web address]  URL: [email or web address]. com/  Date: 03/06/2023  Prepared by: Robe Barlow     Exercises  Left rotation stretch - 4 x daily - 7 x weekly - 1 sets - 1 reps - 30 hold        Specific Instructions for next treatment: Manual therapy PRN, Right foramen opening exercises, progress to postural strengthening      Vasocompression: 15' low compression R shoulder seated 34 deg    Treatment Charges: Mins Units   []  Modalities     [x]  Ther Exercise 15 1   [x]  Manual Therapy 25 2   []  Ther Activities     []  Aquatics     []  Vasocompression     [x]  Other: Dry Needling 3    Total Treatment time 43 3       Assessment: [x] Progressing toward goals. Began with prone MT with noted tenderness with reproduction of symptoms with MFR to splenius. Did an trial of Dry needling with no noted change, continued with supine MT with improved tolerance. He continues to have reproduction of symptoms with right cervical rotation. Will progress shoulder exercises and add scapula and cuff strength to HEP.     [] No change. [] Other:  [x] Patient would continue to benefit from skilled physical therapy services in order to: The patient is a 63year old male with a chief complaint of right shoulder pain and signs and symptoms consistent with a diagnosis of cervical radiculopathy. He presents with pain, weakness, decreased range of motion and functional limitations. He will benefit from skilled PT to address above deficts. Problem list, as detailed above:   [] ? Back Pain:                         [x] ? Cervical Pain:        [x] ? ROM:                     [x] ? Strength:    [x] ? Function:  [x] Postural Deviations  [] Gait Deviations  [] Other:              STG: (to be met in 6 treatments)  ? Pain: 4/10  Patient to be independent with home exercise program as demonstrated by performance with correct form without cues. LTG: (to be met in 16 treatments)  Patient will sleep without limitation. Patient will do all house work without increase in pain. Patient will Patient will drive without limitation from neck or shoulder pain. Patient goals: To reduce pain    Pt. Education:  [x] Yes  [] No  [] Reviewed Prior HEP/Ed  Method of Education: [x] Verbal  [x] Demo  [] Written  Comprehension of Education:  [x] Verbalizes understanding. [x] Demonstrates understanding. [] Needs review. [] Demonstrates/verbalizes HEP/Ed previously given. Plan: [x] Continue current frequency toward long and short term goals.           Time In: 11:30 am            Time Out: 12:13 pm    Electronically signed by:  Alissa Pickett, PT

## 2023-03-16 ENCOUNTER — HOSPITAL ENCOUNTER (OUTPATIENT)
Dept: PHYSICAL THERAPY | Facility: CLINIC | Age: 63
Setting detail: THERAPIES SERIES
Discharge: HOME | End: 2023-03-16
Payer: MEDICAID

## 2023-03-16 PROCEDURE — 97140 MANUAL THERAPY 1/> REGIONS: CPT

## 2023-03-16 PROCEDURE — 97110 THERAPEUTIC EXERCISES: CPT

## 2023-03-16 NOTE — FLOWSHEET NOTE
[] [facility]) - [facility] &  Therapy  [address]  [phone]  F: [phone] [] [address]  [place] [address]   [address]  P: [phone]  F: [phone] [] [place] &  Therapy  [address]  P: [phone]  F: [phone] [x] [address]  P: [phone]  F: [phone] [] [address]  [facility]   [address]   [place]: [phone]  F: [phone]      Physical Therapy Daily Treatment Note    Date:  3/16/2023  Patient Name:  Ney Tejeda    :  1960  MRN: [id number]  Physician: Bart Rascon MD                                    Insurance: [facility], medicaid (30 visits  Medical Diagnosis: M54.12 (ICD-10-CM) - Cervical radiculopathy             Rehab Codes: Right shoulder pain M25.511  Onset Date: 23               Next 's appt.: Pending  Visit# / total visits:     Cancels/No Shows: 0/0    Subjective:    Pain:  [x] Yes  [] No  Location: R shoulder  Pain Rating: (0-10 scale) 0-/10 in neck,  7-8/10 in shoulder   Pain altered Tx:  [] No  [] Yes  Action:  Comments: Pt reports pain was different today, stating shoulder pain was less intense when he woke up. However pt rates R shoulder pain at 7-8/10 upon arrival, states pain is more global and denies pain over deltoid region.      Objective:  Modalities:   Precautions: lung disease, current smoker  Exercises:  Exercise Reps/ Time Weight/ Level Comments    UBE  2' ea  L0 Cues for good posture x   Left flexion/SB stretch 3x30\"     x               Sidelying          ER 2x15 2#   x   Abduction 2x15 active   x   Flexion 2x15 active  x          Wall slides  10x  Flexion  --   Reactive Abd isometric 10x Green tube  --   AAROM via wand  2x10  Flex, ABD  x   Other: Patient education on HEP and directional preference. Instructed to avoid cervical extension or right rotation. Manual:   Prone MFR to upper traps, splenius and posterior cuff  Supine SOR-   Supine manual traction       Patient was informed of the potential benefits of Dry Needling. Patient was informed of risks including but not limited to drowsiness, dizziness, minor bruising or bleeding, temporary pain, tingling, numbness and a low risk of pneumothorax. Patient gave verbal consent to proceed and signed a Dry Needling Acknowledgement form. Dry Needling performed in conjunction with Manual therapy to promote tissue extensibility, improve ROM, and reduce pain. No charge submitted for the time the needle was inserted. 2 right splenius trigger points treated with a 25 mm needle using a bony backdrop for safety. Multiple twitch responses produced. 1 right upper trapezius trigger point treated with a 50 mm needle using a thread techniue for safety. Multiple twitch responses produced. Access Code: [email or web address]  URL: [email or web address]  Date: 03/06/2023  Prepared by: Justine Atwood     Exercises  Left rotation stretch - 4 x daily - 7 x weekly - 1 sets - 1 reps - 30 hold        Specific Instructions for next treatment: Manual therapy PRN, Right foramen opening exercises, progress to postural strengthening      Vasocompression: 15' low compression R shoulder seated 34 deg    Treatment Charges: Mins Units   []  Modalities     [x]  Ther Exercise 25 2   [x]  Manual Therapy 20 1   []  Ther Activities     []  Aquatics     []  Vasocompression     []  Other: Dry Needling     Total Treatment time 45 3       Assessment: [x] Progressing toward goals. Began session with manual as listed above. Pt cont to report some relief with cervical traction. Pt then continued with gentle RTC strengthening exercises and AAROM with wand without complaints.  Added UBE at the end of session with min cueing throughout for upright posture. [] No change. [] Other:  [x] Patient would continue to benefit from skilled physical therapy services in order to: The patient is a 63year old male with a chief complaint of right shoulder pain and signs and symptoms consistent with a diagnosis of cervical radiculopathy. He presents with pain, weakness, decreased range of motion and functional limitations. He will benefit from skilled PT to address above deficts. Problem list, as detailed above:   [] ? Back Pain:                         [x] ? Cervical Pain:        [x] ? ROM:                     [x] ? Strength:    [x] ? Function:  [x] Postural Deviations  [] Gait Deviations  [] Other:              STG: (to be met in 6 treatments)  ? Pain: 4/10  Patient to be independent with home exercise program as demonstrated by performance with correct form without cues. LTG: (to be met in 16 treatments)  Patient will sleep without limitation. Patient will do all house work without increase in pain. Patient will Patient will drive without limitation from neck or shoulder pain. Patient goals: To reduce pain    Pt. Education:  [x] Yes  [] No  [] Reviewed Prior HEP/Ed  Method of Education: [x] Verbal  [x] Demo  [] Written  Comprehension of Education:  [x] Verbalizes understanding. [x] Demonstrates understanding. [] Needs review. [] Demonstrates/verbalizes HEP/Ed previously given. Plan: [x] Continue current frequency toward long and short term goals.           Time In: 12:05pm           Time Out: 12:50pm    Electronically signed by:  Gladys Marin PTA

## 2023-03-19 DIAGNOSIS — N52.9 ERECTILE DYSFUNCTION, UNSPECIFIED ERECTILE DYSFUNCTION TYPE: ICD-10-CM

## 2023-03-20 ENCOUNTER — HOSPITAL ENCOUNTER (OUTPATIENT)
Dept: PHYSICAL THERAPY | Facility: CLINIC | Age: 63
Setting detail: THERAPIES SERIES
Discharge: HOME | End: 2023-03-20
Payer: MEDICAID

## 2023-03-20 PROCEDURE — 97016 VASOPNEUMATIC DEVICE THERAPY: CPT

## 2023-03-20 PROCEDURE — 97110 THERAPEUTIC EXERCISES: CPT

## 2023-03-20 RX ORDER — TADALAFIL 10 MG/1
TABLET ORAL
Qty: 10 TABLET | Refills: 0 | Status: SHIPPED | OUTPATIENT
Start: 2023-03-20

## 2023-03-20 NOTE — FLOWSHEET NOTE
[] [address] [facility] &  Therapy  [address]  [phone]  F: [phone] [] [address]  [employer] [address]   [address]  P: [phone]  F: [phone] [] [place] &  Therapy  [address]  P: [phone]  F: [phone] [x] [address]  P: [phone]  F: [phone] [] [address]  [facility]   [address]   [place]: [phone]  F: [phone]      Physical Therapy Daily Treatment Note    Date:  3/20/2023  Patient Name:  Nikita France    :  1960  MRN: [id number]  Physician: Denise Nichols MD                                    Insurance: [employer], medicaid (30 visits  Medical Diagnosis: M54.12 (ICD-10-CM) - Cervical radiculopathy             Rehab Codes: Right shoulder pain M25.511  Onset Date: 23               Next 's appt.: Pending  Visit# / total visits:     Cancels/No Shows: 0/0    Subjective:    Pain:  [x] Yes  [] No  Location: R shoulder  Pain Rating: (0-10 scale) 0-/10 in neck, 5-6/10 in shoulder   Pain altered Tx:  [] No  [] Yes  Action:  Comments: Pt states R shoulder was horrible after last visit, for the whole weekend, rating 15/10. Objective:  Modalities:   Precautions: lung disease, current smoker  Exercises:  Exercise Reps/ Time Weight/ Level Comments    UBE  2' ea  L0 Cues for good posture x   Left flexion/SB stretch 3x30\"     x               Sidelying          ER 2x10 0#   x   Abduction 2x10 0#   x   Flexion 2x10 0#  x          Wall slides  10x  Flexion  x   Reactive Abd isometric 10x Green tube  --   AAROM via wand  2x10  Flex, ABD  x          Rows   [place] Next  -   Shld ext   [employer]  Next  -   Other: Patient education on HEP and directional preference.    Instructed to avoid cervical

## 2023-03-20 NOTE — TELEPHONE ENCOUNTER
Shilpa Aranaing is calling to request a refill on the following medication(s):    Medication Request:  Requested Prescriptions     Pending Prescriptions Disp Refills    tadalafil (CIALIS) 10 MG tablet [Pharmacy Med Name: TADALAFIL 10 MG TABLET] 10 tablet 0     Sig: TAKE ONE TABLET BY MOUTH DAILY AS NEEDED FOR  ERECTILE DYSFUNCTION       Last Visit Date (If Applicable):  7/3/7943    Next Visit Date:    9/1/2023

## 2023-03-22 ENCOUNTER — HOSPITAL ENCOUNTER (EMERGENCY)
Age: 63
Discharge: HOME | End: 2023-03-23
Attending: SPECIALIST
Payer: MEDICAID

## 2023-03-22 ENCOUNTER — APPOINTMENT (OUTPATIENT)
Dept: GENERAL RADIOLOGY | Age: 63
End: 2023-03-22
Payer: MEDICAID

## 2023-03-22 DIAGNOSIS — M75.81 TENDINITIS OF RIGHT ROTATOR CUFF: Primary | ICD-10-CM

## 2023-03-22 PROCEDURE — 99283 EMERGENCY DEPT VISIT LOW MDM: CPT

## 2023-03-22 PROCEDURE — 73030 X-RAY EXAM OF SHOULDER: CPT

## 2023-03-22 ASSESSMENT — PAIN - FUNCTIONAL ASSESSMENT: PAIN_FUNCTIONAL_ASSESSMENT: 0-10

## 2023-03-22 ASSESSMENT — PAIN SCALES - GENERAL: PAINLEVEL_OUTOF10: 10

## 2023-03-23 VITALS
WEIGHT: 180 LBS | SYSTOLIC BLOOD PRESSURE: 127 MMHG | RESPIRATION RATE: 18 BRPM | HEART RATE: 98 BPM | OXYGEN SATURATION: 95 % | BODY MASS INDEX: 25.77 KG/M2 | DIASTOLIC BLOOD PRESSURE: 74 MMHG | HEIGHT: 70 IN | TEMPERATURE: 98.2 F

## 2023-03-23 PROCEDURE — 6370000000 HC RX 637 (ALT 250 FOR IP): Performed by: SPECIALIST

## 2023-03-23 RX ORDER — PREDNISONE 20 MG/1
20 TABLET ORAL 2 TIMES DAILY
Qty: 10 TABLET | Refills: 0 | Status: SHIPPED | OUTPATIENT
Start: 2023-03-23 | End: 2023-03-28

## 2023-03-23 RX ORDER — PREDNISONE 20 MG/1
20 TABLET ORAL ONCE
Status: COMPLETED | OUTPATIENT
Start: 2023-03-23 | End: 2023-03-23

## 2023-03-23 RX ADMIN — PREDNISONE 20 MG: 20 TABLET ORAL at 00:37

## 2023-03-23 ASSESSMENT — ENCOUNTER SYMPTOMS
SHORTNESS OF BREATH: 0
ABDOMINAL PAIN: 0
COUGH: 0
VOMITING: 0
NAUSEA: 0

## 2023-03-23 NOTE — DISCHARGE INSTRUCTIONS
Please understand that at this time there is no evidence for a more serious underlying process, but that early in the process of an illness or injury, an emergency department workup can be falsely reassuring. You should contact your family doctor within the next 48 hours for a follow up appointment    Steven Ponce!!!    From [address] and [facility]    On behalf of the Emergency Department staff at [address], I would like to thank you for giving us the opportunity to address your health care needs and concerns. We hope that during your visit, our service was delivered in a professional and caring manner. Please keep [address] in mind as we walk with you down the path to your own personal wellness. Please expect an automated text message or email from us so we can ask a few questions about your health and progress. Based on your answers, a clinician may call you back to offer help and instructions. Please understand that early in the process of an illness or injury, an emergency department workup can be falsely reassuring. If you notice any worsening, changing or persistent symptoms please call your family doctor or return to the ER immediately. Tell us how we did during your visit at [email or web address]   and let us know about your experience

## 2023-03-23 NOTE — ED PROVIDER NOTES
All other systems reviewed and are negative. PAST MEDICAL HISTORY    has a past medical history of COPD (chronic obstructive pulmonary disease) ([facility] [address] Gingival and periodontal disease, Hyperlipidemia, Primary osteoarthritis of both hips, Primary osteoarthritis of left hip, and Seasonal allergies. SURGICAL HISTORY      has a past surgical history that includes Colonoscopy; Inguinal hernia repair (Right, 09/30/2022); and hernia repair (Right, 9/30/2022).     CURRENT MEDICATIONS       Discharge Medication List as of 3/23/2023 12:31 AM        CONTINUE these medications which have NOT CHANGED    Details   tadalafil (CIALIS) 10 MG tablet TAKE ONE TABLET BY MOUTH DAILY AS NEEDED FOR  ERECTILE DYSFUNCTION, Disp-10 tablet, R-0Normal      tamsulosin (FLOMAX) 0.4 MG capsule Take 1 capsule by mouth daily, Disp-30 capsule, R-2Normal      nicotine (NICOTINE STEP 2) 14 MG/24HR APPLY ONE PATCH TO THE SKIN DAILY FOR 14 DAYS, Disp-28 patch, R-2Normal      ibuprofen (ADVIL;MOTRIN) 800 MG tablet Take 1 tablet by mouth 2 times daily as needed for Pain, Disp-60 tablet, R-5Normal      ipratropium-albuterol (DUONEB) 0.5-2.5 (3) MG/3ML SOLN nebulizer solution Inhale 3 mLs into the lungs every 4 hours, Disp-360 mL, R-11Normal      vitamin D (ERGOCALCIFEROL) 1.25 MG (57919 UT) CAPS capsule TAKE ONE CAPSULE BY MOUTH ONCE WEEKLY, Disp-4 capsule, R-2Normal      docusate sodium (COLACE) 100 MG capsule Take 1 capsule by mouth 2 times daily, Disp-20 capsule, R-0Normal      atorvastatin (LIPITOR) 20 MG tablet TAKE ONE TABLET BY MOUTH DAILY, Disp-30 tablet, R-5Normal      albuterol sulfate HFA (PROVENTIL;VENTOLIN;PROAIR) 108 (90 Base) MCG/ACT inhaler Inhale 2 puffs into the lungs every 4 hours as needed for Wheezing, Disp-8.5 g, R-1Normal      fluticasone-salmeterol (ADVAIR) 250-50 MCG/ACT AEPB diskus inhaler Inhale 1 puff into the lungs in the morning and 1 puff in the evening., Disp-60 each, R-3Normal      aspirin 81 MG EC tablet Take 81 requesting course of steroids and he will continue Tylenol and ibuprofen for the pain. He also has a Flexeril at home. He was given prednisone 20 mg orally and then discharged home on 5 days course. He is advised to follow-up with PCP for further evaluation and possibly MRI scan of the right shoulder as an outpatient, return if worse. Patient was offered referral to orthopedic surgeon but he will call his PCP and get a referral if needed. CONSULTS:  None    PROCEDURES:  None    FINAL IMPRESSION      1. Tendinitis of right rotator cuff          DISPOSITION/PLAN       PATIENT REFERRED TO:  Dain Davila MD  [address]  [phone]    Call in 2 days  For reevaluation of current symptoms    [facility] Emergency Department  [address] [place] [address]  [phone]    If symptoms worsen      DISCHARGE MEDICATIONS:  Discharge Medication List as of 3/23/2023 12:31 AM        START taking these medications    Details   predniSONE (DELTASONE) 20 MG tablet Take 1 tablet by mouth 2 times daily for 5 days, Disp-10 tablet, R-0Normal             (Please note that portions of this note were completed with a voice recognition program.  Efforts were made to edit the dictations but occasionally words are mis-transcribed.)    Lindsey Rojas MD,, MD, F.A.C.E.P.   Attending Emergency Medicine Physician       Lindsey Rojas MD  03/23/23 [id number]

## 2023-03-24 ENCOUNTER — HOSPITAL ENCOUNTER (OUTPATIENT)
Dept: PHYSICAL THERAPY | Facility: CLINIC | Age: 63
Setting detail: THERAPIES SERIES
Discharge: HOME | End: 2023-03-24
Payer: MEDICAID

## 2023-03-24 PROCEDURE — 97016 VASOPNEUMATIC DEVICE THERAPY: CPT

## 2023-03-24 PROCEDURE — 97110 THERAPEUTIC EXERCISES: CPT

## 2023-03-24 NOTE — FLOWSHEET NOTE
benefit from skilled physical therapy services in order to: The patient is a 63year old male with a chief complaint of right shoulder pain and signs and symptoms consistent with a diagnosis of cervical radiculopathy. He presents with pain, weakness, decreased range of motion and functional limitations. He will benefit from skilled PT to address above deficts. Problem list, as detailed above:   [] ? Back Pain:                         [x] ? Cervical Pain:        [x] ? ROM:                     [x] ? Strength:    [x] ? Function:  [x] Postural Deviations  [] Gait Deviations  [] Other:              STG: (to be met in 6 treatments)  ? Pain: 4/10  Patient to be independent with home exercise program as demonstrated by performance with correct form without cues. LTG: (to be met in 16 treatments)  Patient will sleep without limitation. Patient will do all house work without increase in pain. Patient will Patient will drive without limitation from neck or shoulder pain. Patient goals: To reduce pain    Pt. Education:  [x] Yes  [] No  [] Reviewed Prior HEP/Ed  Method of Education: [x] Verbal  [x] Demo  [] Written  Comprehension of Education:  [x] Verbalizes understanding. [x] Demonstrates understanding. [] Needs review. [] Demonstrates/verbalizes HEP/Ed previously given. Plan: [x] Continue current frequency toward long and short term goals.           Time In: 11:00am           Time Out: 11:50am    Electronically signed by:  Jose Luis Bah PTA

## 2023-03-27 ENCOUNTER — TELEPHONE (OUTPATIENT)
Dept: FAMILY MEDICINE CLINIC | Age: 63
End: 2023-03-27

## 2023-03-27 ENCOUNTER — HOSPITAL ENCOUNTER (OUTPATIENT)
Dept: PHYSICAL THERAPY | Facility: CLINIC | Age: 63
Setting detail: THERAPIES SERIES
Discharge: HOME | End: 2023-03-27
Payer: MEDICAID

## 2023-03-27 DIAGNOSIS — M77.8 RIGHT SHOULDER TENDONITIS: Primary | ICD-10-CM

## 2023-03-27 PROCEDURE — 97016 VASOPNEUMATIC DEVICE THERAPY: CPT

## 2023-03-27 PROCEDURE — 97110 THERAPEUTIC EXERCISES: CPT

## 2023-03-27 NOTE — TELEPHONE ENCOUNTER
Patient is requesting a MRI of his right shoulder.  He states that when he went to the ED last they did an xray but it didn't show anything

## 2023-03-27 NOTE — FLOWSHEET NOTE
Manual:       (held)  Prone MFR to upper traps, splenius and posterior cuff  Supine SOR-   Supine manual traction       Patient was informed of the potential benefits of Dry Needling. Patient was informed of risks including but not limited to drowsiness, dizziness, minor bruising or bleeding, temporary pain, tingling, numbness and a low risk of pneumothorax. Patient gave verbal consent to proceed and signed a Dry Needling Acknowledgement form. Dry Needling performed in conjunction with Manual therapy to promote tissue extensibility, improve ROM, and reduce pain. No charge submitted for the time the needle was inserted. 2 right splenius trigger points treated with a 25 mm needle using a bony backdrop for safety. Multiple twitch responses produced. 1 right upper trapezius trigger point treated with a 50 mm needle using a thread techniue for safety. Multiple twitch responses produced. Access Code: [email or web address]  URL: [email or web address]  Date: 03/06/2023  Prepared by: Gerard Roles     Exercises  Left rotation stretch - 4 x daily - 7 x weekly - 1 sets - 1 reps - 30 hold        Specific Instructions for next treatment: Manual therapy PRN, Right foramen opening exercises     Vasocompression: 15' low compression R shoulder seated 34 deg    Treatment Charges: Mins Units   []  Modalities     [x]  Ther Exercise 30 2   []  Manual Therapy     []  Ther Activities     []  Aquatics     [x]  Vasocompression 15 1   []  Other: Dry Needling     Total Treatment time 45 3       Assessment: [x] Progressing toward goals. The patient reports mild pain today in his right shoulder. A myotome screen reveals continued weakness in shoulder abduction (C5). He has some continued shoulder pain with cervical extension. The emphasis of treatment was on progressive shoulder strengthening with good tolerance. Will continue with MT PRN and progressive pain free shoulder and postural strength. [] No change.      []

## 2023-03-27 NOTE — TELEPHONE ENCOUNTER
[facility]) ED Follow up Call    Reason for ED visit:  Shoulder pain     3/27/2023     Tomas GoldsmithOmnique Lin , this is Sarah from Dr. Yonatan Norwood's office, just calling to see how you are doing after your recent ED visit. Did you receive discharge instructions? Yes  Do you understand the discharge instructions? Yes  Did the ED give you any new prescriptions? Yes  Were you able to fill your prescriptions? Yes      Do you have one of our red, yellow and green  Zone sheets that help you to determine when you should go to the ED? Not Applicable      Do you need or want to make a follow up appt with your PCP? No    Do you have any further needs in the home, e.g. equipment?   Not Applicable        FU appts/Provider:    Future Appointments   Date Time Provider Modesto Sanchez   3/30/2023 11:00 AM STA PULM FUNCTION RM STAZ PFT Tony Clutter   3/30/2023  2:30 PM Bridget Coast, PT [facility] TA PT [facility]   4/3/2023  9:00 AM STV MAUMEE MRI RM 1 MHPB MA MRI STV [place] R   4/3/2023 12:00 PM Bridget Coast, PT ST[facility] TA PT [facility]   4/6/2023  1:00 PM Bridget Coast, PT [facility] TA PT [facility]   4/17/2023  2:15 PM Gerri Moreno MD Resp [facility]   9/1/2023 11:15 AM Brian Guerrero MD [facility] [address] [facility]

## 2023-03-30 ENCOUNTER — HOSPITAL ENCOUNTER (OUTPATIENT)
Dept: PULMONOLOGY | Age: 63
Discharge: HOME | End: 2023-03-30
Payer: MEDICAID

## 2023-03-30 ENCOUNTER — HOSPITAL ENCOUNTER (OUTPATIENT)
Dept: PHYSICAL THERAPY | Facility: CLINIC | Age: 63
Setting detail: THERAPIES SERIES
Discharge: HOME | End: 2023-03-30
Payer: MEDICAID

## 2023-03-30 DIAGNOSIS — J44.9 STAGE 1 MILD COPD BY GOLD CLASSIFICATION (HCC): ICD-10-CM

## 2023-03-30 DIAGNOSIS — E55.9 VITAMIN D DEFICIENCY: ICD-10-CM

## 2023-03-30 LAB
DLCO %PRED: NORMAL
DLCO PRED: NORMAL
DLCO/VA %PRED: NORMAL
DLCO/VA PRED: NORMAL
DLCO/VA: NORMAL
DLCO: NORMAL
EXPIRATORY TIME-POST: NORMAL
EXPIRATORY TIME: NORMAL
FEF 25-75% %CHNG: NORMAL
FEF 25-75% %PRED-POST: NORMAL
FEF 25-75% %PRED-PRE: NORMAL
FEF 25-75% PRED: NORMAL
FEF 25-75%-POST: NORMAL
FEF 25-75%-PRE: NORMAL
FEV1 %PRED-POST: NORMAL
FEV1 %PRED-PRE: NORMAL
FEV1 PRED: NORMAL
FEV1-POST: NORMAL
FEV1-PRE: NORMAL
FEV1/FVC %PRED-POST: NORMAL
FEV1/FVC %PRED-PRE: NORMAL
FEV1/FVC PRED: NORMAL
FEV1/FVC-POST: NORMAL
FEV1/FVC-PRE: NORMAL
FVC %PRED-POST: NORMAL
FVC %PRED-PRE: NORMAL
FVC PRED: NORMAL
FVC-POST: NORMAL
FVC-PRE: NORMAL
GAW %PRED: NORMAL
GAW PRED: NORMAL
GAW: NORMAL
IC %PRED: NORMAL
IC PRED: NORMAL
IC: NORMAL
MEP: NORMAL
MIP: NORMAL
MVV %PRED-PRE: NORMAL
MVV PRED: NORMAL
MVV-PRE: NORMAL
PEF %PRED-POST: NORMAL
PEF %PRED-PRE: NORMAL
PEF PRED: NORMAL
PEF%CHNG: NORMAL
PEF-POST: NORMAL
PEF-PRE: NORMAL
RAW %PRED: NORMAL
RAW PRED: NORMAL
RAW: NORMAL
RV %PRED: NORMAL
RV PRED: NORMAL
RV: NORMAL
SVC %PRED: NORMAL
SVC PRED: NORMAL
SVC: NORMAL
TLC %PRED: NORMAL
TLC PRED: NORMAL
TLC: NORMAL
VA %PRED: NORMAL
VA PRED: NORMAL
VA: NORMAL
VTG %PRED: NORMAL
VTG PRED: NORMAL
VTG: NORMAL

## 2023-03-30 PROCEDURE — 94729 DIFFUSING CAPACITY: CPT

## 2023-03-30 PROCEDURE — 6370000000 HC RX 637 (ALT 250 FOR IP): Performed by: INTERNAL MEDICINE

## 2023-03-30 PROCEDURE — 97140 MANUAL THERAPY 1/> REGIONS: CPT

## 2023-03-30 PROCEDURE — 94060 EVALUATION OF WHEEZING: CPT

## 2023-03-30 PROCEDURE — 94726 PLETHYSMOGRAPHY LUNG VOLUMES: CPT

## 2023-03-30 PROCEDURE — 97110 THERAPEUTIC EXERCISES: CPT

## 2023-03-30 RX ORDER — ALBUTEROL SULFATE 90 UG/1
2 AEROSOL, METERED RESPIRATORY (INHALATION) ONCE
Status: COMPLETED | OUTPATIENT
Start: 2023-03-30 | End: 2023-03-30

## 2023-03-30 RX ADMIN — ALBUTEROL SULFATE 2 PUFF: 90 AEROSOL, METERED RESPIRATORY (INHALATION) at 11:35

## 2023-03-30 NOTE — FLOWSHEET NOTE
pain, weakness, decreased range of motion and functional limitations. He will benefit from skilled PT to address above deficts. Problem list, as detailed above:   [] ? Back Pain:                         [x] ? Cervical Pain:        [x] ? ROM:                     [x] ? Strength:    [x] ? Function:  [x] Postural Deviations  [] Gait Deviations  [] Other:              STG: (to be met in 6 treatments)  ? Pain: 4/10  Patient to be independent with home exercise program as demonstrated by performance with correct form without cues. LTG: (to be met in 16 treatments)  Patient will sleep without limitation. Patient will do all house work without increase in pain. Patient will Patient will drive without limitation from neck or shoulder pain. Patient goals: To reduce pain    Pt. Education:  [x] Yes  [] No  [] Reviewed Prior HEP/Ed  Method of Education: [x] Verbal  [x] Demo  [] Written  Comprehension of Education:  [x] Verbalizes understanding. [x] Demonstrates understanding. [] Needs review. [] Demonstrates/verbalizes HEP/Ed previously given. Plan: [x] Continue current frequency toward long and short term goals.           Time In: 2:30 pm          Time Out: 3:15 pm    Electronically signed by:  Albin Arnold PT

## 2023-03-31 RX ORDER — ERGOCALCIFEROL 1.25 MG/1
CAPSULE ORAL
Qty: 4 CAPSULE | Refills: 2 | Status: SHIPPED | OUTPATIENT
Start: 2023-03-31

## 2023-03-31 NOTE — TELEPHONE ENCOUNTER
Evens Renteria is calling to request a refill on the following medication(s):    Medication Request:  Requested Prescriptions     Pending Prescriptions Disp Refills    vitamin D (ERGOCALCIFEROL) 1.25 MG (84407 UT) CAPS capsule [Pharmacy Med Name: VIT D2 (ERGOCAL) 1.25MG(50,000U) CP] 4 capsule 2     Sig: TAKE ONE CAPSULE BY MOUTH ONCE WEEKLY       Last Visit Date (If Applicable):  9/8/9193    Next Visit Date:    9/1/2023

## 2023-04-03 ENCOUNTER — HOSPITAL ENCOUNTER (OUTPATIENT)
Dept: PHYSICAL THERAPY | Facility: CLINIC | Age: 63
Setting detail: THERAPIES SERIES
Discharge: HOME | End: 2023-04-03
Payer: MEDICAID

## 2023-04-03 ENCOUNTER — HOSPITAL ENCOUNTER (OUTPATIENT)
Dept: MRI IMAGING | Age: 63
Discharge: HOME | End: 2023-04-05
Payer: MEDICAID

## 2023-04-03 DIAGNOSIS — M24.111 DEGENERATIVE TEAR OF GLENOID LABRUM OF RIGHT SHOULDER: Primary | ICD-10-CM

## 2023-04-03 DIAGNOSIS — M77.8 RIGHT SHOULDER TENDONITIS: ICD-10-CM

## 2023-04-03 PROCEDURE — 97110 THERAPEUTIC EXERCISES: CPT

## 2023-04-03 PROCEDURE — 73221 MRI JOINT UPR EXTREM W/O DYE: CPT

## 2023-04-03 PROCEDURE — 97140 MANUAL THERAPY 1/> REGIONS: CPT

## 2023-04-03 NOTE — FLOWSHEET NOTE
3x15 lime  x   Scapula Rows  3x12 Green SC   x   Shld ext  2x10 Orange      Shoulder flex 2x10   x   Shoulder scaption 2x10   x   Shoulder 2x10  Increased shoulder hike  x          Other:      Manual:   Side lying MFR to upper traps, splenius and posterior cuff  Supine SOR-   Supine manual traction       (Held)     Dry Needling performed in conjunction with Manual therapy to promote tissue extensibility, improve ROM, and reduce pain. No charge submitted for the time the needle was inserted. 2 right splenius trigger points treated with a 25 mm needle using a bony backdrop for safety. Multiple twitch responses produced. 1 right upper trapezius trigger point treated with a 50 mm needle using a thread techniue for safety. Multiple twitch responses produced. MRI results 4/03/23:   1. Shallow bursal surface tear of posterior supraspinatus near the footplate. Mild underlying supraspinatus and infraspinatus tendinosis. 2. Mild diffuse labral degeneration. Tearing along the posterior labrum and   tiny foci of paralabral cyst formation measuring up to 3 mm. 3. Mild glenohumeral chondromalacia. 4. Mild degenerative change of the right AC joint. Access Code: [email or web address]  URL: [email or web address]  Date: 03/06/2023  Prepared by: Robyn Saini     Exercises  Left rotation stretch - 4 x daily - 7 x weekly - 1 sets - 1 reps - 30 hold        Specific Instructions for next treatment: Manual therapy PRN, Right foramen opening exercises     Vasocompression: 15' low compression R shoulder seated 34 deg    Treatment Charges: Mins Units   []  Modalities     [x]  Ther Exercise 25 2   [x]  Manual Therapy 15 1   []  Ther Activities     []  Aquatics     []  Vasocompression     []  Other: Dry Needling     Total Treatment time 40 3       Assessment: [x] Progressing toward goals. The patient reports improving pain. He has mild pain which did not increase with exercises.   He has continued weakness in shoulder

## 2023-04-04 ENCOUNTER — TELEPHONE (OUTPATIENT)
Dept: FAMILY MEDICINE CLINIC | Age: 63
End: 2023-04-04

## 2023-04-04 NOTE — TELEPHONE ENCOUNTER
Patient is requesting a referral to someone who can give him steroid injections in his right shoulder.

## 2023-04-06 ENCOUNTER — HOSPITAL ENCOUNTER (OUTPATIENT)
Dept: PHYSICAL THERAPY | Facility: CLINIC | Age: 63
Setting detail: THERAPIES SERIES
Discharge: HOME | End: 2023-04-06
Payer: MEDICAID

## 2023-04-06 PROCEDURE — 97140 MANUAL THERAPY 1/> REGIONS: CPT

## 2023-04-06 PROCEDURE — 97110 THERAPEUTIC EXERCISES: CPT

## 2023-04-06 NOTE — FLOWSHEET NOTE
shoulder and posture exercise with conitnued weakness in flexion and especially abduction. [] No change. [] Other:    [x] Patient would continue to benefit from skilled physical therapy services in order to: The patient is a 63year old male with a chief complaint of right shoulder pain and signs and symptoms consistent with a diagnosis of cervical radiculopathy. He presents with pain, weakness, decreased range of motion and functional limitations. He will benefit from skilled PT to address above deficts. Problem list, as detailed above:   [] ? Back Pain:                         [x] ? Cervical Pain:        [x] ? ROM:                     [x] ? Strength:    [x] ? Function:  [x] Postural Deviations  [] Gait Deviations  [] Other:              STG: (to be met in 6 treatments)  ? Pain: 4/10  Patient to be independent with home exercise program as demonstrated by performance with correct form without cues. LTG: (to be met in 16 treatments)  Patient will sleep without limitation. Patient will do all house work without increase in pain. Patient will Patient will drive without limitation from neck or shoulder pain. Patient goals: To reduce pain    Pt. Education:  [x] Yes  [] No  [] Reviewed Prior HEP/Ed  Method of Education: [x] Verbal  [x] Demo  [] Written  Comprehension of Education:  [x] Verbalizes understanding. [x] Demonstrates understanding. [] Needs review. [] Demonstrates/verbalizes HEP/Ed previously given. Plan: [x] Continue current frequency toward long and short term goals.           Time In: 1:00 pm          Time Out: 1:45 pm    Electronically signed by:  Moni Rivas PT

## 2023-04-19 ENCOUNTER — OFFICE VISIT (OUTPATIENT)
Dept: ORTHOPEDIC SURGERY | Age: 63
End: 2023-04-19

## 2023-04-19 ENCOUNTER — OFFICE VISIT (OUTPATIENT)
Dept: PULMONOLOGY | Age: 63
End: 2023-04-19

## 2023-04-19 VITALS
OXYGEN SATURATION: 98 % | DIASTOLIC BLOOD PRESSURE: 69 MMHG | HEART RATE: 78 BPM | TEMPERATURE: 97.9 F | BODY MASS INDEX: 25.62 KG/M2 | SYSTOLIC BLOOD PRESSURE: 107 MMHG | WEIGHT: 173 LBS | RESPIRATION RATE: 14 BRPM | HEIGHT: 69 IN

## 2023-04-19 VITALS — WEIGHT: 175 LBS | HEIGHT: 69 IN | BODY MASS INDEX: 25.92 KG/M2 | RESPIRATION RATE: 14 BRPM

## 2023-04-19 DIAGNOSIS — Z87.891 PERSONAL HISTORY OF TOBACCO USE: ICD-10-CM

## 2023-04-19 DIAGNOSIS — M25.511 RIGHT SHOULDER PAIN, UNSPECIFIED CHRONICITY: Primary | ICD-10-CM

## 2023-04-19 DIAGNOSIS — J44.9 STAGE 1 MILD COPD BY GOLD CLASSIFICATION (HCC): Primary | ICD-10-CM

## 2023-04-19 RX ORDER — FLUTICASONE PROPIONATE AND SALMETEROL 500; 50 UG/1; UG/1
1 POWDER RESPIRATORY (INHALATION) EVERY 12 HOURS
Qty: 60 EACH | Refills: 3 | Status: SHIPPED | OUTPATIENT
Start: 2023-04-19

## 2023-04-19 RX ORDER — IPRATROPIUM BROMIDE AND ALBUTEROL SULFATE 2.5; .5 MG/3ML; MG/3ML
1 SOLUTION RESPIRATORY (INHALATION) EVERY 4 HOURS
Qty: 360 ML | Refills: 11 | Status: SHIPPED | OUTPATIENT
Start: 2023-04-19

## 2023-04-20 ENCOUNTER — TELEPHONE (OUTPATIENT)
Dept: PULMONOLOGY | Age: 63
End: 2023-04-20

## 2023-04-20 ENCOUNTER — HOSPITAL ENCOUNTER (OUTPATIENT)
Dept: PHYSICAL THERAPY | Facility: CLINIC | Age: 63
Setting detail: THERAPIES SERIES
Discharge: HOME | End: 2023-04-20
Payer: MEDICAID

## 2023-04-20 PROCEDURE — 97140 MANUAL THERAPY 1/> REGIONS: CPT

## 2023-04-20 PROCEDURE — 97110 THERAPEUTIC EXERCISES: CPT

## 2023-04-20 NOTE — FLOWSHEET NOTE
[] [facility]) - [facility] &  Therapy  [address]  [phone]  F: [phone] [] [address]  [address]   [address]  P: [phone]  F: [phone] [] [address]  Therapy  [address]  P: [phone]  F: [phone] [x] [address]  P: [phone]  F: [phone] [] [address]  [facility]   [address]   [place]: [phone]  F: [phone]      Physical Therapy Daily Treatment Note    Date:  2023  Patient Name:  Josi Galicia    :  1960  MRN: [id number]  Physician: Kathia Molina MD                                    Insurance: [facility], medicaid (30 visits  Medical Diagnosis: M54.12 (ICD-10-CM) - Cervical radiculopathy             Rehab Codes: Right shoulder pain M25.511  Onset Date: 23               Next 's appt.: Pending  Visit# / total visits:     Cancels/No Shows: 0/0    Subjective:    Pain:  [] Yes  [x] No  Location: R shoulder/neck  Pain Rating: (0-10 scale) 0/10 in neck,0/10 in shoulder   Pain altered Tx:  [] No  [] Yes  Action:  Comments: Patient reports that Dr. Connie Marcus felt his symptoms are a result of his cervical radiculopathy not his shoulder. He notes variable neck and shoulder pain. He continues to feel better for several days after PT. He has an evaluation scheduled with Dr. Bulmaro Delgado.     Objective:  Modalities:   Precautions: lung disease, current smoker  Exercises:  Exercise Reps/ Time Weight/ Level Comments    UBE  2' ea  L3 Cues for good posture    Left flexion/SB stretch 3x30\"                    Sidelying          ER 3x10 1#     Abduction 3x10 0#     Flexion 3x10 0#     HA 3x10      Wall slides  3x10  Flexion     Reactive Abd isometric 10x Green tube  --   AAROM via wand  2x10

## 2023-04-22 NOTE — PROGRESS NOTES
Orthopedic Shoulder Encounter Note     Chief complaint: right shoulder pain    HPI: Eric Livingston is a 63 y.o.  right-hand dominant male who presents for evaluation of his right shoulder. He indicates that he felt pain in his right shoulder 2 to 3 days after lifting some heavy cabinets into a dumpster. This was about 2 months ago. He indicates that he was unable to raise his arm initially  but over time his pain and function has gotten quite better. His pain is primarily localized to the superolateral aspect of the shoulder. He has been going to physical therapy and has been using anti-inflammatories as outlined below. Previous treatment:    NSAIDs: Ibuprofen    Physical Therapy: Yes, currently    Injections: None    Surgeries: None    Review of Systems:   Constitutional: Negative for fever, chills, sweats. Pain Score:  10 - Worst pain ever  Neurological: Negative for headache, numbness, or weakness. Musculoskeletal: As noted in HPI     Past Medical History  Lesley Garcia  has a past medical history of COPD (chronic obstructive pulmonary disease) ([address] Gingival and periodontal disease, Hyperlipidemia, Primary osteoarthritis of both hips, Primary osteoarthritis of left hip, and Seasonal allergies. Past Surgical History  Lesley Garcia  has a past surgical history that includes Colonoscopy; Inguinal hernia repair (Right, 09/30/2022); and hernia repair (Right, 9/30/2022). Current Medications  Current Outpatient Medications   Medication Sig Dispense Refill    ipratropium-albuterol (DUONEB) 0.5-2.5 (3) MG/3ML SOLN nebulizer solution Inhale 3 mLs into the lungs every 4 hours 360 mL 11    fluticasone-salmeterol (WIXELA INHUB) 500-50 MCG/ACT AEPB diskus inhaler Inhale 1 puff into the lungs in the morning and 1 puff in the evening.  60 each 3    vitamin D (ERGOCALCIFEROL) 1.25 MG (68349 UT) CAPS capsule TAKE ONE CAPSULE BY MOUTH ONCE WEEKLY 4 capsule 2    tadalafil (CIALIS) 10 MG tablet TAKE ONE TABLET BY MOUTH DAILY

## 2023-04-25 ENCOUNTER — TELEPHONE (OUTPATIENT)
Dept: GASTROENTEROLOGY | Age: 63
End: 2023-04-25

## 2023-04-25 ENCOUNTER — TELEPHONE (OUTPATIENT)
Dept: ORTHOPEDIC SURGERY | Age: 63
End: 2023-04-25

## 2023-04-25 RX ORDER — BISACODYL 5 MG/1
TABLET, DELAYED RELEASE ORAL
Qty: 4 TABLET | Refills: 0 | Status: SHIPPED | OUTPATIENT
Start: 2023-04-25

## 2023-04-25 RX ORDER — POLYETHYLENE GLYCOL 3350 17 G/17G
POWDER, FOR SOLUTION ORAL
Qty: 238 G | Refills: 0 | Status: SHIPPED | OUTPATIENT
Start: 2023-04-25

## 2023-04-25 NOTE — TELEPHONE ENCOUNTER
Colonoscopy/Michael Frazier    PBDILSHAD 5/31/23 @ 9:00am    Verbal instructions given via phone  Written mailed    Miralax/dulcolax    Screening questionnaire completed

## 2023-04-26 NOTE — TELEPHONE ENCOUNTER
Called [place] spoke with [facility]  [place] was received because of a fill too soon. No need to do a PA at this time. P/O Moo. This was filled on 4/24/2023.  Closing out PA

## 2023-04-26 NOTE — TELEPHONE ENCOUNTER
Are you willing to prescribe a tens unit for patient that you evaluated on 4/19/23 for right shoulder pain?

## 2023-04-27 ENCOUNTER — HOSPITAL ENCOUNTER (OUTPATIENT)
Dept: PHYSICAL THERAPY | Facility: CLINIC | Age: 63
Setting detail: THERAPIES SERIES
Discharge: HOME | End: 2023-04-27
Payer: MEDICAID

## 2023-04-27 PROCEDURE — 97110 THERAPEUTIC EXERCISES: CPT

## 2023-04-27 PROCEDURE — 97140 MANUAL THERAPY 1/> REGIONS: CPT

## 2023-04-27 NOTE — FLOWSHEET NOTE
3x12 Red SC   x   Shld ext  2x10 Orange   x   Shoulder flex to 90 3x10   x   Shoulder scaption 2x10   x   Shoulder abduction to 90 2x10  Improving shoulder hike  x          Other:      Manual:   Supine manual traction post exercise 10'    (Held)   Dry Needling performed in conjunction with Manual therapy to promote tissue extensibility, improve ROM, and reduce pain. No charge submitted for the time the needle was inserted. 2 right splenius trigger points treated with a 25 mm needle using a bony backdrop for safety. Multiple twitch responses produced. 1 right upper trapezius trigger point treated with a 50 mm needle using a thread techniue for safety. Multiple twitch responses produced. MRI results 4/03/23:   1. Shallow bursal surface tear of posterior supraspinatus near the footplate. Mild underlying supraspinatus and infraspinatus tendinosis. 2. Mild diffuse labral degeneration. Tearing along the posterior labrum and   tiny foci of paralabral cyst formation measuring up to 3 mm. 3. Mild glenohumeral chondromalacia. 4. Mild degenerative change of the right AC joint. Access Code: [email or web address]  URL: [email or web address]  Date: 03/06/2023  Prepared by: Hilario Fish     Exercises  Left rotation stretch - 4 x daily - 7 x weekly - 1 sets - 1 reps - 30 hold        Specific Instructions for next treatment: Manual therapy PRN, Right foramen opening exercises     Vasocompression: 15' low compression R shoulder seated 34 deg    Treatment Charges: Mins Units   []  Modalities     [x]  Ther Exercise 28 2   [x]  Manual Therapy 10 1   []  Ther Activities     []  Aquatics     []  Vasocompression     []  Other: Dry Needling     Total Treatment time 38 3       Assessment: [x] Progressing toward goals. The patient has improved neck and improving symptoms stability. His shoulder strength is improving. He will continue to work on HEP prior to consultation with Dr. Kalyani Ritter. [] No change.      [] Other:

## 2023-04-27 NOTE — TELEPHONE ENCOUNTER
A script is prepped and in my Dr. Elbert Anderson folder at [facility] for Dr. Elbert Anderson to sign

## 2023-05-02 ENCOUNTER — TELEPHONE (OUTPATIENT)
Dept: ORTHOPEDIC SURGERY | Age: 63
End: 2023-05-02

## 2023-05-11 ENCOUNTER — TELEPHONE (OUTPATIENT)
Dept: FAMILY MEDICINE CLINIC | Age: 63
End: 2023-05-11

## 2023-05-11 ENCOUNTER — OFFICE VISIT (OUTPATIENT)
Dept: FAMILY MEDICINE CLINIC | Age: 63
End: 2023-05-11

## 2023-05-11 ENCOUNTER — OFFICE VISIT (OUTPATIENT)
Dept: ORTHOPEDIC SURGERY | Age: 63
End: 2023-05-11
Payer: MEDICAID

## 2023-05-11 VITALS
SYSTOLIC BLOOD PRESSURE: 138 MMHG | BODY MASS INDEX: 25.48 KG/M2 | HEART RATE: 93 BPM | WEIGHT: 172 LBS | DIASTOLIC BLOOD PRESSURE: 84 MMHG | TEMPERATURE: 98.1 F | HEIGHT: 69 IN | OXYGEN SATURATION: 95 %

## 2023-05-11 VITALS — BODY MASS INDEX: 25.62 KG/M2 | RESPIRATION RATE: 12 BRPM | WEIGHT: 173 LBS | HEIGHT: 69 IN

## 2023-05-11 DIAGNOSIS — M54.2 NECK PAIN: Primary | ICD-10-CM

## 2023-05-11 DIAGNOSIS — R73.03 PREDIABETES: ICD-10-CM

## 2023-05-11 DIAGNOSIS — F43.22 ADJUSTMENT DISORDER WITH ANXIOUS MOOD: Primary | ICD-10-CM

## 2023-05-11 DIAGNOSIS — Z72.0 TOBACCO ABUSE: ICD-10-CM

## 2023-05-11 DIAGNOSIS — E78.00 PURE HYPERCHOLESTEROLEMIA: ICD-10-CM

## 2023-05-11 DIAGNOSIS — M54.12 CERVICAL RADICULOPATHY: ICD-10-CM

## 2023-05-11 DIAGNOSIS — B18.2 CHRONIC HEPATITIS C WITHOUT HEPATIC COMA (HCC): ICD-10-CM

## 2023-05-11 PROCEDURE — 99213 OFFICE O/P EST LOW 20 MIN: CPT | Performed by: ORTHOPAEDIC SURGERY

## 2023-05-11 RX ORDER — DIAZEPAM 2 MG/1
2 TABLET ORAL EVERY 6 HOURS PRN
Qty: 30 TABLET | Refills: 0 | Status: SHIPPED | OUTPATIENT
Start: 2023-05-11 | End: 2023-05-21

## 2023-05-11 RX ORDER — TRAZODONE HYDROCHLORIDE 50 MG/1
50 TABLET ORAL NIGHTLY
Qty: 30 TABLET | Refills: 3 | Status: SHIPPED | OUTPATIENT
Start: 2023-05-11

## 2023-05-11 SDOH — ECONOMIC STABILITY: TRANSPORTATION INSECURITY
IN THE PAST 12 MONTHS, HAS LACK OF TRANSPORTATION KEPT YOU FROM MEETINGS, WORK, OR FROM GETTING THINGS NEEDED FOR DAILY LIVING?: NO

## 2023-05-11 SDOH — ECONOMIC STABILITY: FOOD INSECURITY: WITHIN THE PAST 12 MONTHS, THE FOOD YOU BOUGHT JUST DIDN'T LAST AND YOU DIDN'T HAVE MONEY TO GET MORE.: NEVER TRUE

## 2023-05-11 SDOH — ECONOMIC STABILITY: INCOME INSECURITY: IN THE LAST 12 MONTHS, WAS THERE A TIME WHEN YOU WERE NOT ABLE TO PAY THE MORTGAGE OR RENT ON TIME?: NO

## 2023-05-11 SDOH — ECONOMIC STABILITY: TRANSPORTATION INSECURITY
IN THE PAST 12 MONTHS, HAS THE LACK OF TRANSPORTATION KEPT YOU FROM MEDICAL APPOINTMENTS OR FROM GETTING MEDICATIONS?: NO

## 2023-05-11 SDOH — ECONOMIC STABILITY: FOOD INSECURITY: WITHIN THE PAST 12 MONTHS, YOU WORRIED THAT YOUR FOOD WOULD RUN OUT BEFORE YOU GOT MONEY TO BUY MORE.: NEVER TRUE

## 2023-05-11 ASSESSMENT — PATIENT HEALTH QUESTIONNAIRE - PHQ9
4. FEELING TIRED OR HAVING LITTLE ENERGY: 2
1. LITTLE INTEREST OR PLEASURE IN DOING THINGS: 0
9. THOUGHTS THAT YOU WOULD BE BETTER OFF DEAD, OR OF HURTING YOURSELF: 0
7. TROUBLE CONCENTRATING ON THINGS, SUCH AS READING THE NEWSPAPER OR WATCHING TELEVISION: 2
5. POOR APPETITE OR OVEREATING: 2
SUM OF ALL RESPONSES TO PHQ QUESTIONS 1-9: 10
10. IF YOU CHECKED OFF ANY PROBLEMS, HOW DIFFICULT HAVE THESE PROBLEMS MADE IT FOR YOU TO DO YOUR WORK, TAKE CARE OF THINGS AT HOME, OR GET ALONG WITH OTHER PEOPLE: 2
6. FEELING BAD ABOUT YOURSELF - OR THAT YOU ARE A FAILURE OR HAVE LET YOURSELF OR YOUR FAMILY DOWN: 1
2. FEELING DOWN, DEPRESSED OR HOPELESS: 3
SUM OF ALL RESPONSES TO PHQ9 QUESTIONS 1 & 2: 3
3. TROUBLE FALLING OR STAYING ASLEEP: 0
SUM OF ALL RESPONSES TO PHQ QUESTIONS 1-9: 10
SUM OF ALL RESPONSES TO PHQ QUESTIONS 1-9: 10
8. MOVING OR SPEAKING SO SLOWLY THAT OTHER PEOPLE COULD HAVE NOTICED. OR THE OPPOSITE, BEING SO FIGETY OR RESTLESS THAT YOU HAVE BEEN MOVING AROUND A LOT MORE THAN USUAL: 0
SUM OF ALL RESPONSES TO PHQ QUESTIONS 1-9: 10

## 2023-05-11 ASSESSMENT — SOCIAL DETERMINANTS OF HEALTH (SDOH): HOW HARD IS IT FOR YOU TO PAY FOR THE VERY BASICS LIKE FOOD, HOUSING, MEDICAL CARE, AND HEATING?: NOT HARD AT ALL

## 2023-05-11 NOTE — PROGRESS NOTES
[address] PRIMARY CARE  [address] [place] [address] [address] [facility]  Dept: [phone]  Dept Fax: [phone]    Kristin Plascencia is a 63 y.o. male who is a Established patient, who presents today for his medical conditions/complaints as noted below:  Chief Complaint   Patient presents with    Anxiety         HPI:     He is here today to discuss his anxiety issues. He states that he has been having increased anxiety lately specially dealing with his girlfriend who has multiple chronic issues. He states that he feels like he gets frequent panic attacks and has not been able to sleep well at night lately. He feels that taking care of his girlfriend who lives with her has been taking a toll on his mental health now. He has never had anxiety issues previously. He is also current everyday smoker and has COPD, follows with pulmonary. He is due for his routine labs.       Hemoglobin A1C (%)   Date Value   05/19/2022 6.2 (H)   05/26/2021 6.3 (H)             ( goal A1Cis < 7)   No results found for: LABMICR  LDL Cholesterol (mg/dL)   Date Value   05/19/2022 81   06/18/2021 90   05/21/2021 148 (H)       (goal LDL is <100)   AST (U/L)   Date Value   05/19/2022 18     ALT (U/L)   Date Value   05/19/2022 19     BUN (mg/dL)   Date Value   05/19/2022 15     BP Readings from Last 3 Encounters:   05/11/23 138/84   04/19/23 107/69   03/22/23 127/74          (goal 120/80)    Past Medical History:   Diagnosis Date    COPD (chronic obstructive pulmonary disease) (HCC)     emphysema    Gingival and periodontal disease     chronic    Hyperlipidemia     Primary osteoarthritis of both hips     Primary osteoarthritis of left hip 06/22/2017    Seasonal allergies       Past Surgical History:   Procedure Laterality Date    COLONOSCOPY      HERNIA REPAIR Right 9/30/2022    RIGHT INGUINAL  HERNIA REPAIR LAPAROSCOPIC ROBOTIC WITH MESH performed by Sha Low DO at [facility]

## 2023-05-11 NOTE — TELEPHONE ENCOUNTER
Patient called stating that he is having extreme anxiety and need something to help, nothing is available until 5 16 which is VV . He would like to be seen today or sooner .  He feels like he is going to blow a gasket

## 2023-05-11 NOTE — PROGRESS NOTES
Patient ID: Landen Schaefer is a 63 y.o. male    Chief Compliant:  Chief Complaint   Patient presents with    Neck Pain        Diagnostic imaging:    AP and lateral cervical spine age-appropriate C5-C7 cervical degenerative changes    Assessment and Plan:  1. Neck pain      Posterior right shoulder and neck pain x 10 weeks; substantially improved within the past week    Call if recurs for MRI cervical     Patient has completed a multitude of weeks of physical therapy whether or not this is causes resolution of symptoms or not but his symptoms have resolved    Follow up prn     HPI:  This is a 63 y.o. male who presents to the clinic today as a new patient for evaluation of neck evaluation. Patient with posterior right shoulder and neck pain that occurred after lifting heavy cabinets into a dumpster ~3 months ago. His pain has significantly improved over the past week. He has completed 7 weeks of PT. Review of Systems   All other systems reviewed and are negative.       Past History:    Current Outpatient Medications:     polyethylene glycol (GLYCOLAX) 17 GM/SCOOP powder, Take as directed for bowel prep, Disp: 238 g, Rfl: 0    bisacodyl 5 MG EC tablet, Take as directed for bowel prep, Disp: 4 tablet, Rfl: 0    ipratropium-albuterol (DUONEB) 0.5-2.5 (3) MG/3ML SOLN nebulizer solution, Inhale 3 mLs into the lungs every 4 hours, Disp: 360 mL, Rfl: 11    fluticasone-salmeterol (WIXELA INHUB) 500-50 MCG/ACT AEPB diskus inhaler, Inhale 1 puff into the lungs in the morning and 1 puff in the evening., Disp: 60 each, Rfl: 3    vitamin D (ERGOCALCIFEROL) 1.25 MG (30850 UT) CAPS capsule, TAKE ONE CAPSULE BY MOUTH ONCE WEEKLY, Disp: 4 capsule, Rfl: 2    tadalafil (CIALIS) 10 MG tablet, TAKE ONE TABLET BY MOUTH DAILY AS NEEDED FOR  ERECTILE DYSFUNCTION, Disp: 10 tablet, Rfl: 0    tamsulosin (FLOMAX) 0.4 MG capsule, Take 1 capsule by mouth daily, Disp: 30 capsule, Rfl: 2    nicotine (NICOTINE STEP 2) 14 MG/24HR, APPLY

## 2023-05-14 PROBLEM — F43.22 ADJUSTMENT DISORDER WITH ANXIOUS MOOD: Status: ACTIVE | Noted: 2023-05-14

## 2023-05-14 ASSESSMENT — ENCOUNTER SYMPTOMS
COUGH: 0
ABDOMINAL PAIN: 0
CHEST TIGHTNESS: 0
EYE DISCHARGE: 0
CONSTIPATION: 0
VOMITING: 0
DIARRHEA: 0
WHEEZING: 0
SHORTNESS OF BREATH: 0
NAUSEA: 0
SORE THROAT: 0

## 2023-05-21 PROBLEM — J44.9 STAGE 1 MILD COPD BY GOLD CLASSIFICATION (HCC): Status: ACTIVE | Noted: 2023-05-21

## 2023-05-21 PROBLEM — J43.8 OTHER EMPHYSEMA (HCC): Status: RESOLVED | Noted: 2022-08-10 | Resolved: 2023-05-21

## 2023-05-23 DIAGNOSIS — J43.8 OTHER EMPHYSEMA (HCC): Chronic | ICD-10-CM

## 2023-05-23 DIAGNOSIS — F17.200 CURRENT SMOKER: ICD-10-CM

## 2023-05-23 DIAGNOSIS — E78.00 PURE HYPERCHOLESTEROLEMIA: ICD-10-CM

## 2023-05-23 NOTE — TELEPHONE ENCOUNTER
He would like to know if you can increase the diazapam dosage, he would like to know if you can double it?

## 2023-05-24 DIAGNOSIS — F43.22 ADJUSTMENT DISORDER WITH ANXIOUS MOOD: ICD-10-CM

## 2023-05-24 RX ORDER — ALBUTEROL SULFATE 90 UG/1
2 AEROSOL, METERED RESPIRATORY (INHALATION) 4 TIMES DAILY PRN
Qty: 54 G | Refills: 1 | Status: SHIPPED | OUTPATIENT
Start: 2023-05-24

## 2023-05-24 RX ORDER — ATORVASTATIN CALCIUM 20 MG/1
TABLET, FILM COATED ORAL
Qty: 30 TABLET | Refills: 5 | Status: SHIPPED | OUTPATIENT
Start: 2023-05-24

## 2023-05-24 NOTE — TELEPHONE ENCOUNTER
Ladarius Ferrer is calling to request a refill on the following medication(s):    Medication Request:  Requested Prescriptions     Pending Prescriptions Disp Refills    diazePAM (VALIUM) 2 MG tablet [Pharmacy Med Name: diazePAM 2 MG TABLET] 30 tablet      Sig: TAKE ONE TABLET BY MOUTH EVERY 6 HOURS AS NEEDED FOR ANXIETY FOR UP TO 10 DAYS.  MAX DAILY AMOUNT: 4 TABLETS       Last Visit Date (If Applicable):  1/25/8357    Next Visit Date:    6/19/2023

## 2023-05-25 RX ORDER — DIAZEPAM 2 MG/1
TABLET ORAL
Qty: 30 TABLET | Refills: 0 | Status: SHIPPED | OUTPATIENT
Start: 2023-05-25 | End: 2023-06-24

## 2023-05-26 ENCOUNTER — TELEPHONE (OUTPATIENT)
Dept: FAMILY MEDICINE CLINIC | Age: 63
End: 2023-05-26

## 2023-05-26 DIAGNOSIS — J43.8 OTHER EMPHYSEMA (HCC): Primary | ICD-10-CM

## 2023-05-26 RX ORDER — ALBUTEROL SULFATE 90 UG/1
2 AEROSOL, METERED RESPIRATORY (INHALATION) EVERY 6 HOURS PRN
Qty: 18 G | Refills: 3 | Status: SHIPPED | OUTPATIENT
Start: 2023-05-26

## 2023-06-08 RX ORDER — BISACODYL 5 MG/1
TABLET, DELAYED RELEASE ORAL
Qty: 4 TABLET | Refills: 0 | OUTPATIENT
Start: 2023-06-08

## 2023-06-08 RX ORDER — POLYETHYLENE GLYCOL 3350 17 G/17G
POWDER, FOR SOLUTION ORAL
Qty: 238 G | Refills: 0 | OUTPATIENT
Start: 2023-06-08

## 2023-06-08 RX ORDER — FLUTICASONE PROPIONATE AND SALMETEROL 50; 250 UG/1; UG/1
POWDER RESPIRATORY (INHALATION)
OUTPATIENT
Start: 2023-06-08

## 2023-06-08 NOTE — TELEPHONE ENCOUNTER
RECEIVED a refill request for Advair 250/50. This has been denied because on 4/19/2023 the patient's Advair was increased to 500/50.

## 2023-06-09 ENCOUNTER — HOSPITAL ENCOUNTER (OUTPATIENT)
Age: 63
Setting detail: SPECIMEN
Discharge: HOME | End: 2023-06-09

## 2023-06-09 DIAGNOSIS — B18.2 CHRONIC HEPATITIS C WITHOUT HEPATIC COMA (HCC): ICD-10-CM

## 2023-06-09 DIAGNOSIS — E78.00 PURE HYPERCHOLESTEROLEMIA: ICD-10-CM

## 2023-06-09 DIAGNOSIS — R73.03 PREDIABETES: ICD-10-CM

## 2023-06-09 LAB
ALBUMIN SERPL-MCNC: 3.9 G/DL (ref 3.5–5.2)
ALBUMIN/GLOB SERPL: 1.6 {RATIO} (ref 1–2.5)
ALP SERPL-CCNC: 49 U/L (ref 40–129)
ALT SERPL-CCNC: 15 U/L (ref 5–41)
ANION GAP SERPL CALCULATED.3IONS-SCNC: 11 MMOL/L (ref 9–17)
AST SERPL-CCNC: 19 U/L
BASOPHILS # BLD: 0.08 K/UL (ref 0–0.2)
BASOPHILS NFR BLD: 1 % (ref 0–2)
BILIRUB SERPL-MCNC: 0.4 MG/DL (ref 0.3–1.2)
BUN SERPL-MCNC: 10 MG/DL (ref 8–23)
CALCIUM SERPL-MCNC: 9.1 MG/DL (ref 8.6–10.4)
CHLORIDE SERPL-SCNC: 108 MMOL/L (ref 98–107)
CHOLEST SERPL-MCNC: 133 MG/DL
CHOLESTEROL/HDL RATIO: 2.3
CO2 SERPL-SCNC: 21 MMOL/L (ref 20–31)
CREAT SERPL-MCNC: 0.84 MG/DL (ref 0.7–1.2)
EOSINOPHIL # BLD: 0.27 K/UL (ref 0–0.44)
EOSINOPHILS RELATIVE PERCENT: 3 % (ref 1–4)
ERYTHROCYTE [DISTWIDTH] IN BLOOD BY AUTOMATED COUNT: 15.1 % (ref 11.8–14.4)
EST. AVERAGE GLUCOSE BLD GHB EST-MCNC: 117 MG/DL
GFR SERPL CREATININE-BSD FRML MDRD: >60 ML/MIN/1.73M2
GLUCOSE P FAST SERPL-MCNC: 82 MG/DL (ref 70–99)
HBA1C MFR BLD: 5.7 % (ref 4–6)
HCT VFR BLD AUTO: 46.7 % (ref 40.7–50.3)
HDLC SERPL-MCNC: 58 MG/DL
HGB BLD-MCNC: 14.9 G/DL (ref 13–17)
IMM GRANULOCYTES # BLD AUTO: 0.03 K/UL (ref 0–0.3)
IMM GRANULOCYTES NFR BLD: 0 %
LDLC SERPL CALC-MCNC: 63 MG/DL (ref 0–130)
LYMPHOCYTES # BLD: 20 % (ref 24–43)
LYMPHOCYTES NFR BLD: 2.02 K/UL (ref 1.1–3.7)
MCH RBC QN AUTO: 29.5 PG (ref 25.2–33.5)
MCHC RBC AUTO-ENTMCNC: 31.9 G/DL (ref 28.4–34.8)
MCV RBC AUTO: 92.5 FL (ref 82.6–102.9)
MONOCYTES NFR BLD: 0.91 K/UL (ref 0.1–1.2)
MONOCYTES NFR BLD: 9 % (ref 3–12)
NEUTROPHILS NFR BLD: 67 % (ref 36–65)
NEUTS SEG NFR BLD: 6.83 K/UL (ref 1.5–8.1)
NRBC AUTOMATED: 0 PER 100 WBC
PLATELET # BLD AUTO: 335 K/UL (ref 138–453)
PMV BLD AUTO: 10.3 FL (ref 8.1–13.5)
POTASSIUM SERPL-SCNC: 4.2 MMOL/L (ref 3.7–5.3)
PROT SERPL-MCNC: 6.3 G/DL (ref 6.4–8.3)
PSA SERPL-MCNC: 5.05 NG/ML
RBC # BLD AUTO: 5.05 M/UL (ref 4.21–5.77)
RBC # BLD: ABNORMAL 10*6/UL
SODIUM SERPL-SCNC: 140 MMOL/L (ref 135–144)
TRIGL SERPL-MCNC: 59 MG/DL
TSH SERPL-MCNC: 0.93 UIU/ML (ref 0.3–5)
WBC OTHER # BLD: 10.1 K/UL (ref 3.5–11.3)

## 2023-06-19 ENCOUNTER — OFFICE VISIT (OUTPATIENT)
Dept: FAMILY MEDICINE CLINIC | Age: 63
End: 2023-06-19
Payer: MEDICAID

## 2023-06-19 VITALS
SYSTOLIC BLOOD PRESSURE: 112 MMHG | BODY MASS INDEX: 24.88 KG/M2 | DIASTOLIC BLOOD PRESSURE: 60 MMHG | HEIGHT: 69 IN | HEART RATE: 88 BPM | TEMPERATURE: 97.9 F | WEIGHT: 168 LBS | OXYGEN SATURATION: 96 %

## 2023-06-19 DIAGNOSIS — E78.00 PURE HYPERCHOLESTEROLEMIA: ICD-10-CM

## 2023-06-19 DIAGNOSIS — R73.03 PREDIABETES: ICD-10-CM

## 2023-06-19 DIAGNOSIS — F43.22 ADJUSTMENT DISORDER WITH ANXIOUS MOOD: Primary | ICD-10-CM

## 2023-06-19 DIAGNOSIS — Z12.11 SCREEN FOR COLON CANCER: ICD-10-CM

## 2023-06-19 DIAGNOSIS — Z72.0 TOBACCO ABUSE: ICD-10-CM

## 2023-06-19 PROCEDURE — 99214 OFFICE O/P EST MOD 30 MIN: CPT | Performed by: STUDENT IN AN ORGANIZED HEALTH CARE EDUCATION/TRAINING PROGRAM

## 2023-06-19 PROCEDURE — 99406 BEHAV CHNG SMOKING 3-10 MIN: CPT | Performed by: STUDENT IN AN ORGANIZED HEALTH CARE EDUCATION/TRAINING PROGRAM

## 2023-06-19 SDOH — ECONOMIC STABILITY: FOOD INSECURITY: WITHIN THE PAST 12 MONTHS, YOU WORRIED THAT YOUR FOOD WOULD RUN OUT BEFORE YOU GOT MONEY TO BUY MORE.: NEVER TRUE

## 2023-06-19 SDOH — ECONOMIC STABILITY: INCOME INSECURITY: IN THE LAST 12 MONTHS, WAS THERE A TIME WHEN YOU WERE NOT ABLE TO PAY THE MORTGAGE OR RENT ON TIME?: NO

## 2023-06-19 SDOH — ECONOMIC STABILITY: FOOD INSECURITY: WITHIN THE PAST 12 MONTHS, THE FOOD YOU BOUGHT JUST DIDN'T LAST AND YOU DIDN'T HAVE MONEY TO GET MORE.: NEVER TRUE

## 2023-06-19 ASSESSMENT — PATIENT HEALTH QUESTIONNAIRE - PHQ9
2. FEELING DOWN, DEPRESSED OR HOPELESS: 0
SUM OF ALL RESPONSES TO PHQ9 QUESTIONS 1 & 2: 0
SUM OF ALL RESPONSES TO PHQ QUESTIONS 1-9: 0
9. THOUGHTS THAT YOU WOULD BE BETTER OFF DEAD, OR OF HURTING YOURSELF: 0
1. LITTLE INTEREST OR PLEASURE IN DOING THINGS: 0
10. IF YOU CHECKED OFF ANY PROBLEMS, HOW DIFFICULT HAVE THESE PROBLEMS MADE IT FOR YOU TO DO YOUR WORK, TAKE CARE OF THINGS AT HOME, OR GET ALONG WITH OTHER PEOPLE: 0
SUM OF ALL RESPONSES TO PHQ QUESTIONS 1-9: 0
8. MOVING OR SPEAKING SO SLOWLY THAT OTHER PEOPLE COULD HAVE NOTICED. OR THE OPPOSITE, BEING SO FIGETY OR RESTLESS THAT YOU HAVE BEEN MOVING AROUND A LOT MORE THAN USUAL: 0
SUM OF ALL RESPONSES TO PHQ QUESTIONS 1-9: 0
4. FEELING TIRED OR HAVING LITTLE ENERGY: 0
SUM OF ALL RESPONSES TO PHQ QUESTIONS 1-9: 0
6. FEELING BAD ABOUT YOURSELF - OR THAT YOU ARE A FAILURE OR HAVE LET YOURSELF OR YOUR FAMILY DOWN: 0
3. TROUBLE FALLING OR STAYING ASLEEP: 0
7. TROUBLE CONCENTRATING ON THINGS, SUCH AS READING THE NEWSPAPER OR WATCHING TELEVISION: 0
5. POOR APPETITE OR OVEREATING: 0

## 2023-06-19 ASSESSMENT — ENCOUNTER SYMPTOMS
SORE THROAT: 0
EYE DISCHARGE: 0
CHEST TIGHTNESS: 0
CONSTIPATION: 0
DIARRHEA: 0
ABDOMINAL PAIN: 0
VOMITING: 0
NAUSEA: 0
COUGH: 0
WHEEZING: 0
SHORTNESS OF BREATH: 0

## 2023-06-19 ASSESSMENT — SOCIAL DETERMINANTS OF HEALTH (SDOH): HOW HARD IS IT FOR YOU TO PAY FOR THE VERY BASICS LIKE FOOD, HOUSING, MEDICAL CARE, AND HEATING?: NOT HARD AT ALL

## 2023-06-19 NOTE — PROGRESS NOTES
[address] [facility]  [address] [place] [address] [address] [place] [address]  Dept: [phone]  Dept Fax: [phone]    Jelly Kendall is a 66 y.o. male who is a Established patient, who presents today for his medical conditions/complaints as noted below:  Chief Complaint   Patient presents with    Depression    Anxiety         HPI:     He is here today to follow-up on anxiety and depression and to discuss recent labs. He states that trazodone at nighttime has been helping with his sleep and he feels much better now. He takes Valium as needed and it has been working well for him. He states that his circumstances have also improved at home which is helping with his mood and anxiety. He admits to smoking more recently when he was under more stress but has been trying to cut down. He has been losing weight lately and is overdue for his colonoscopy, agrees to schedule. His labs were normal including his A1c and lipids. His recent PSA levels were  elevated and he is scheduled to follow-up with urology.         Hemoglobin A1C (%)   Date Value   06/09/2023 5.7   05/19/2022 6.2 (H)   05/26/2021 6.3 (H)             ( goal A1Cis < 7)   No results found for: LABMICR  LDL Cholesterol (mg/dL)   Date Value   06/09/2023 63   05/19/2022 81   06/18/2021 90       (goal LDL is <100)   AST (U/L)   Date Value   06/09/2023 19     ALT (U/L)   Date Value   06/09/2023 15     BUN (mg/dL)   Date Value   06/09/2023 10     BP Readings from Last 3 Encounters:   06/19/23 112/60   05/11/23 138/84   04/19/23 107/69          (goal 120/80)    Past Medical History:   Diagnosis Date    COPD (chronic obstructive pulmonary disease) (HCC)     emphysema    Gingival and periodontal disease     chronic    Hyperlipidemia     Primary osteoarthritis of both hips     Primary osteoarthritis of left hip 06/22/2017    Seasonal allergies       Past Surgical History:   Procedure Laterality Date    COLONOSCOPY      HERNIA REPAIR Right

## 2023-06-22 DIAGNOSIS — F43.22 ADJUSTMENT DISORDER WITH ANXIOUS MOOD: ICD-10-CM

## 2023-06-22 RX ORDER — DIAZEPAM 5 MG/1
TABLET ORAL
Qty: 30 TABLET | Refills: 0 | Status: SHIPPED | OUTPATIENT
Start: 2023-06-22 | End: 2023-07-10 | Stop reason: SDUPTHER

## 2023-06-29 DIAGNOSIS — E55.9 VITAMIN D DEFICIENCY: ICD-10-CM

## 2023-06-29 RX ORDER — ERGOCALCIFEROL 1.25 MG/1
CAPSULE ORAL
Qty: 4 CAPSULE | Refills: 2 | Status: SHIPPED | OUTPATIENT
Start: 2023-06-29

## 2023-07-06 DIAGNOSIS — N52.9 ERECTILE DYSFUNCTION, UNSPECIFIED ERECTILE DYSFUNCTION TYPE: ICD-10-CM

## 2023-07-06 DIAGNOSIS — F43.22 ADJUSTMENT DISORDER WITH ANXIOUS MOOD: ICD-10-CM

## 2023-07-07 DIAGNOSIS — F43.22 ADJUSTMENT DISORDER WITH ANXIOUS MOOD: ICD-10-CM

## 2023-07-10 RX ORDER — DIAZEPAM 5 MG/1
TABLET ORAL
Qty: 30 TABLET | OUTPATIENT
Start: 2023-07-10

## 2023-07-10 RX ORDER — DIAZEPAM 5 MG/1
5 TABLET ORAL NIGHTLY PRN
Qty: 30 TABLET | Refills: 0 | Status: SHIPPED | OUTPATIENT
Start: 2023-07-10 | End: 2023-08-09

## 2023-07-10 RX ORDER — TADALAFIL 10 MG/1
10 TABLET ORAL DAILY PRN
Qty: 10 TABLET | Refills: 0 | Status: SHIPPED | OUTPATIENT
Start: 2023-07-10

## 2023-07-26 DIAGNOSIS — F43.22 ADJUSTMENT DISORDER WITH ANXIOUS MOOD: ICD-10-CM

## 2023-07-26 NOTE — TELEPHONE ENCOUNTER
Requested Prescriptions     Pending Prescriptions Disp Refills    diazePAM (VALIUM) 5 MG tablet 30 tablet 0     Sig: Take 1 tablet by mouth nightly as needed for Anxiety or Sleep for up to 30 days.  Max Daily Amount: 5 mg

## 2023-07-27 ENCOUNTER — TELEPHONE (OUTPATIENT)
Dept: FAMILY MEDICINE CLINIC | Age: 63
End: 2023-07-27

## 2023-07-27 NOTE — TELEPHONE ENCOUNTER
Patient would like to know if you would put him back on the 2 mg  of valium  every 4-6 hours instead of  5 mg once a day once  a day ?

## 2023-07-31 NOTE — TELEPHONE ENCOUNTER
Patient stated that his anxiety is very high and has taken xanax in the past, does he need  to be seen or you can prescribe this medication ?

## 2023-08-03 DIAGNOSIS — F43.22 ADJUSTMENT DISORDER WITH ANXIOUS MOOD: Primary | ICD-10-CM

## 2023-08-03 RX ORDER — DIAZEPAM 5 MG/1
5 TABLET ORAL NIGHTLY PRN
Qty: 30 TABLET | Refills: 0 | OUTPATIENT
Start: 2023-08-03 | End: 2023-09-02

## 2023-08-03 RX ORDER — DIAZEPAM 2 MG/1
2 TABLET ORAL EVERY 12 HOURS PRN
Qty: 60 TABLET | Refills: 1 | Status: SHIPPED | OUTPATIENT
Start: 2023-08-03 | End: 2023-10-02

## 2023-09-05 DIAGNOSIS — F43.22 ADJUSTMENT DISORDER WITH ANXIOUS MOOD: ICD-10-CM

## 2023-09-06 NOTE — TELEPHONE ENCOUNTER
[facility] the following medication(s):    Medication Request:  Requested Prescriptions     Pending Prescriptions Disp Refills    diazePAM (VALIUM) 5 MG tablet [Pharmacy Med Name: diazePAM 5 MG TABLET] 30 tablet      Sig: TAKE ONE TABLET BY MOUTH ONCE NIGHTLY AS NEEDED FOR ANXIETY OR SLEEP       Last Visit Date (If Applicable):  6/12/0357    Next Visit Date:    12/19/2023

## 2023-09-07 DIAGNOSIS — F43.22 ADJUSTMENT DISORDER WITH ANXIOUS MOOD: Primary | ICD-10-CM

## 2023-09-07 RX ORDER — DIAZEPAM 5 MG/1
TABLET ORAL
Qty: 30 TABLET | OUTPATIENT
Start: 2023-09-07

## 2023-09-07 RX ORDER — DIAZEPAM 2 MG/1
2 TABLET ORAL EVERY 12 HOURS PRN
Qty: 60 TABLET | Refills: 1 | Status: SHIPPED | OUTPATIENT
Start: 2023-09-07 | End: 2023-11-06

## 2023-09-08 DIAGNOSIS — F43.22 ADJUSTMENT DISORDER WITH ANXIOUS MOOD: ICD-10-CM

## 2023-09-08 RX ORDER — TRAZODONE HYDROCHLORIDE 50 MG/1
TABLET ORAL
Qty: 90 TABLET | Refills: 1 | Status: SHIPPED | OUTPATIENT
Start: 2023-09-08

## 2023-09-08 NOTE — TELEPHONE ENCOUNTER
Cony Dodd is calling to request a refill on the following medication(s):    Medication Request:  Requested Prescriptions     Pending Prescriptions Disp Refills    traZODone (DESYREL) 50 MG tablet [Pharmacy Med Name: traZODone 50 MG TABLET] 90 tablet      Sig: TAKE ONE TABLET BY MOUTH ONCE NIGHTLY       Last Visit Date (If Applicable):  9/28/0612    Next Visit Date:    12/19/2023

## 2023-10-02 DIAGNOSIS — J44.9 STAGE 1 MILD COPD BY GOLD CLASSIFICATION (HCC): ICD-10-CM

## 2023-10-02 DIAGNOSIS — E55.9 VITAMIN D DEFICIENCY: ICD-10-CM

## 2023-10-03 RX ORDER — ALBUTEROL SULFATE 2.5 MG/3ML
SOLUTION RESPIRATORY (INHALATION)
Qty: 75 ML | Refills: 1 | Status: SHIPPED | OUTPATIENT
Start: 2023-10-03

## 2023-10-03 RX ORDER — ERGOCALCIFEROL 1.25 MG/1
CAPSULE ORAL
Qty: 12 CAPSULE | Refills: 1 | Status: SHIPPED | OUTPATIENT
Start: 2023-10-03

## 2023-10-03 NOTE — TELEPHONE ENCOUNTER
LAST VISIT: 4/19/23  No follow up scheduled. Writer called patient on mobile number and left a message on machine asking for a call back to schedule an appointment. Office phone number was provided. Per last dictation patient is on this medication. Please sign for refill if ok. Thank you.

## 2023-10-09 RX ORDER — FLUTICASONE PROPIONATE AND SALMETEROL 50; 500 UG/1; UG/1
POWDER RESPIRATORY (INHALATION)
Qty: 1 EACH | Refills: 3 | Status: SHIPPED | OUTPATIENT
Start: 2023-10-09

## 2023-11-08 DIAGNOSIS — E78.00 PURE HYPERCHOLESTEROLEMIA: ICD-10-CM

## 2023-11-08 DIAGNOSIS — F43.22 ADJUSTMENT DISORDER WITH ANXIOUS MOOD: ICD-10-CM

## 2023-11-08 RX ORDER — TRAZODONE HYDROCHLORIDE 50 MG/1
50 TABLET ORAL NIGHTLY
Qty: 90 TABLET | Refills: 1 | Status: SHIPPED | OUTPATIENT
Start: 2023-11-08

## 2023-11-08 RX ORDER — ATORVASTATIN CALCIUM 20 MG/1
TABLET, FILM COATED ORAL
Qty: 30 TABLET | Refills: 5 | Status: SHIPPED | OUTPATIENT
Start: 2023-11-08

## 2023-11-08 NOTE — TELEPHONE ENCOUNTER
Floridalma Hanna is calling to request a refill on the following medication(s):    Medication Request:  Requested Prescriptions     Pending Prescriptions Disp Refills    atorvastatin (LIPITOR) 20 MG tablet 30 tablet 5     Sig: TAKE ONE TABLET BY MOUTH DAILY    traZODone (DESYREL) 50 MG tablet 90 tablet 1     Sig: Take 1 tablet by mouth nightly       Last Visit Date (If Applicable):  6/29/0275    Next Visit Date:    12/19/2023

## 2023-11-10 ENCOUNTER — TELEPHONE (OUTPATIENT)
Dept: FAMILY MEDICINE CLINIC | Age: 63
End: 2023-11-10

## 2023-11-13 DIAGNOSIS — E44.1 MILD PROTEIN MALNUTRITION (HCC): Primary | ICD-10-CM

## 2023-11-13 RX ORDER — LACTOSE-REDUCED FOOD 0.06G-1/ML
LIQUID (ML) ORAL
Qty: 90 EACH | Refills: 0 | Status: SHIPPED | OUTPATIENT
Start: 2023-11-13 | End: 2023-11-15 | Stop reason: SDUPTHER

## 2023-11-15 ENCOUNTER — TELEPHONE (OUTPATIENT)
Dept: FAMILY MEDICINE CLINIC | Age: 63
End: 2023-11-15

## 2023-11-15 ENCOUNTER — PATIENT MESSAGE (OUTPATIENT)
Dept: FAMILY MEDICINE CLINIC | Age: 63
End: 2023-11-15

## 2023-11-15 DIAGNOSIS — E44.1 MILD PROTEIN MALNUTRITION (HCC): ICD-10-CM

## 2023-11-15 DIAGNOSIS — N52.9 ERECTILE DYSFUNCTION, UNSPECIFIED ERECTILE DYSFUNCTION TYPE: Primary | ICD-10-CM

## 2023-11-15 RX ORDER — LACTOSE-REDUCED FOOD 0.06G-1/ML
LIQUID (ML) ORAL
Qty: 90 EACH | Refills: 0 | Status: SHIPPED | OUTPATIENT
Start: 2023-11-15

## 2023-11-15 RX ORDER — TADALAFIL 20 MG/1
20 TABLET ORAL DAILY PRN
Qty: 10 TABLET | Refills: 1 | Status: SHIPPED | OUTPATIENT
Start: 2023-11-15

## 2023-11-15 NOTE — TELEPHONE ENCOUNTER
From: Rigoberto Osorio  To: Dr. Brian Bullard: 11/15/2023 1:26 PM EST  Subject: Tadalafil. .. Hey Dr. Rodger Cohn I hope all is well. One of your employees is telling me to go to my urologist for my Tadalafil, he gave me 20mg. and she said no 10 then in a very rude way told me to go to him. I do not understand why I have such a hard time with your people, is it a male female issue? a black white issue? not making any sense, you have no problem giving an amphetamine addict 90 mg. of Adderall that has destroyed her life, but i need something to help me urinate and I'am a bad jesse. I will let you listen to a recording of Henna Penaloza and you can then  her character, she is very suicidal I can show you text of that also. I'am not even sure Iván Kerr is alive she moved out about two months ago (Thank God) she needs help real bad, Best Regards Neeru Alexis.

## 2023-12-01 DIAGNOSIS — M54.12 CERVICAL RADICULOPATHY: ICD-10-CM

## 2023-12-01 RX ORDER — IBUPROFEN 800 MG/1
800 TABLET ORAL 2 TIMES DAILY PRN
Qty: 60 TABLET | Refills: 5 | Status: SHIPPED | OUTPATIENT
Start: 2023-12-01

## 2023-12-01 NOTE — TELEPHONE ENCOUNTER
Requested Prescriptions     Pending Prescriptions Disp Refills    ibuprofen (ADVIL;MOTRIN) 800 MG tablet 60 tablet 5     Sig: Take 1 tablet by mouth 2 times daily as needed for Pain

## 2023-12-07 ENCOUNTER — TELEPHONE (OUTPATIENT)
Dept: ONCOLOGY | Age: 63
End: 2023-12-07

## 2023-12-15 ENCOUNTER — HOSPITAL ENCOUNTER (OUTPATIENT)
Dept: CT IMAGING | Age: 63
End: 2023-12-15
Attending: INTERNAL MEDICINE
Payer: COMMERCIAL

## 2023-12-15 DIAGNOSIS — Z87.891 PERSONAL HISTORY OF TOBACCO USE: ICD-10-CM

## 2023-12-15 PROCEDURE — 71271 CT THORAX LUNG CANCER SCR C-: CPT

## 2023-12-19 PROBLEM — I25.10 CORONARY ARTERY CALCIFICATION SEEN ON CT SCAN: Status: ACTIVE | Noted: 2023-12-19

## 2023-12-29 ENCOUNTER — TELEPHONE (OUTPATIENT)
Dept: GASTROENTEROLOGY | Age: 63
End: 2023-12-29

## 2024-01-10 DIAGNOSIS — E55.9 VITAMIN D DEFICIENCY: ICD-10-CM

## 2024-01-10 DIAGNOSIS — N40.0 BENIGN PROSTATIC HYPERPLASIA, UNSPECIFIED WHETHER LOWER URINARY TRACT SYMPTOMS PRESENT: ICD-10-CM

## 2024-01-10 RX ORDER — ERGOCALCIFEROL 1.25 MG/1
CAPSULE ORAL
Qty: 12 CAPSULE | Refills: 1 | Status: SHIPPED | OUTPATIENT
Start: 2024-01-10

## 2024-01-10 RX ORDER — TADALAFIL 20 MG/1
20 TABLET ORAL DAILY PRN
Qty: 30 TABLET | Refills: 1 | Status: SHIPPED | OUTPATIENT
Start: 2024-01-10

## 2024-05-14 DIAGNOSIS — F43.22 ADJUSTMENT DISORDER WITH ANXIOUS MOOD: ICD-10-CM

## 2024-05-14 RX ORDER — TRAZODONE HYDROCHLORIDE 50 MG/1
50 TABLET ORAL NIGHTLY
Qty: 90 TABLET | Refills: 1 | Status: SHIPPED | OUTPATIENT
Start: 2024-05-14

## 2024-05-14 NOTE — TELEPHONE ENCOUNTER
Eduardo Pride is calling to request a refill on the following medication(s):    Medication Request:  Requested Prescriptions     Pending Prescriptions Disp Refills    traZODone (DESYREL) 50 MG tablet [Pharmacy Med Name: TRAZODONE 50 MG TABLET] 90 tablet 1     Sig: TAKE ONE TABLET BY MOUTH ONCE NIGHTLY       Last Visit Date (If Applicable):  12/19/2023    Next Visit Date:    6/19/2024

## 2024-05-29 DIAGNOSIS — E78.00 PURE HYPERCHOLESTEROLEMIA: ICD-10-CM

## 2024-05-29 RX ORDER — ATORVASTATIN CALCIUM 20 MG/1
TABLET, FILM COATED ORAL
Qty: 90 TABLET | Refills: 1 | Status: SHIPPED | OUTPATIENT
Start: 2024-05-29

## 2024-05-29 NOTE — TELEPHONE ENCOUNTER
Eduardo Pride is calling to request a refill on the following medication(s):    Medication Request:  Requested Prescriptions     Pending Prescriptions Disp Refills    atorvastatin (LIPITOR) 20 MG tablet [Pharmacy Med Name: ATORVASTATIN 20 MG TABLET] 60 tablet      Sig: TAKE 1 TABLET BY MOUTH DAILY       Last Visit Date (If Applicable):  12/19/2023    Next Visit Date:    6/19/2024

## 2024-08-16 ENCOUNTER — HOSPITAL ENCOUNTER (EMERGENCY)
Age: 64
Discharge: HOME | End: 2024-08-16
Attending: EMERGENCY MEDICINE
Payer: COMMERCIAL

## 2024-08-16 VITALS
SYSTOLIC BLOOD PRESSURE: 117 MMHG | DIASTOLIC BLOOD PRESSURE: 76 MMHG | WEIGHT: 170 LBS | OXYGEN SATURATION: 97 % | RESPIRATION RATE: 16 BRPM | TEMPERATURE: 98.6 F | HEART RATE: 89 BPM | BODY MASS INDEX: 25.1 KG/M2

## 2024-08-16 DIAGNOSIS — Z76.0 ENCOUNTER FOR MEDICATION REFILL: Primary | ICD-10-CM

## 2024-08-16 PROCEDURE — 99283 EMERGENCY DEPT VISIT LOW MDM: CPT

## 2024-08-16 RX ORDER — TAMSULOSIN HYDROCHLORIDE 0.4 MG/1
0.4 CAPSULE ORAL DAILY
Qty: 30 CAPSULE | Refills: 0 | Status: SHIPPED | OUTPATIENT
Start: 2024-08-16

## 2024-08-16 ASSESSMENT — ENCOUNTER SYMPTOMS
VOMITING: 0
ABDOMINAL PAIN: 0
NAUSEA: 0

## 2024-08-16 NOTE — DISCHARGE INSTRUCTIONS
Please follow-up with urology as planned.     PLEASE RETURN TO THE EMERGENCY DEPARTMENT IMMEDIATELY if your symptoms worsen in anyway or in 8-12 hours if not improved for re-evaluation.  You should immediately return to the ER for symptoms such as increasing pain, bloody stool, fever, a feeling of passing out, light headed, dizziness, chest pain, shortness of breath, persistent nausea and/or vomiting, numbness or weakness to the arms or legs, coolness or color change of the arms or legs.      Take your medication as indicated and prescribed.  If you are given an antibiotic then, make sure you get the prescription filled and take the antibiotics until finished.      THANK YOU!!!    From [facility] and [facility]    On behalf of the Emergency Department staff at [facility], I would like to thank you for giving us the opportunity to address your health care needs and concerns.    We hope that during your visit, our service was delivered in a professional and caring manner. Please keep [facility] in mind as we walk with you down the path to your own personal wellness.     Please expect an automated text message or email from us so we can ask a few questions about your health and progress. Based on your answers, a clinician may call you back to offer help and instructions.    Please understand that early in the process of an illness or injury, an emergency department workup can be falsely reassuring.  If you notice any worsening, changing or persistent symptoms please call your family doctor or return to the ER immediately.     Tell us how we did during your visit at [email or web address]."[email or web address][email or web address]   and let us know about your experience

## 2024-08-16 NOTE — ED PROVIDER NOTES
Resp: 16   Temp: 98.6 °F (37 °C)   TempSrc: Oral   SpO2: 97%   Weight: 77.1 kg (170 lb)     -------------------------  BP: 117/76, Temp: 98.6 °F (37 °C), Pulse: 89, Respirations: 16      RE-EVALUATION:  See ED Course notes above.    DISCHARGE PLANNING:    The patient and/or family and I have discussed the diagnosis and risks, and we agree with discharging home to follow-up with their pertinent providers. The patient appears stable for discharge and has been instructed to return immediately for new concerning symptoms or if the symptoms worsen in any way. The patient understands that at this time there is no evidence for a more malignant underlying process, but the patient also understands that early in the process of an illness or injury, an emergency department workup can be falsely reassuring. Routine discharge counseling was given, and the patient understands that worsening, changing or persistent symptoms should prompt an immediate call or follow up with their primary physician or return to the emergency department.    I have reviewed the disposition diagnosis with the patient and or their family/guardian. I have answered their questions and given discharge instructions. They voiced understanding of these instructions and did not have any further questions or complaints.       CONSULTS:  None    PROCEDURES:  None    FINAL IMPRESSION      1. Encounter for medication refill          DISPOSITION / PLAN     CONDITION ON DISPOSITION:   Good / Stable for discharge.     PATIENT REFERRED TO:  Kurtis Duffy MD  [address]  [place] [address]    Schedule an appointment as soon as possible for a visit       [facility] Emergency Department  [address]  [place] [address]  [phone]  Go to   New or worsening symptoms    Edel Norwood MD  [address]  [address]  [place] [address]  [phone]            DISCHARGE MEDICATIONS:  Discharge Medication List as of 8/16/2024   3:27 PM          SARAH Garcia - CNP   Emergency Medicine Nurse Practitioner    (Please note that portions of this note were completed with a voice recognition program.  Efforts were made to edit the dictations but occasionally words aremis-transcribed.)       Laurie Valenzuela APRN - CNP  08/16/24 [id number]

## 2024-08-16 NOTE — ED PROVIDER NOTES
[facility] EMERGENCY DEPARTMENT  eMERGENCY dEPARTMENT eNCOUnter   Independent Attestation     Pt Name: Eduardo Pride  MRN: [id number]  Birthdate 1960  Date of evaluation: 8/16/24       Eduardo Pride is a 64 y.o. male who presents with Urinary Retention (Pcp wont refill pts flomax)        Based on the medical record, the care appears appropriate. I was personally available for consultation in the Emergency Department.    Oneil Camacho DO  Attending Emergency  Physician               To, Oneil MUSE DO  08/16/24 [id number]

## 2024-09-16 ASSESSMENT — PATIENT HEALTH QUESTIONNAIRE - PHQ9
SUM OF ALL RESPONSES TO PHQ9 QUESTIONS 1 & 2: 0
SUM OF ALL RESPONSES TO PHQ QUESTIONS 1-9: 0
2. FEELING DOWN, DEPRESSED OR HOPELESS: NOT AT ALL
1. LITTLE INTEREST OR PLEASURE IN DOING THINGS: NOT AT ALL
2. FEELING DOWN, DEPRESSED OR HOPELESS: NOT AT ALL
1. LITTLE INTEREST OR PLEASURE IN DOING THINGS: NOT AT ALL
SUM OF ALL RESPONSES TO PHQ QUESTIONS 1-9: 0
SUM OF ALL RESPONSES TO PHQ9 QUESTIONS 1 & 2: 0

## 2024-09-17 ENCOUNTER — OFFICE VISIT (OUTPATIENT)
Dept: FAMILY MEDICINE CLINIC | Age: 64
End: 2024-09-17
Payer: COMMERCIAL

## 2024-09-17 VITALS
DIASTOLIC BLOOD PRESSURE: 62 MMHG | OXYGEN SATURATION: 97 % | HEART RATE: 93 BPM | WEIGHT: 169 LBS | TEMPERATURE: 97 F | BODY MASS INDEX: 25.03 KG/M2 | HEIGHT: 69 IN | SYSTOLIC BLOOD PRESSURE: 120 MMHG

## 2024-09-17 DIAGNOSIS — Z72.0 TOBACCO ABUSE: ICD-10-CM

## 2024-09-17 DIAGNOSIS — Z12.5 SCREENING FOR MALIGNANT NEOPLASM OF PROSTATE: ICD-10-CM

## 2024-09-17 DIAGNOSIS — N40.0 BENIGN PROSTATIC HYPERPLASIA, UNSPECIFIED WHETHER LOWER URINARY TRACT SYMPTOMS PRESENT: ICD-10-CM

## 2024-09-17 DIAGNOSIS — F43.22 ADJUSTMENT DISORDER WITH ANXIOUS MOOD: ICD-10-CM

## 2024-09-17 DIAGNOSIS — Z12.11 COLON CANCER SCREENING: ICD-10-CM

## 2024-09-17 DIAGNOSIS — J44.9 STAGE 1 MILD COPD BY GOLD CLASSIFICATION (HCC): Primary | ICD-10-CM

## 2024-09-17 DIAGNOSIS — B18.2 CHRONIC HEPATITIS C WITHOUT HEPATIC COMA (HCC): ICD-10-CM

## 2024-09-17 DIAGNOSIS — E78.00 PURE HYPERCHOLESTEROLEMIA: ICD-10-CM

## 2024-09-17 DIAGNOSIS — R73.03 PREDIABETES: ICD-10-CM

## 2024-09-17 DIAGNOSIS — E55.9 VITAMIN D DEFICIENCY: ICD-10-CM

## 2024-09-17 PROCEDURE — 3017F COLORECTAL CA SCREEN DOC REV: CPT | Performed by: STUDENT IN AN ORGANIZED HEALTH CARE EDUCATION/TRAINING PROGRAM

## 2024-09-17 PROCEDURE — 3023F SPIROM DOC REV: CPT | Performed by: STUDENT IN AN ORGANIZED HEALTH CARE EDUCATION/TRAINING PROGRAM

## 2024-09-17 PROCEDURE — G8420 CALC BMI NORM PARAMETERS: HCPCS | Performed by: STUDENT IN AN ORGANIZED HEALTH CARE EDUCATION/TRAINING PROGRAM

## 2024-09-17 PROCEDURE — G8427 DOCREV CUR MEDS BY ELIG CLIN: HCPCS | Performed by: STUDENT IN AN ORGANIZED HEALTH CARE EDUCATION/TRAINING PROGRAM

## 2024-09-17 PROCEDURE — 4004F PT TOBACCO SCREEN RCVD TLK: CPT | Performed by: STUDENT IN AN ORGANIZED HEALTH CARE EDUCATION/TRAINING PROGRAM

## 2024-09-17 PROCEDURE — 99214 OFFICE O/P EST MOD 30 MIN: CPT | Performed by: STUDENT IN AN ORGANIZED HEALTH CARE EDUCATION/TRAINING PROGRAM

## 2024-09-17 RX ORDER — TRAZODONE HYDROCHLORIDE 50 MG/1
50 TABLET, FILM COATED ORAL NIGHTLY
Qty: 90 TABLET | Refills: 1 | Status: SHIPPED | OUTPATIENT
Start: 2024-09-17

## 2024-09-17 RX ORDER — TAMSULOSIN HYDROCHLORIDE 0.4 MG/1
0.4 CAPSULE ORAL DAILY
Qty: 90 CAPSULE | Refills: 1 | Status: SHIPPED | OUTPATIENT
Start: 2024-09-17

## 2024-09-17 RX ORDER — ATORVASTATIN CALCIUM 20 MG/1
TABLET, FILM COATED ORAL
Qty: 90 TABLET | Refills: 1 | Status: SHIPPED | OUTPATIENT
Start: 2024-09-17

## 2024-09-17 SDOH — ECONOMIC STABILITY: FOOD INSECURITY: WITHIN THE PAST 12 MONTHS, YOU WORRIED THAT YOUR FOOD WOULD RUN OUT BEFORE YOU GOT MONEY TO BUY MORE.: NEVER TRUE

## 2024-09-17 SDOH — ECONOMIC STABILITY: INCOME INSECURITY: IN THE LAST 12 MONTHS, WAS THERE A TIME WHEN YOU WERE NOT ABLE TO PAY THE MORTGAGE OR RENT ON TIME?: NO

## 2024-09-17 SDOH — ECONOMIC STABILITY: FOOD INSECURITY: WITHIN THE PAST 12 MONTHS, THE FOOD YOU BOUGHT JUST DIDN'T LAST AND YOU DIDN'T HAVE MONEY TO GET MORE.: NEVER TRUE

## 2024-09-17 ASSESSMENT — SOCIAL DETERMINANTS OF HEALTH (SDOH): HOW HARD IS IT FOR YOU TO PAY FOR THE VERY BASICS LIKE FOOD, HOUSING, MEDICAL CARE, AND HEATING?: NOT HARD AT ALL

## 2024-09-18 ASSESSMENT — ENCOUNTER SYMPTOMS
EYE DISCHARGE: 0
SORE THROAT: 0
DIARRHEA: 0
NAUSEA: 0
COUGH: 0
CONSTIPATION: 0
SHORTNESS OF BREATH: 0
VOMITING: 0
WHEEZING: 0
CHEST TIGHTNESS: 0

## 2024-09-20 ENCOUNTER — HOSPITAL ENCOUNTER (OUTPATIENT)
Age: 64
Setting detail: SPECIMEN
Discharge: HOME | End: 2024-09-20

## 2024-09-20 DIAGNOSIS — E55.9 VITAMIN D DEFICIENCY: ICD-10-CM

## 2024-09-20 DIAGNOSIS — E78.00 PURE HYPERCHOLESTEROLEMIA: ICD-10-CM

## 2024-09-20 DIAGNOSIS — Z12.5 SCREENING FOR MALIGNANT NEOPLASM OF PROSTATE: ICD-10-CM

## 2024-09-20 DIAGNOSIS — R73.03 PREDIABETES: ICD-10-CM

## 2024-09-20 DIAGNOSIS — J44.9 STAGE 1 MILD COPD BY GOLD CLASSIFICATION (HCC): ICD-10-CM

## 2024-09-20 LAB
25(OH)D3 SERPL-MCNC: 29.3 NG/ML (ref 30–100)
ALBUMIN SERPL-MCNC: 4.5 G/DL (ref 3.5–5.2)
ALBUMIN/GLOB SERPL: 2 {RATIO} (ref 1–2.5)
ALP SERPL-CCNC: 53 U/L (ref 40–129)
ALT SERPL-CCNC: 16 U/L (ref 10–50)
ANION GAP SERPL CALCULATED.3IONS-SCNC: 11 MMOL/L (ref 9–16)
AST SERPL-CCNC: 19 U/L (ref 10–50)
BASOPHILS # BLD: 0.09 K/UL (ref 0–0.2)
BASOPHILS NFR BLD: 1 % (ref 0–2)
BILIRUB SERPL-MCNC: 0.4 MG/DL (ref 0–1.2)
BUN SERPL-MCNC: 15 MG/DL (ref 8–23)
CALCIUM SERPL-MCNC: 9.4 MG/DL (ref 8.6–10.4)
CHLORIDE SERPL-SCNC: 106 MMOL/L (ref 98–107)
CHOLEST SERPL-MCNC: 168 MG/DL (ref 0–199)
CHOLESTEROL/HDL RATIO: 3
CO2 SERPL-SCNC: 23 MMOL/L (ref 20–31)
CREAT SERPL-MCNC: 0.9 MG/DL (ref 0.7–1.2)
EOSINOPHIL # BLD: 0.48 K/UL (ref 0–0.44)
EOSINOPHILS RELATIVE PERCENT: 5 % (ref 1–4)
ERYTHROCYTE [DISTWIDTH] IN BLOOD BY AUTOMATED COUNT: 16 % (ref 11.8–14.4)
EST. AVERAGE GLUCOSE BLD GHB EST-MCNC: 120 MG/DL
GFR, ESTIMATED: >90 ML/MIN/1.73M2
GLUCOSE P FAST SERPL-MCNC: 89 MG/DL (ref 74–99)
HBA1C MFR BLD: 5.8 % (ref 4–6)
HCT VFR BLD AUTO: 49.4 % (ref 40.7–50.3)
HDLC SERPL-MCNC: 60 MG/DL
HGB BLD-MCNC: 15.9 G/DL (ref 13–17)
IMM GRANULOCYTES # BLD AUTO: 0.03 K/UL (ref 0–0.3)
IMM GRANULOCYTES NFR BLD: 0 %
LDLC SERPL CALC-MCNC: 93 MG/DL (ref 0–100)
LYMPHOCYTES NFR BLD: 2.53 K/UL (ref 1.1–3.7)
LYMPHOCYTES RELATIVE PERCENT: 26 % (ref 24–43)
MCH RBC QN AUTO: 29.6 PG (ref 25.2–33.5)
MCHC RBC AUTO-ENTMCNC: 32.2 G/DL (ref 28.4–34.8)
MCV RBC AUTO: 91.8 FL (ref 82.6–102.9)
MONOCYTES NFR BLD: 0.95 K/UL (ref 0.1–1.2)
MONOCYTES NFR BLD: 10 % (ref 3–12)
NEUTROPHILS NFR BLD: 58 % (ref 36–65)
NEUTS SEG NFR BLD: 5.61 K/UL (ref 1.5–8.1)
NRBC BLD-RTO: 0 PER 100 WBC
PLATELET # BLD AUTO: 359 K/UL (ref 138–453)
PMV BLD AUTO: 10.5 FL (ref 8.1–13.5)
POTASSIUM SERPL-SCNC: 4.4 MMOL/L (ref 3.7–5.3)
PROT SERPL-MCNC: 6.9 G/DL (ref 6.6–8.7)
PSA SERPL-MCNC: 3.7 NG/ML (ref 0–4)
RBC # BLD AUTO: 5.38 M/UL (ref 4.21–5.77)
RBC # BLD: ABNORMAL 10*6/UL
SODIUM SERPL-SCNC: 140 MMOL/L (ref 136–145)
TRIGL SERPL-MCNC: 74 MG/DL (ref 0–149)
TSH SERPL DL<=0.05 MIU/L-ACNC: 1.58 UIU/ML (ref 0.27–4.2)
VLDLC SERPL CALC-MCNC: 15 MG/DL
WBC OTHER # BLD: 9.7 K/UL (ref 3.5–11.3)

## 2024-10-31 DIAGNOSIS — J43.8 OTHER EMPHYSEMA (HCC): ICD-10-CM

## 2024-10-31 DIAGNOSIS — J44.9 STAGE 1 MILD COPD BY GOLD CLASSIFICATION (HCC): ICD-10-CM

## 2024-10-31 RX ORDER — FLUTICASONE PROPIONATE AND SALMETEROL 500; 50 UG/1; UG/1
1 POWDER RESPIRATORY (INHALATION) 2 TIMES DAILY
Qty: 1 EACH | Refills: 2 | Status: SHIPPED | OUTPATIENT
Start: 2024-10-31 | End: 2025-01-29

## 2024-10-31 RX ORDER — ALBUTEROL SULFATE 90 UG/1
AEROSOL, METERED RESPIRATORY (INHALATION)
Qty: 54 G | Refills: 1 | Status: SHIPPED | OUTPATIENT
Start: 2024-10-31

## 2024-10-31 NOTE — TELEPHONE ENCOUNTER
Eduardo Pride is calling to request a refill on the following medication(s):    Medication Request:  Requested Prescriptions     Pending Prescriptions Disp Refills    VENTOLIN  (90 Base) MCG/ACT inhaler [Pharmacy Med Name: VENTOLIN HFA 90 MCG INHALER] 54 g      Sig: INHALE 2 PUFFS BY MOUTH FOUR TIMES A DAY AS NEEDED FOR WHEEZING       Last Visit Date (If Applicable):  9/17/2024    Next Visit Date:    Visit date not found

## 2024-10-31 NOTE — TELEPHONE ENCOUNTER
Last visit: 4/19/23  Next visit: no follow up scheduled. F/u in 8/2023 was cancelled due to insurance, appt 12/2023 was cancelled due to needing CT. No appt r/s.    Refill request for Advair. Per last dictation, patient using. Please see pended script and advise.

## 2025-02-19 DIAGNOSIS — E55.9 VITAMIN D DEFICIENCY: ICD-10-CM

## 2025-02-19 NOTE — TELEPHONE ENCOUNTER
Eduardo Pride is calling to request a refill on the following medication(s):    Medication Request:  Requested Prescriptions     Pending Prescriptions Disp Refills    vitamin D (ERGOCALCIFEROL) 1.25 MG (88892 UT) CAPS capsule [Pharmacy Med Name: VIT D2 (ERGOCAL) 1.25MG(50,000U) CP] 12 capsule 1     Sig: TAKE 1 CAPSULE BY MOUTH ONCE WEEKLY       Last Visit Date (If Applicable):  9/17/2024    Next Visit Date:    Visit date not found

## 2025-02-21 RX ORDER — ERGOCALCIFEROL 1.25 MG/1
CAPSULE, LIQUID FILLED ORAL
Qty: 12 CAPSULE | Refills: 1 | Status: SHIPPED | OUTPATIENT
Start: 2025-02-21

## 2025-02-27 DIAGNOSIS — F43.22 ADJUSTMENT DISORDER WITH ANXIOUS MOOD: ICD-10-CM

## 2025-02-28 RX ORDER — TRAZODONE HYDROCHLORIDE 50 MG/1
50 TABLET ORAL NIGHTLY
Qty: 90 TABLET | Refills: 1 | Status: SHIPPED | OUTPATIENT
Start: 2025-02-28

## 2025-02-28 NOTE — TELEPHONE ENCOUNTER
Eduardo Pride is calling to request a refill on the following medication(s):    Medication Request:  Requested Prescriptions     Pending Prescriptions Disp Refills    traZODone (DESYREL) 50 MG tablet [Pharmacy Med Name: traZODone 50 MG TABLET] 90 tablet 1     Sig: TAKE ONE TABLET BY MOUTH ONCE NIGHTLY       Last Visit Date (If Applicable):  9/17/2024    Next Visit Date:    Visit date not found

## 2025-04-04 DIAGNOSIS — N40.0 BENIGN PROSTATIC HYPERPLASIA, UNSPECIFIED WHETHER LOWER URINARY TRACT SYMPTOMS PRESENT: ICD-10-CM

## 2025-04-04 NOTE — TELEPHONE ENCOUNTER
Eduardo Pride is calling to request a refill on the following medication(s):    Medication Request:  Requested Prescriptions     Pending Prescriptions Disp Refills    tamsulosin (FLOMAX) 0.4 MG capsule [Pharmacy Med Name: TAMSULOSIN HCL 0.4 MG CAPSULE] 90 capsule 1     Sig: TAKE 1 CAPSULE BY MOUTH DAILY       Last Visit Date (If Applicable):  9/17/2024    Next Visit Date:    Visit date not found

## 2025-04-07 RX ORDER — TAMSULOSIN HYDROCHLORIDE 0.4 MG/1
0.4 CAPSULE ORAL DAILY
Qty: 90 CAPSULE | Refills: 1 | Status: SHIPPED | OUTPATIENT
Start: 2025-04-07

## 2025-06-26 ENCOUNTER — TELEPHONE (OUTPATIENT)
Dept: GASTROENTEROLOGY | Age: 65
End: 2025-06-26

## (undated) DEVICE — BLADELESS OBTURATOR: Brand: WECK VISTA

## (undated) DEVICE — APPLICATOR MEDICATED 26 CC SOLUTION HI LT ORNG CHLORAPREP

## (undated) DEVICE — PLUMEPORT LAPAROSCOPIC SMOKE FILTRATION DEVICE: Brand: PLUMEPORT ACTIV

## (undated) DEVICE — ADHESIVE SKIN CLOSURE TOP 36 CC HI VISC DERMBND MINI

## (undated) DEVICE — SUTURE V-LOC 180 SZ 3-0 L6IN ABSRB GRN V-20 L26MM 1/2 CIR VLOCL0604

## (undated) DEVICE — SOLUTION IV IRRIG POUR BRL 0.9% SODIUM CHL 2F7124

## (undated) DEVICE — INSUFFLATION NEEDLE TO ESTABLISH PNEUMOPERITONEUM.: Brand: INSUFFLATION NEEDLE

## (undated) DEVICE — BLADE CLIPPER GEN PURP NS

## (undated) DEVICE — PROTECTOR ULN NRV PUR FOAM HK LOOP STRP ANATOMICALLY

## (undated) DEVICE — STRAP,POSITIONING,KNEE/BODY,FOAM,4X60": Brand: MEDLINE

## (undated) DEVICE — CANNULA SEAL

## (undated) DEVICE — CLIPVAC PRESURG HAIR REMOVAL

## (undated) DEVICE — ARM DRAPE

## (undated) DEVICE — SOLUTION ANTIFOG VIS SYS CLEARIFY LAPSCP

## (undated) DEVICE — MHPB BASIC LAP PACK: Brand: MEDLINE INDUSTRIES, INC.

## (undated) DEVICE — PAD PT POS 36 IN SURGYPAD DISP

## (undated) DEVICE — SUTURE MCRYL + SZ 4-0 L27IN ABSRB UD L19MM PS-2 3/8 CIR MCP426H

## (undated) DEVICE — BLANKET WRM W29.9XL79.1IN UP BODY FORC AIR MISTRAL-AIR

## (undated) DEVICE — TIP COVER ACCESSORY

## (undated) DEVICE — GLOVE ORANGE PI 7   MSG9070